# Patient Record
Sex: FEMALE | Race: OTHER | Employment: UNEMPLOYED | URBAN - METROPOLITAN AREA
[De-identification: names, ages, dates, MRNs, and addresses within clinical notes are randomized per-mention and may not be internally consistent; named-entity substitution may affect disease eponyms.]

---

## 2017-01-23 ENCOUNTER — OFFICE VISIT (OUTPATIENT)
Dept: INTERNAL MEDICINE CLINIC | Age: 32
End: 2017-01-23

## 2017-01-23 VITALS
TEMPERATURE: 98.3 F | HEIGHT: 66 IN | OXYGEN SATURATION: 98 % | RESPIRATION RATE: 16 BRPM | DIASTOLIC BLOOD PRESSURE: 80 MMHG | HEART RATE: 82 BPM | WEIGHT: 249 LBS | BODY MASS INDEX: 40.02 KG/M2 | SYSTOLIC BLOOD PRESSURE: 114 MMHG

## 2017-01-23 DIAGNOSIS — K21.9 GASTROESOPHAGEAL REFLUX DISEASE, ESOPHAGITIS PRESENCE NOT SPECIFIED: Primary | ICD-10-CM

## 2017-01-23 NOTE — PROGRESS NOTES
.it   Cory Giordano is a 32 y.o. female and presents with Heartburn (stomach pain   chronic    tried OTC meds nothing works  )  . GERD  Pt reports she is having heart burn symptoms. No weight loss no blood in her stool. She has been gaining weight. She has tried Tums with no relief. She has not tried omeprazole otc. CT abdomen and pelvis 7/2016 overall normal.  She went to [facility] in December due to SI. She tried to take full bottle of pills as Suicide attempt. She had gi work up there and reportedly negative. I do not have these records at this visit. She reports she had an abdominal ultrasound which was normal.  She notes she has a taste in her mouth at times which is like acid    Pseudotumor cerebri  She has not seen neurology yet    Depression  Taking medications and seeing psychiatry        Review of Systems  Constitutional: negative for fevers, chills, anorexia and weight loss  Eyes:   negative for visual disturbance and irritation  ENT:   negative for tinnitus,sore throat,nasal congestion,ear pains. hoarseness  Respiratory:  negative for cough, hemoptysis, dyspnea,wheezing  CV:   negative for chest pain, palpitations, lower extremity edema  GI:   negative for nausea, vomiting, diarrhea, abdominal pain,melena  Endo:               negative for polyuria,polydipsia,polyphagia,heat intolerance  Genitourinary: negative for frequency, dysuria and hematuria  Integument:  negative for rash and pruritus  Hematologic:  negative for easy bruising and gum/nose bleeding  Musculoskel: negative for myalgias, arthralgias, back pain, muscle weakness, joint pain  Neurological:  negative for headaches, dizziness, vertigo, memory problems and gait   Behavl/Psych: negative for feelings of anxiety, depression, mood changes    Past Medical History   Diagnosis Date    Calculus of kidney     H/O headache 7/8/2013    Headache(784.0)     Kidney disease     Pseudotumor cerebri syndrome 7/8/2013     Past Surgical History   Procedure Laterality Date    Emg limited  2/12/2016     Social History     Social History    Marital status:      Spouse name: N/A    Number of children: N/A    Years of education: N/A     Social History Main Topics    Smoking status: Never Smoker    Smokeless tobacco: Never Used    Alcohol use No    Drug use: No    Sexual activity: Yes     Partners: Male     Birth control/ protection: IUD     Other Topics Concern    None     Social History Narrative    Home with children 3 healthy     Family History   Problem Relation Age of Onset    Diabetes Mother     Hypertension Mother     Diabetes Father     Hypertension Father     Cancer Neg Hx      Current Outpatient Prescriptions   Medication Sig Dispense Refill    lurasidone (LATUDA) 80 mg tab tablet Take 80 mg by mouth. Indications: half tablet daily   0.5      traMADol (ULTRAM) 50 mg tablet Take 1 Tab by mouth every six (6) hours as needed for Pain. Max Daily Amount: 200 mg. 30 Tab 0    topiramate (TOPAMAX) 50 mg tablet Take 1 tab twice a day for migraine prevention 60 Tab 2    OTHER Rx: Neutral position wrist splint, left hand. Dx: Carpal Tunnel Syndrome G56.00    Directions: wear overnight. Dispense #1. No RF 1 Units 0    prenatal multivit-ca-min-fe-fa Tab Take  by mouth.          No Known Allergies    Objective:  Visit Vitals    /80 (BP 1 Location: Left arm, BP Patient Position: Sitting)    Pulse 82    Temp 98.3 °F (36.8 °C)    Resp 16    Ht 5' 6\" (1.676 m)    Wt 249 lb (112.9 kg)    SpO2 98%    BMI 40.19 kg/m2     Physical Exam:   General appearance - alert, well appearing, and in no distress, overweight and voices frustration re: home situation  Mental status - alert, oriented to person, place, and time  EYE-BENEDICT, EOMI, corneas normal, no foreign bodies, visual acuity normal both eyes, no periorbital cellulitis  ENT-ENT exam normal, no neck nodes or sinus tenderness  Nose - normal and patent, no erythema, discharge or polyps  Mouth - mucous membranes moist, pharynx normal without lesions  Neck - supple, no significant adenopathy   Chest - clear to auscultation, no wheezes, rales or rhonchi, symmetric air entry   Heart - normal rate, regular rhythm, normal S1, S2, no murmurs, rubs, clicks or gallops   Abdomen - soft, nontender, nondistended, no masses or organomegaly, obese, striae  Lymph- no adenopathy palpable  Ext-peripheral pulses normal, no pedal edema, no clubbing or cyanosis  Skin-Warm and dry. no hyperpigmentation, vitiligo, or suspicious lesions, no blisters or rash in area  Neuro -alert, oriented, normal speech, no focal findings or movement disorder noted  Neck-full range of motion      Results for orders placed or performed during the hospital encounter of 07/25/16   CULTURE, URINE   Result Value Ref Range    Special Requests: NO SPECIAL REQUESTS      Bath Count [id number]  COLONIES/mL        Culture result: MIXED UROGENITAL ANGELICA ISOLATED     CBC WITH AUTOMATED DIFF   Result Value Ref Range    WBC 5.6 3.6 - 11.0 K/uL    RBC 4.68 3.80 - 5.20 M/uL    HGB 13.4 11.5 - 16.0 g/dL    HCT 39.7 35.0 - 47.0 %    MCV 84.8 80.0 - 99.0 FL    MCH 28.6 26.0 - 34.0 PG    MCHC 33.8 30.0 - 36.5 g/dL    RDW 11.8 11.5 - 14.5 %    PLATELET 021 571 - 321 K/uL    NEUTROPHILS 57 32 - 75 %    LYMPHOCYTES 32 12 - 49 %    MONOCYTES 9 5 - 13 %    EOSINOPHILS 2 0 - 7 %    BASOPHILS 0 0 - 1 %    ABS. NEUTROPHILS 3.2 1.8 - 8.0 K/UL    ABS. LYMPHOCYTES 1.8 0.8 - 3.5 K/UL    ABS. MONOCYTES 0.5 0.0 - 1.0 K/UL    ABS. EOSINOPHILS 0.1 0.0 - 0.4 K/UL    ABS.  BASOPHILS 0.0 0.0 - 0.1 K/UL    DF AUTOMATED     METABOLIC PANEL, COMPREHENSIVE   Result Value Ref Range    Sodium 139 136 - 145 mmol/L    Potassium 3.6 3.5 - 5.1 mmol/L    Chloride 103 97 - 108 mmol/L    CO2 26 21 - 32 mmol/L    Anion gap 10 5 - 15 mmol/L    Glucose 101 (H) 65 - 100 mg/dL    BUN 14 6 - 20 MG/DL    Creatinine 0.63 0.55 - 1.02 MG/DL    BUN/Creatinine ratio 22 (H) 12 - 20 GFR est AA >60 >60 ml/min/1.73m2    GFR est non-AA >60 >60 ml/min/1.73m2    Calcium 9.5 8.5 - 10.1 MG/DL    Bilirubin, total 1.8 (H) 0.2 - 1.0 MG/DL    ALT 21 12 - 78 U/L    AST 13 (L) 15 - 37 U/L    Alk. phosphatase 50 45 - 117 U/L    Protein, total 7.8 6.4 - 8.2 g/dL    Albumin 3.7 3.5 - 5.0 g/dL    Globulin 4.1 (H) 2.0 - 4.0 g/dL    A-G Ratio 0.9 (L) 1.1 - 2.2     LIPASE   Result Value Ref Range    Lipase 201 73 - 393 U/L   SED RATE (ESR)   Result Value Ref Range    Sed rate, automated 30 (H) 0 - 20 mm/hr   URINALYSIS W/ REFLEX CULTURE   Result Value Ref Range    Color YELLOW      Appearance HAZY (A) CLEAR      Specific gravity 1.020 1.003 - 1.030      pH (UA) 6.0 5.0 - 8.0      Protein NEGATIVE  NEG mg/dL    Glucose NEGATIVE  NEG mg/dL    Ketone NEGATIVE  NEG mg/dL    Bilirubin NEGATIVE  NEG      Blood TRACE (A) NEG      Urobilinogen 0.2 0.2 - 1.0 EU/dL    Nitrites NEGATIVE  NEG      Leukocyte Esterase TRACE (A) NEG      WBC 5-10 0 - 4 /hpf    RBC 0-5 0 - 5 /hpf    Epithelial cells MANY (A) FEW /lpf    Bacteria 1+ (A) NEG /hpf    UA:UC IF INDICATED URINE CULTURE ORDERED (A) CNI     HCG URINE, QL. - POC   Result Value Ref Range    Pregnancy test,urine (POC) NEGATIVE  NEG       Fadia was seen today for heartburn. Diagnoses and all orders for this visit:    Gastroesophageal reflux disease, esophagitis presence not specified  Benign exam, no red flags: weight loss, pain at night, blood in stool  Will try otc prilosec   Follow up in 2 months ini    Pseudotumor cerebri  Needs follow up with neurology. She is aware   No headache or sx on this visit    Depression  Follow up with psychiatry      I spent 15 min with pt and >50% of the time was spent in management and counseling gerd and diagnositc trial of PPI. disussion with pt re: compliance with specialists. jbk    This note will not be viewable in [address]

## 2017-01-23 NOTE — PROGRESS NOTES
Have you been to the ER or urgent care clinic since your last visit? ER  [place]  Stomach  X 2 months ago         Have you been hospitalized since your last visit? [facility]   Overdose   [place]   12/28/16   X 8 days     Have you been seen or consulted any other health care provider outside of [address] [place] [address] since your last visit (including pap smears, colonoscopy screening)? MRI   Blood work  EKG   All at [facility]

## 2017-01-23 NOTE — PATIENT INSTRUCTIONS

## 2017-01-23 NOTE — MR AVS SNAPSHOT
Visit Information Date & Time Provider Department Dept. Phone Encounter #  
 1/23/2017  9:45 AM Geeta Hair MD [facility] [address] [id number] Upcoming Health Maintenance Date Due DTaP/Tdap/Td series (1 - Tdap) 12/3/2006 PAP AKA CERVICAL CYTOLOGY 7/10/2016 INFLUENZA AGE 9 TO ADULT 8/1/2016 Allergies as of 1/23/2017  Review Complete On: 1/23/2017 By: Geeta Hair MD  
 No Known Allergies Current Immunizations  Reviewed on 1/24/2014 No immunizations on file. Not reviewed this visit You Were Diagnosed With   
  
 Codes Comments Gastroesophageal reflux disease, esophagitis presence not specified    -  Primary ICD-10-CM: K21.9 ICD-9-CM: 530.81 Vitals BP Pulse Temp Resp Height(growth percentile) Weight(growth percentile) 114/80 (BP 1 Location: Left arm, BP Patient Position: Sitting) 82 98.3 °F (36.8 °C) 16 5' 6\" (1.676 m) 249 lb (112.9 kg) SpO2 BMI OB Status Smoking Status 98% 40.19 kg/m2 IUD Never Smoker BMI and BSA Data Body Mass Index Body Surface Area  
 40.19 kg/m 2 2.29 m 2 Preferred Pharmacy Pharmacy Name Phone [facility] [facility] Your Updated Medication List  
  
   
This list is accurate as of: 1/23/17 10:40 AM.  Always use your most recent med list.  
  
  
  
  
 LATUDA 80 mg Tab tablet Generic drug:  lurasidone Take 80 mg by mouth. Indications: half tablet daily   0.5 OTHER Rx: Neutral position wrist splint, left hand. Dx: Carpal Tunnel Syndrome G56.00    Directions: wear overnight. Dispense #1. No RF  
  
 prenatal multivit-ca-min-fe-fa Tab Take  by mouth. topiramate 50 mg tablet Commonly known as:  TOPAMAX Take 1 tab twice a day for migraine prevention  
  
 traMADol 50 mg tablet Commonly known as:  Tree Olvera  
 Take 1 Tab by mouth every six (6) hours as needed for Pain. Max Daily Amount: 200 mg. Patient Instructions Gastroesophageal Reflux Disease (GERD): Care Instructions Your Care Instructions Gastroesophageal reflux disease (GERD) is the backward flow of stomach acid into the esophagus. The esophagus is the tube that leads from your throat to your stomach. A one-way valve prevents the stomach acid from moving up into this tube. When you have GERD, this valve does not close tightly enough. If you have mild GERD symptoms including heartburn, you may be able to control the problem with antacids or over-the-counter medicine. Changing your diet, losing weight, and making other lifestyle changes can also help reduce symptoms. Follow-up care is a key part of your treatment and safety. Be sure to make and go to all appointments, and call your doctor if you are having problems. Its also a good idea to know your test results and keep a list of the medicines you take. How can you care for yourself at home? · Take your medicines exactly as prescribed. Call your doctor if you think you are having a problem with your medicine. · Your doctor may recommend over-the-counter medicine. For mild or occasional indigestion, antacids, such as Tums, Gaviscon, Mylanta, or Maalox, may help. Your doctor also may recommend over-the-counter acid reducers, such as Pepcid AC, Tagamet HB, Zantac 75, or Prilosec. Read and follow all instructions on the label. If you use these medicines often, talk with your doctor. · Change your eating habits. ¨ Its best to eat several small meals instead of two or three large meals. ¨ After you eat, wait 2 to 3 hours before you lie down. ¨ Chocolate, mint, and alcohol can make GERD worse. ¨ Spicy foods, foods that have a lot of acid (like tomatoes and oranges), and coffee can make GERD symptoms worse in some people.  If your symptoms are worse after you eat a certain food, you may want to stop eating that food to see if your symptoms get better. · Do not smoke or chew tobacco. Smoking can make GERD worse. If you need help quitting, talk to your doctor about stop-smoking programs and medicines. These can increase your chances of quitting for good. · If you have GERD symptoms at night, raise the head of your bed 6 to 8 inches by putting the frame on blocks or placing a foam wedge under the head of your mattress. (Adding extra pillows does not work.) · Do not wear tight clothing around your middle. · Lose weight if you need to. Losing just 5 to 10 pounds can help. When should you call for help? Call your doctor now or seek immediate medical care if: 
· You have new or different belly pain. · Your stools are black and tarlike or have streaks of blood. Watch closely for changes in your health, and be sure to contact your doctor if: 
· Your symptoms have not improved after 2 days. · Food seems to catch in your throat or chest. 
Where can you learn more? Go to http://adolfo-joni.info/. Enter G049 in the search box to learn more about \"Gastroesophageal Reflux Disease (GERD): Care Instructions. \" Current as of: August 9, 2016 Content Version: 11.1 © 4106-5256 [employer]. Care instructions adapted under license by [employer] (which disclaims liability or warranty for this information). If you have questions about a medical condition or this instruction, always ask your healthcare professional. [place] [address] any warranty or liability for your use of this information. Introducing [facility]! Dear Richelle Serum: Thank you for requesting a Code71 account. Our records indicate that you already have an active Code71 account. You can access your account anytime at [email or web address]. [email or web address] Did you know that you can access your hospital and ER discharge instructions at any time in TraveDoc? You can also review all of your test results from your hospital stay or ER visit. Additional Information If you have questions, please visit the Frequently Asked Questions section of the TraveDoc website at [email or web address] Remember, TraveDoc is NOT to be used for urgent needs. For medical emergencies, dial 911. Now available from your iPhone and Android! Please provide this summary of care documentation to your next provider. Your primary care clinician is listed as Laura Borges. If you have any questions after today's visit, please call [phone].

## 2017-02-05 ENCOUNTER — HOSPITAL ENCOUNTER (EMERGENCY)
Age: 32
Discharge: HOME | End: 2017-02-06
Attending: EMERGENCY MEDICINE
Payer: SUBSIDIZED

## 2017-02-05 DIAGNOSIS — R51.9 ACUTE NONINTRACTABLE HEADACHE, UNSPECIFIED HEADACHE TYPE: Primary | ICD-10-CM

## 2017-02-05 LAB — HCG UR QL: NEGATIVE

## 2017-02-05 PROCEDURE — 96374 THER/PROPH/DIAG INJ IV PUSH: CPT

## 2017-02-05 PROCEDURE — 99284 EMERGENCY DEPT VISIT MOD MDM: CPT

## 2017-02-05 PROCEDURE — 96375 TX/PRO/DX INJ NEW DRUG ADDON: CPT

## 2017-02-05 PROCEDURE — 74011250636 HC RX REV CODE- 250/636: Performed by: EMERGENCY MEDICINE

## 2017-02-05 PROCEDURE — 74011000258 HC RX REV CODE- 258: Performed by: EMERGENCY MEDICINE

## 2017-02-05 PROCEDURE — 81025 URINE PREGNANCY TEST: CPT

## 2017-02-05 PROCEDURE — 96361 HYDRATE IV INFUSION ADD-ON: CPT

## 2017-02-05 RX ORDER — DIPHENHYDRAMINE HYDROCHLORIDE 50 MG/ML
12.5 INJECTION, SOLUTION INTRAMUSCULAR; INTRAVENOUS ONCE
Status: COMPLETED | OUTPATIENT
Start: 2017-02-05 | End: 2017-02-05

## 2017-02-05 RX ADMIN — PROCHLORPERAZINE EDISYLATE 10 MG: 5 INJECTION INTRAMUSCULAR; INTRAVENOUS at 23:19

## 2017-02-05 RX ADMIN — SODIUM CHLORIDE 1000 ML: 900 INJECTION, SOLUTION INTRAVENOUS at 23:11

## 2017-02-05 RX ADMIN — DIPHENHYDRAMINE HYDROCHLORIDE 12.5 MG: 50 INJECTION, SOLUTION INTRAMUSCULAR; INTRAVENOUS at 23:18

## 2017-02-06 VITALS
HEART RATE: 75 BPM | RESPIRATION RATE: 20 BRPM | OXYGEN SATURATION: 100 % | TEMPERATURE: 97.7 F | WEIGHT: 254.85 LBS | SYSTOLIC BLOOD PRESSURE: 116 MMHG | HEIGHT: 62 IN | DIASTOLIC BLOOD PRESSURE: 60 MMHG | BODY MASS INDEX: 46.9 KG/M2

## 2017-02-06 NOTE — ED NOTES
Discharge note: The patient was discharged home in stable condition, accompanied by . The patient is alert and oriented, is in no respiratory distress and has vital signs within normal limits. The patient's diagnosis, condition and treatment were explained to patient by Dr Frank Hanson and reinforced by nurse. The patient and family party expressed understanding of discharge instructions and plan of care. A discharge plan has been developed. A  was not involved in the process. Patient offered a wheelchair to ED lobby for discharge but declined at this time. Patient ambulatory to ED lobby to go home with family member.

## 2017-02-06 NOTE — DISCHARGE INSTRUCTIONS
Headache: Care Instructions  Your Care Instructions    Headaches have many possible causes. Most headaches aren't a sign of a more serious problem, and they will get better on their own. Home treatment may help you feel better faster. The doctor has checked you carefully, but problems can develop later. If you notice any problems or new symptoms, get medical treatment right away. Follow-up care is a key part of your treatment and safety. Be sure to make and go to all appointments, and call your doctor if you are having problems. It's also a good idea to know your test results and keep a list of the medicines you take. How can you care for yourself at home? · Do not drive if you have taken a prescription pain medicine. · Rest in a quiet, dark room until your headache is gone. Close your eyes and try to relax or go to sleep. Don't watch TV or read. · Put a cold, moist cloth or cold pack on the painful area for 10 to 20 minutes at a time. Put a thin cloth between the cold pack and your skin. · Use a warm, moist towel or a heating pad set on low to relax tight shoulder and neck muscles. · Have someone gently massage your neck and shoulders. · Take pain medicines exactly as directed. ¨ If the doctor gave you a prescription medicine for pain, take it as prescribed. ¨ If you are not taking a prescription pain medicine, ask your doctor if you can take an over-the-counter medicine. · Be careful not to take pain medicine more often than the instructions allow, because you may get worse or more frequent headaches when the medicine wears off. · Do not ignore new symptoms that occur with a headache, such as a fever, weakness or numbness, vision changes, or confusion. These may be signs of a more serious problem. To prevent headaches  · Keep a headache diary so you can figure out what triggers your headaches. Avoiding triggers may help you prevent headaches.  Record when each headache began, how long it lasted, and what the pain was like (throbbing, aching, stabbing, or dull). Write down any other symptoms you had with the headache, such as nausea, flashing lights or dark spots, or sensitivity to bright light or loud noise. Note if the headache occurred near your period. List anything that might have triggered the headache, such as certain foods (chocolate, cheese, wine) or odors, smoke, bright light, stress, or lack of sleep. · Find healthy ways to deal with stress. Headaches are most common during or right after stressful times. Take time to relax before and after you do something that has caused a headache in the past.  · Try to keep your muscles relaxed by keeping good posture. Check your jaw, face, neck, and shoulder muscles for tension, and try relaxing them. When sitting at a desk, change positions often, and stretch for 30 seconds each hour. · Get plenty of sleep and exercise. · Eat regularly and well. Long periods without food can trigger a headache. · Treat yourself to a massage. Some people find that regular massages are very helpful in relieving tension. · Limit caffeine by not drinking too much coffee, tea, or soda. But don't quit caffeine suddenly, because that can also give you headaches. · Reduce eyestrain from computers by blinking frequently and looking away from the computer screen every so often. Make sure you have proper eyewear and that your monitor is set up properly, about an arm's length away. · Seek help if you have depression or anxiety. Your headaches may be linked to these conditions. Treatment can both prevent headaches and help with symptoms of anxiety or depression. When should you call for help? Call 911 anytime you think you may need emergency care. For example, call if:  · You have signs of a stroke. These may include:  ¨ Sudden numbness, paralysis, or weakness in your face, arm, or leg, especially on only one side of your body. ¨ Sudden vision changes.   ¨ Sudden trouble speaking. ¨ Sudden confusion or trouble understanding simple statements. ¨ Sudden problems with walking or balance. ¨ A sudden, severe headache that is different from past headaches. Call your doctor now or seek immediate medical care if:  · You have a new or worse headache. · Your headache gets much worse. Where can you learn more? Go to http://adolfo-joni.info/. Enter M271 in the search box to learn more about \"Headache: Care Instructions. \"  Current as of: February 19, 2016  Content Version: 11.1  © 6607-2710 [employer]. Care instructions adapted under license by [employer] (which disclaims liability or warranty for this information). If you have questions about a medical condition or this instruction, always ask your healthcare professional. [address] any warranty or liability for your use of this information. We hope that we have addressed all of your medical concerns. The examination and treatment you received in the Emergency Department were for an emergent problem and were not intended as complete care. It is important that you follow up with your healthcare provider(s) for ongoing care. If your symptoms worsen or do not improve as expected, and you are unable to reach your usual health care provider(s), you should return to the Emergency Department. Today's healthcare is undergoing tremendous change, and patient satisfaction surveys are one of the many tools to assess the quality of medical care. You may receive a survey from the [employer] regarding your experience in the Emergency Department. I hope that your experience has been completely positive, particularly the medical care that I provided. As such, please participate in the survey; anything less than excellent does not meet my expectations or intentions.         [address] [facility] and [address] [place] [address] participate in nationally recognized quality of care measures. If your blood pressure is greater than 120/80, as reported below, we urge that you seek medical care to address the potential of high blood pressure, commonly known as hypertension. Hypertension can be hereditary or can be caused by certain medical conditions, pain, stress, or \"white coat syndrome. \"       Please make an appointment with your health care provider(s) for follow up of your Emergency Department visit. VITALS:   Patient Vitals for the past 8 hrs:   Temp Pulse Resp BP SpO2   02/05/17 2330 - - 20 118/71 100 %   02/05/17 2251 97.7 °F (36.5 °C) 75 17 140/64 100 %          Thank you for allowing us to provide you with medical care today. We realize that you have many choices for your emergency care needs. Please choose us in the future for any continued health care needs. Sherri Pace, [address] [place] [address] 20.   Office: [phone]            Recent Results (from the past 24 hour(s))   HCG URINE, QL. - POC    Collection Time: 02/05/17 11:05 PM   Result Value Ref Range    Pregnancy test,urine (POC) NEGATIVE  NEG         No results found.

## 2017-02-06 NOTE — ED PROVIDER NOTES
HPI Comments: Skye Katz is a 31 yo F with headache. She states that this headache started this morning at [employer] and has increased throughout the day. It started on the left side of her head and now it has spread all over. She has taken Tylenol, Excedrin, Nyquil and Dayquil but it has not helped. She has also had vomiting. She denies fever and diarrhea. She states that she frequently gets headaches. Past Medical History:   Diagnosis Date    Calculus of kidney     H/O headache 7/8/2013    Headache(784.0)     Kidney disease     Pseudotumor cerebri syndrome 7/8/2013       Past Surgical History:   Procedure Laterality Date    Emg limited  2/12/2016         Family History:   Problem Relation Age of Onset    Diabetes Mother     Hypertension Mother     Diabetes Father     Hypertension Father     Cancer Neg Hx        Social History     Social History    Marital status:      Spouse name: N/A    Number of children: N/A    Years of education: N/A     Occupational History    Not on file. Social History Main Topics    Smoking status: Never Smoker    Smokeless tobacco: Never Used    Alcohol use No    Drug use: No    Sexual activity: Yes     Partners: Male     Birth control/ protection: IUD     Other Topics Concern    Not on file     Social History Narrative    Home with children 3 healthy         ALLERGIES: Review of patient's allergies indicates no known allergies. Review of Systems   Constitutional: Negative for fever. HENT: Negative for sore throat. Eyes: Negative for visual disturbance. Respiratory: Negative for cough. Cardiovascular: Negative for chest pain. Gastrointestinal: Positive for nausea and vomiting. Negative for abdominal pain. Genitourinary: Negative for dysuria. Musculoskeletal: Negative for back pain. Skin: Negative for rash. Neurological: Positive for headaches.        Vitals:    02/05/17 2251 02/05/17 2330 02/06/17 0001   BP: 140/64 118/71 116/60   Pulse: 75     Resp: 17 20 20   Temp: 97.7 °F (36.5 °C)     SpO2: 100% 100% 100%   Weight: 115.6 kg (254 lb 13.6 oz)     Height: 5' 2\" (1.575 m)              Physical Exam   Constitutional: She appears well-developed and well-nourished. No distress. HENT:   Head: Normocephalic and atraumatic. Mouth/Throat: Oropharynx is clear and moist.   Eyes: Conjunctivae and EOM are normal.   Neck: Normal range of motion and phonation normal.   Cardiovascular: Normal rate and intact distal pulses. Pulmonary/Chest: Effort normal. No respiratory distress. Abdominal: Soft. Bowel sounds are normal. She exhibits no distension. There is no tenderness. Musculoskeletal: Normal range of motion. She exhibits no tenderness. Neurological: She is alert. She is not disoriented. She exhibits normal muscle tone. Skin: Skin is warm and dry. Nursing note and vitals reviewed. [facility]  ED Course     12:07 AM  PAtient reassessed and headache is improved after compazine and benadryl. IVNS infusing. Will discharge home with NS bolus completed.      Procedures

## 2018-01-26 ENCOUNTER — IP HISTORICAL/CONVERTED ENCOUNTER (OUTPATIENT)
Dept: OTHER | Age: 33
End: 2018-01-26

## 2021-01-05 ENCOUNTER — APPOINTMENT (OUTPATIENT)
Dept: GENERAL RADIOLOGY | Age: 36
DRG: 918 | End: 2021-01-05
Attending: EMERGENCY MEDICINE
Payer: MEDICARE

## 2021-01-05 ENCOUNTER — HOSPITAL ENCOUNTER (INPATIENT)
Age: 36
LOS: 2 days | Discharge: ANOTHER HOSPITAL | DRG: 918 | End: 2021-01-07
Attending: EMERGENCY MEDICINE | Admitting: INTERNAL MEDICINE
Payer: MEDICARE

## 2021-01-05 DIAGNOSIS — T39.1X2A SUICIDE ATTEMPT BY ACETAMINOPHEN OVERDOSE, INITIAL ENCOUNTER (HCC): Primary | ICD-10-CM

## 2021-01-05 PROBLEM — F32.A DEPRESSION: Status: ACTIVE | Noted: 2021-01-05

## 2021-01-05 PROBLEM — T50.901A OVERDOSE: Status: ACTIVE | Noted: 2021-01-05

## 2021-01-05 LAB
ALBUMIN SERPL-MCNC: 3.9 G/DL (ref 3.5–5)
ALBUMIN/GLOB SERPL: 0.9 {RATIO} (ref 1.1–2.2)
ALP SERPL-CCNC: 63 U/L (ref 45–117)
ALT SERPL-CCNC: 18 U/L (ref 12–78)
AMPHET UR QL SCN: NEGATIVE
ANION GAP SERPL CALC-SCNC: 8 MMOL/L (ref 5–15)
APAP SERPL-MCNC: 152 UG/ML (ref 10–30)
APAP SERPL-MCNC: 26 UG/ML (ref 10–30)
APAP SERPL-MCNC: 77 UG/ML (ref 10–30)
APPEARANCE UR: ABNORMAL
ARTERIAL PATENCY WRIST A: YES
AST SERPL-CCNC: 7 U/L (ref 15–37)
ATRIAL RATE: 101 BPM
ATRIAL RATE: 101 BPM
ATRIAL RATE: 151 BPM
BACTERIA URNS QL MICRO: NEGATIVE /HPF
BARBITURATES UR QL SCN: NEGATIVE
BASE DEFICIT BLDA-SCNC: 3.9 MMOL/L
BASOPHILS # BLD: 0 K/UL (ref 0–0.1)
BASOPHILS NFR BLD: 1 % (ref 0–1)
BDY SITE: ABNORMAL
BENZODIAZ UR QL: NEGATIVE
BILIRUB SERPL-MCNC: 0.7 MG/DL (ref 0.2–1)
BILIRUB UR QL: NEGATIVE
BUN SERPL-MCNC: 13 MG/DL (ref 6–20)
BUN/CREAT SERPL: 17 (ref 12–20)
CALCIUM SERPL-MCNC: 9.2 MG/DL (ref 8.5–10.1)
CALCULATED P AXIS, ECG09: 44 DEGREES
CALCULATED P AXIS, ECG09: 50 DEGREES
CALCULATED R AXIS, ECG10: 14 DEGREES
CALCULATED R AXIS, ECG10: 21 DEGREES
CALCULATED R AXIS, ECG10: 9 DEGREES
CALCULATED T AXIS, ECG11: 11 DEGREES
CALCULATED T AXIS, ECG11: 23 DEGREES
CALCULATED T AXIS, ECG11: 37 DEGREES
CANNABINOIDS UR QL SCN: NEGATIVE
CHLORIDE SERPL-SCNC: 111 MMOL/L (ref 97–108)
CO2 SERPL-SCNC: 20 MMOL/L (ref 21–32)
COCAINE UR QL SCN: NEGATIVE
COLOR UR: ABNORMAL
COMMENT, HOLDF: NORMAL
CREAT SERPL-MCNC: 0.78 MG/DL (ref 0.55–1.02)
DATE LAST DOSE: ABNORMAL
DATE LAST DOSE: ABNORMAL
DIAGNOSIS, 93000: NORMAL
DIFFERENTIAL METHOD BLD: ABNORMAL
DRUG SCRN COMMENT,DRGCM: NORMAL
EOSINOPHIL # BLD: 0.1 K/UL (ref 0–0.4)
EOSINOPHIL NFR BLD: 2 % (ref 0–7)
EPITH CASTS URNS QL MICRO: ABNORMAL /LPF
ERYTHROCYTE [DISTWIDTH] IN BLOOD BY AUTOMATED COUNT: 12.9 % (ref 11.5–14.5)
ETHANOL SERPL-MCNC: <10 MG/DL
GLOBULIN SER CALC-MCNC: 4.2 G/DL (ref 2–4)
GLUCOSE BLD STRIP.AUTO-MCNC: 105 MG/DL (ref 65–100)
GLUCOSE SERPL-MCNC: 136 MG/DL (ref 65–100)
GLUCOSE UR STRIP.AUTO-MCNC: NEGATIVE MG/DL
HCG UR QL: NEGATIVE
HCO3 BLDA-SCNC: 19 MMOL/L (ref 22–26)
HCT VFR BLD AUTO: 40.8 % (ref 35–47)
HGB BLD-MCNC: 13.8 G/DL (ref 11.5–16)
HGB UR QL STRIP: NEGATIVE
IMM GRANULOCYTES # BLD AUTO: 0.1 K/UL (ref 0–0.04)
IMM GRANULOCYTES NFR BLD AUTO: 1 % (ref 0–0.5)
KETONES UR QL STRIP.AUTO: NEGATIVE MG/DL
LEUKOCYTE ESTERASE UR QL STRIP.AUTO: ABNORMAL
LITHIUM SERPL-SCNC: <0.2 MMOL/L (ref 0.6–1.2)
LITHIUM SERPL-SCNC: <0.2 MMOL/L (ref 0.6–1.2)
LYMPHOCYTES # BLD: 2.8 K/UL (ref 0.8–3.5)
LYMPHOCYTES NFR BLD: 42 % (ref 12–49)
MCH RBC QN AUTO: 28.3 PG (ref 26–34)
MCHC RBC AUTO-ENTMCNC: 33.8 G/DL (ref 30–36.5)
MCV RBC AUTO: 83.8 FL (ref 80–99)
METHADONE UR QL: NEGATIVE
MONOCYTES # BLD: 0.4 K/UL (ref 0–1)
MONOCYTES NFR BLD: 6 % (ref 5–13)
NEUTS SEG # BLD: 3.2 K/UL (ref 1.8–8)
NEUTS SEG NFR BLD: 48 % (ref 32–75)
NITRITE UR QL STRIP.AUTO: NEGATIVE
NRBC # BLD: 0 K/UL (ref 0–0.01)
NRBC BLD-RTO: 0 PER 100 WBC
OPIATES UR QL: NEGATIVE
P-R INTERVAL, ECG05: 156 MS
P-R INTERVAL, ECG05: 162 MS
P-R INTERVAL, ECG05: 96 MS
PCO2 BLDA: 29 MMHG (ref 35–45)
PCP UR QL: NEGATIVE
PH BLDA: 7.44 [PH] (ref 7.35–7.45)
PH UR STRIP: 5 [PH] (ref 5–8)
PLATELET # BLD AUTO: 338 K/UL (ref 150–400)
PMV BLD AUTO: 9.7 FL (ref 8.9–12.9)
PO2 BLDA: 88 MMHG (ref 80–100)
POTASSIUM SERPL-SCNC: 3 MMOL/L (ref 3.5–5.1)
PROT SERPL-MCNC: 8.1 G/DL (ref 6.4–8.2)
PROT UR STRIP-MCNC: NEGATIVE MG/DL
Q-T INTERVAL, ECG07: 340 MS
Q-T INTERVAL, ECG07: 346 MS
Q-T INTERVAL, ECG07: 350 MS
QRS DURATION, ECG06: 76 MS
QRS DURATION, ECG06: 86 MS
QRS DURATION, ECG06: 86 MS
QTC CALCULATION (BEZET), ECG08: 448 MS
QTC CALCULATION (BEZET), ECG08: 453 MS
QTC CALCULATION (BEZET), ECG08: 538 MS
RBC # BLD AUTO: 4.87 M/UL (ref 3.8–5.2)
RBC #/AREA URNS HPF: ABNORMAL /HPF (ref 0–5)
REPORTED DOSE,DOSE: ABNORMAL UNITS
REPORTED DOSE,DOSE: ABNORMAL UNITS
REPORTED DOSE/TIME,TMG: ABNORMAL
REPORTED DOSE/TIME,TMG: ABNORMAL
SALICYLATES SERPL-MCNC: <1.7 MG/DL (ref 2.8–20)
SAMPLES BEING HELD,HOLD: NORMAL
SAO2 % BLD: 97 % (ref 92–97)
SAO2% DEVICE SAO2% SENSOR NAME: ABNORMAL
SERVICE CMNT-IMP: ABNORMAL
SODIUM SERPL-SCNC: 139 MMOL/L (ref 136–145)
SP GR UR REFRACTOMETRY: 1.02 (ref 1–1.03)
SPECIMEN SITE: ABNORMAL
TROPONIN I SERPL-MCNC: <0.05 NG/ML
UR CULT HOLD, URHOLD: NORMAL
UROBILINOGEN UR QL STRIP.AUTO: 0.2 EU/DL (ref 0.2–1)
VENTRICULAR RATE, ECG03: 101 BPM
VENTRICULAR RATE, ECG03: 101 BPM
VENTRICULAR RATE, ECG03: 151 BPM
WBC # BLD AUTO: 6.6 K/UL (ref 3.6–11)
WBC URNS QL MICRO: ABNORMAL /HPF (ref 0–4)

## 2021-01-05 PROCEDURE — 80307 DRUG TEST PRSMV CHEM ANLYZR: CPT

## 2021-01-05 PROCEDURE — 71045 X-RAY EXAM CHEST 1 VIEW: CPT

## 2021-01-05 PROCEDURE — 84484 ASSAY OF TROPONIN QUANT: CPT

## 2021-01-05 PROCEDURE — 80178 ASSAY OF LITHIUM: CPT

## 2021-01-05 PROCEDURE — 65660000000 HC RM CCU STEPDOWN

## 2021-01-05 PROCEDURE — 77030038269 HC DRN EXT URIN PURWCK BARD -A

## 2021-01-05 PROCEDURE — 36415 COLL VENOUS BLD VENIPUNCTURE: CPT

## 2021-01-05 PROCEDURE — 74011000258 HC RX REV CODE- 258: Performed by: EMERGENCY MEDICINE

## 2021-01-05 PROCEDURE — 81025 URINE PREGNANCY TEST: CPT

## 2021-01-05 PROCEDURE — 85025 COMPLETE CBC W/AUTO DIFF WBC: CPT

## 2021-01-05 PROCEDURE — 80179 DRUG ASSAY SALICYLATE: CPT

## 2021-01-05 PROCEDURE — 80143 DRUG ASSAY ACETAMINOPHEN: CPT

## 2021-01-05 PROCEDURE — 82962 GLUCOSE BLOOD TEST: CPT

## 2021-01-05 PROCEDURE — 77030019905 HC CATH URETH INTMIT MDII -A

## 2021-01-05 PROCEDURE — 93005 ELECTROCARDIOGRAM TRACING: CPT

## 2021-01-05 PROCEDURE — 96361 HYDRATE IV INFUSION ADD-ON: CPT

## 2021-01-05 PROCEDURE — 99285 EMERGENCY DEPT VISIT HI MDM: CPT

## 2021-01-05 PROCEDURE — 74011250636 HC RX REV CODE- 250/636: Performed by: EMERGENCY MEDICINE

## 2021-01-05 PROCEDURE — 74011250636 HC RX REV CODE- 250/636: Performed by: INTERNAL MEDICINE

## 2021-01-05 PROCEDURE — 96376 TX/PRO/DX INJ SAME DRUG ADON: CPT

## 2021-01-05 PROCEDURE — 82077 ASSAY SPEC XCP UR&BREATH IA: CPT

## 2021-01-05 PROCEDURE — 80053 COMPREHEN METABOLIC PANEL: CPT

## 2021-01-05 PROCEDURE — 81001 URINALYSIS AUTO W/SCOPE: CPT

## 2021-01-05 PROCEDURE — 82803 BLOOD GASES ANY COMBINATION: CPT

## 2021-01-05 PROCEDURE — 36600 WITHDRAWAL OF ARTERIAL BLOOD: CPT

## 2021-01-05 PROCEDURE — 96365 THER/PROPH/DIAG IV INF INIT: CPT

## 2021-01-05 RX ORDER — SODIUM CHLORIDE 0.9 % (FLUSH) 0.9 %
5-40 SYRINGE (ML) INJECTION EVERY 8 HOURS
Status: DISCONTINUED | OUTPATIENT
Start: 2021-01-05 | End: 2021-01-07 | Stop reason: HOSPADM

## 2021-01-05 RX ORDER — TOPIRAMATE 100 MG/1
100 TABLET, FILM COATED ORAL
Status: ON HOLD | COMMUNITY
End: 2021-01-11 | Stop reason: SDUPTHER

## 2021-01-05 RX ORDER — SODIUM CHLORIDE 9 MG/ML
150 INJECTION, SOLUTION INTRAVENOUS CONTINUOUS
Status: DISCONTINUED | OUTPATIENT
Start: 2021-01-05 | End: 2021-01-07 | Stop reason: HOSPADM

## 2021-01-05 RX ORDER — ENOXAPARIN SODIUM 100 MG/ML
40 INJECTION SUBCUTANEOUS EVERY 12 HOURS
Status: DISCONTINUED | OUTPATIENT
Start: 2021-01-05 | End: 2021-01-07 | Stop reason: HOSPADM

## 2021-01-05 RX ORDER — LITHIUM CARBONATE 600 MG/1
600 CAPSULE ORAL DAILY
Status: ON HOLD | COMMUNITY
End: 2021-01-08

## 2021-01-05 RX ORDER — TOPIRAMATE 100 MG/1
200 TABLET, FILM COATED ORAL DAILY
COMMUNITY
End: 2021-01-11

## 2021-01-05 RX ORDER — SODIUM CHLORIDE 0.9 % (FLUSH) 0.9 %
5-40 SYRINGE (ML) INJECTION AS NEEDED
Status: DISCONTINUED | OUTPATIENT
Start: 2021-01-05 | End: 2021-01-07 | Stop reason: HOSPADM

## 2021-01-05 RX ORDER — LITHIUM CARBONATE 450 MG/1
450 TABLET ORAL DAILY
COMMUNITY
End: 2021-01-05

## 2021-01-05 RX ORDER — ENOXAPARIN SODIUM 100 MG/ML
40 INJECTION SUBCUTANEOUS DAILY
Status: DISCONTINUED | OUTPATIENT
Start: 2021-01-06 | End: 2021-01-05 | Stop reason: DRUGHIGH

## 2021-01-05 RX ORDER — LITHIUM CARBONATE 450 MG/1
450 TABLET ORAL DAILY
Status: ON HOLD | COMMUNITY
End: 2021-01-08

## 2021-01-05 RX ORDER — POLYETHYLENE GLYCOL 3350 17 G/17G
17 POWDER, FOR SOLUTION ORAL DAILY PRN
Status: DISCONTINUED | OUTPATIENT
Start: 2021-01-05 | End: 2021-01-07 | Stop reason: HOSPADM

## 2021-01-05 RX ORDER — BENZTROPINE MESYLATE 1 MG/1
1 TABLET ORAL
Status: ON HOLD | COMMUNITY
End: 2021-01-11 | Stop reason: SDUPTHER

## 2021-01-05 RX ADMIN — ENOXAPARIN SODIUM 40 MG: 40 INJECTION SUBCUTANEOUS at 21:30

## 2021-01-05 RX ADMIN — SODIUM CHLORIDE 1000 ML: 9 INJECTION, SOLUTION INTRAVENOUS at 11:10

## 2021-01-05 RX ADMIN — ACETYLCYSTEINE 5000 MG: 200 INJECTION, SOLUTION INTRAVENOUS at 14:57

## 2021-01-05 RX ADMIN — SODIUM CHLORIDE 150 ML/HR: 9 INJECTION, SOLUTION INTRAVENOUS at 18:52

## 2021-01-05 RX ADMIN — ACETYLCYSTEINE 10000 MG: 200 INJECTION, SOLUTION INTRAVENOUS at 20:33

## 2021-01-05 RX ADMIN — ACETYLCYSTEINE 15000 MG: 200 INJECTION, SOLUTION INTRAVENOUS at 13:42

## 2021-01-05 RX ADMIN — Medication 10 ML: at 18:52

## 2021-01-05 NOTE — PROGRESS NOTES
[facility]: MED RECONCILIATION    Comments/Recommendations:   Medication reconciliation completed using medication bottles at bedside. Patient states she is due for Jennifer Riverau tomorrow 1/6/21, medication is at bedside. Patient did not take any of her scheduled medications today prior to hospital arrival.   Updated preferred pharmacy to [facility]. Medications added:     none    Medications removed:    none    Medications adjusted:    none    Information obtained from: medication bottles    Allergies: Patient has no known allergies. Prior to Admission Medications:     Medication Documentation Review Audit       Reviewed by Shirley Rosen (Pharmacist) on 01/05/21 at 1549      Medication Sig Documenting Provider Last Dose Status Taking?   benztropine (COGENTIN) 1 mg tablet Take 1 mg by mouth two (2) times daily as needed (soreness in muscles, stiffness). Neel Tavarez MD  Active Yes   lithium carbonate 600 mg capsule Take 600 mg by mouth daily. Neel Tavarez MD  Active Yes           Med Note (SHIRLEY DURANT Jan 5, 2021  2:56 PM) Take 600mg capsule + 450mg ER tablet every morning. lithium carbonate CR (ESKALITH CR) 450 mg CR tablet Take 450 mg by mouth daily. Provider, Historical  Active Yes           Med Note (SHIRLEY DURANT Jan 5, 2021  2:57 PM) Take 600mg capsule + 450mg ER tablet every morning. paliperidone palmitate (Invega Sustenna) 78 mg/0.5 mL injection 78 mg by IntraMUSCular route every thirty (30) days. Neel Tavarez MD 12/6/2020 Active Yes           Med Note (SHIRLEY DURANT Jan 5, 2021  2:59 PM) Due for dose tomorrow 1/6/21, medication is at bedside. topiramate (TOPAMAX) 100 mg tablet Take 200 mg by mouth daily. Neel Tavarez MD  Active Yes   topiramate (TOPAMAX) 100 mg tablet Take 100 mg by mouth nightly.  Neel Tavarez MD  Active Yes                    Thank you,   Shirley Durant, PharmD, BCPS   Contact: [phone]

## 2021-01-05 NOTE — ED NOTES
Patient becoming more alert and answering some questions. Patient states she tried to go to [facility] this morning but \"they did not have the person in front that takes money to park car, so I left\". Patient would not answer RN when asked why she was trying to go to [facility]. Patient also adamantly requesting that her  not be notified or provided any information. Denies any abuse or neglect at home.

## 2021-01-05 NOTE — ED NOTES
This RN updated patient on current plan of care. Patient became very tearful and stated \"I do not want this medication, I don't want to be here. \" patient stating that \"I was trying to do this\"

## 2021-01-05 NOTE — ED PROVIDER NOTES
The history is provided by the EMS personnel. The history is limited by the condition of the patient. Suicidal  The current episode started less than 1 hour ago. The problem has not changed since onset. Primary symptoms include slurred speech, mental status change, unresponsiveness and disorientation. Past Medical History:   Diagnosis Date    Calculus of kidney     H/O headache 7/8/2013    Headache(784.0)     Kidney disease     Pseudotumor cerebri syndrome 7/8/2013       Past Surgical History:   Procedure Laterality Date    EMG LIMITED  2/12/2016         Family History:   Problem Relation Age of Onset    Diabetes Mother     Hypertension Mother     Diabetes Father     Hypertension Father     Cancer Neg Hx        Social History     Socioeconomic History    Marital status:      Spouse name: Not on file    Number of children: Not on file    Years of education: Not on file    Highest education level: Not on file   Occupational History    Not on file   Social Needs    Financial resource strain: Not on file    Food insecurity     Worry: Not on file     Inability: Not on file    Transportation needs     Medical: Not on file     Non-medical: Not on file   Tobacco Use    Smoking status: Never Smoker    Smokeless tobacco: Never Used   Substance and Sexual Activity    Alcohol use: No    Drug use: No    Sexual activity: Yes     Partners: Male     Birth control/protection: I.U.D.    Lifestyle    Physical activity     Days per week: Not on file     Minutes per session: Not on file    Stress: Not on file   Relationships    Social connections     Talks on phone: Not on file     Gets together: Not on file     Attends Yazidi service: Not on file     Active member of club or organization: Not on file     Attends meetings of clubs or organizations: Not on file     Relationship status: Not on file    Intimate partner violence     Fear of current or ex partner: Not on file     Emotionally abused: Not on file     Physically abused: Not on file     Forced sexual activity: Not on file   Other Topics Concern    Not on file   Social History Narrative    Home with children 3 healthy         ALLERGIES: Patient has no known allergies. Review of Systems    Vitals:    01/05/21 1032   BP: (!) 157/88   Pulse: (!) 149   Resp: (!) 35   SpO2: 100%   Weight: 117.9 kg (260 lb)   Height: 5' 2\" (1.575 m)            Physical Exam  Vitals signs and nursing note reviewed. Constitutional:       General: She is in acute distress. Appearance: She is well-developed. She is morbidly obese. She is ill-appearing and toxic-appearing. She is not diaphoretic. Interventions: Nasal cannula in place. HENT:      Head: Normocephalic and atraumatic. Eyes:      Pupils: Pupils are equal, round, and reactive to light. Neck:      Musculoskeletal: Normal range of motion and neck supple. Cardiovascular:      Rate and Rhythm: Regular rhythm. Tachycardia present. Heart sounds: Normal heart sounds. No murmur. No friction rub. No gallop. Pulmonary:      Effort: Pulmonary effort is normal. No respiratory distress. Breath sounds: Normal breath sounds. No wheezing. Abdominal:      General: Bowel sounds are normal. There is no distension. Palpations: Abdomen is soft. Tenderness: There is no abdominal tenderness. There is no guarding or rebound. Musculoskeletal: Normal range of motion. Skin:     General: Skin is warm and dry. Findings: No rash. Neurological:      Mental Status: She is oriented to person, place, and time. She is lethargic. Psychiatric:         Behavior: Behavior normal.         Thought Content: Thought content normal.         Judgment: Judgment normal.          MDM  Number of Diagnoses or Management Options  Suicide attempt by acetaminophen overdose, initial encounter [facility])  Diagnosis management comments:  Total critical care time spent exclusive of procedures:  90min This is a 27-year-old female presenting with EMS for complaints of altered mental status, suicidality, likely overdose, unclear which substances. Per report from EMS, police and EMS were summoned to the scene as the patient was making suicidal statements, report given by her children, patient was found to be locked in the bathroom, upon access to the patient she was unresponsive, tachycardic with preserved blood pressures, protecting her airway. Upon arrival she is awake, intermittently alert, intermittently responsive via following commands, with normal pupils, mildly sluggish, clear breath sounds, soft abdomen, rapid regular heart rate, flushed skin. Medications brought with the patient include benztropine, lithium, topiramate, paliperidone. Presentation favors anticholinergic response given presence of benztropine. Plan to provide IV fluids, obtain CMP, CBC, EKG, chest x-ray, UA, UDS, UPT, point-of-care glucose. Will consult with toxicology, reassess, and make a disposition however the patient will require admission for further care and evaluation. Procedures    Benztropine 1mg filled 11/24/2020 quant 60 (14) left  Lithium 600mg filled 10/10/2020   Lithium 450mg filled 11/24/2020  Topirimate 100mg filled 12/15/2020 quant 90 (11) left  Paliperidone unopened    10:55 AM  Patient discussed with Dr. Kalyan Gomez who recommended supportive care, they will call back to review chemistry and lithium levels. 11:06 AM  Patient attempting to speak, appears to state \"I took a lot of Tylenol\". Estimate time of ingestion 9-10am, will get 2pm APAP level in addition to stat level pending. Perfect Serve Consult for Admission  1:35 PM    ED Room Number: ER02/02  Patient Name and age:  Anjali Class 28 y.o.  female  Working Diagnosis:   1.  Suicide attempt by acetaminophen overdose, initial encounter ([facility] [address]        COVID-19 Suspicion:  no  Sepsis present:  no  Reassessment needed: no  Code Status:  Full Code  Readmission: no  Isolation Requirements:  no  Recommended Level of Care:  ICU  Department:[facility] ED - [phone]  Other:  D/w Dr. Viet Thompson

## 2021-01-05 NOTE — ED NOTES
Patient reporting that she took significant quantities of tylenol and benztropine earlier today. Suhail Evans MD notified. Poison Control has been contacted and updated. PC will call back for lab results.

## 2021-01-05 NOTE — ED NOTES
Patient stating that she does not want to receive the acetylcysteine. Patient states \" I do not want this; it will not change my mind\". Dr Dianna Ascencio made aware of patient statements.

## 2021-01-05 NOTE — ED NOTES
Spoke with poison control and updated with Q4H acetaminophen level of 77. Advised to continue course of treatment and obtain 6H acetaminophen level.

## 2021-01-05 NOTE — PROGRESS NOTES
Enoxaparin 40 mg SQ Q24H adjusted to 40 mg Q12H for BMI > 40 per protocol    Thank you,  Tony BOUCHER [facility]

## 2021-01-05 NOTE — ED NOTES
Verbal shift change report given to Shyam Saeed (oncoming nurse) by Racheal Shepard (offgoing nurse). Report included the following information SBAR, ED Summary and MAR.

## 2021-01-05 NOTE — ED NOTES
Verbal shift change report given to [address] [place] [address] (oncoming nurse) by Su Dallas (offgoing nurse). Report included the following information SBAR, ED Summary and MAR.

## 2021-01-05 NOTE — PROGRESS NOTES
SBAR report received from Rachelkillian santiago, [facility].

## 2021-01-05 NOTE — H&P
[address]  [phone]    Admission History and Physical      NAME:  Blade Rodriguez   :   1985   MRN:  [id number]     PCP:  Randal Rodriguez MD     Date/Time:  2021         Subjective:     CHIEF COMPLAINT: overdose     HISTORY OF PRESENT ILLNESS:     Ms. Chikis Crandall is a 28 y.o. With h/o depression who presents with overdose. Pt is not forthcoming on history but does state she took a large amount of tylenol. She will not state if she took anything else. EMS was called by her children as she was threatening to hurt herself. Past Medical History:   Diagnosis Date    Calculus of kidney     Depression 2021    H/O headache 2013    Headache(784.0)     Kidney disease     Pseudotumor cerebri syndrome 2013        Past Surgical History:   Procedure Laterality Date    EMG LIMITED  2016       Social History     Tobacco Use    Smoking status: Never Smoker    Smokeless tobacco: Never Used   Substance Use Topics    Alcohol use: No        Family History   Problem Relation Age of Onset    Diabetes Mother     Hypertension Mother     Diabetes Father     Hypertension Father     Cancer Neg Hx         No Known Allergies     Prior to Admission medications    Medication Sig Start Date End Date Taking? Authorizing Provider   paliperidone palmitate (Invega Sustenna) 78 mg/0.5 mL injection 78 mg by IntraMUSCular route every thirty (30) days. Yes Other, MD Neel   lithium carbonate 600 mg capsule Take 600 mg by mouth daily. Yes Daysi, MD Neel   topiramate (TOPAMAX) 100 mg tablet Take 200 mg by mouth daily. Yes Daysi, MD Neel   topiramate (TOPAMAX) 100 mg tablet Take 100 mg by mouth nightly. Yes Daysi, MD Neel   benztropine (COGENTIN) 1 mg tablet Take 1 mg by mouth two (2) times daily as needed (soreness in muscles, stiffness).    Yes Daysi, MD Neel   lithium carbonate CR (ESKALITH CR) 450 mg CR tablet Take 450 mg by mouth daily. Yes Provider, Historical         Review of Systems:  (bold if positive, if negative)    Gen:  Eyes:  ENT:  CVS:  Pulm:  GI:    :    MS:  Skin:  Psych:  Endo:    Hem:  Renal:    Neuro:            Objective:      VITALS:    Vital signs reviewed; most recent are:    Visit Vitals  BP (!) 145/96   Pulse 94   Temp 99.1 °F (37.3 °C)   Resp 26   Ht 5' 2\" (1.575 m)   Wt 117.9 kg (260 lb)   SpO2 100%   BMI 47.55 kg/m²     SpO2 Readings from Last 6 Encounters:   01/05/21 100%   02/06/17 100%   01/23/17 98%   07/25/16 100%   07/22/16 98%   03/14/16 99%            Intake/Output Summary (Last 24 hours) at 1/5/2021 1744  Last data filed at 1/5/2021 1442  Gross per 24 hour   Intake 1275 ml   Output    Net 1275 ml            Exam:     Physical Exam:    Gen:  Well-developed, well-nourished, in no acute distress  HEENT:  Pink conjunctivae, PERRL, hearing intact to voice, moist mucous membranes  Neck:  Supple, without masses, thyroid non-tender  Resp:  No accessory muscle use, clear breath sounds without wheezes rales or rhonchi  Card:  No murmurs, normal S1, S2 without thrills, bruits or peripheral edema  Abd:  Soft, non-tender, non-distended, normoactive bowel sounds are present, no palpable organomegaly  Lymph:  No cervical adenopathy  Musc:  No cyanosis or clubbing  Skin:  No rashes or ulcers, skin turgor is good  Neuro:  Cranial nerves 3-12 are grossly intact,  strength is 5/5 bilaterally, dorsi / plantarflexion strength is 5/5 bilaterally, follows commands appropriately  Psych:  Alert with good insight.   Oriented to person, place, and time       Labs:    Recent Labs     01/05/21  1036   WBC 6.6   HGB 13.8   HCT 40.8        Recent Labs     01/05/21  1036      K 3.0*   *   CO2 20*   *   BUN 13   CREA 0.78   CA 9.2   ALB 3.9   ALT 18     No components found for: GLPOC  Recent Labs     01/05/21  1032   PH 7.44   PCO2 29*   PO2 88   HCO3 19*     No results for input(s): INR, INREXT in the last 72 hours. EKG reviewed:   Sinus tachycardia. Qtc 453       Assessment/Plan:       Principal Problem:    Overdose: pt is not forthcoming in substances ingested, she did take tylenol. Based on pill counts she may have also taken cogentin and lithium. Start IVF. Will send repeat lithium level and monitor on telemetry. Repeat EKG in am    Tylenol overdose: she does meet criteria for NAC and this has been started. Fortunately she was seen within 8 hours of ingestion. Trend tylenol, INR, CMP     Active Problems:    Depression / Suicide attempt: hold all home meds including cogentin, lithium, ivega ustenna and topamax. Sitter in place.  Consult psychiatry         Total time spent with patient:60 minutes                 Care Plan discussed with: Patient    Discussed:  Care Plan    Prophylaxis:  Lovenox    Probable Disposition:  Inpatient psych           ___________________________________________________    Attending Physician: Sukh Gutierrez MD

## 2021-01-05 NOTE — ED NOTES
Patient reports that she has been treated in hospital previously for behavioral health problems, and states that her family makes fun of her for it and that stresses her out. Patient also requesting that staff does not notify her  or other relatives in regards to this visit or give them any information.

## 2021-01-05 NOTE — ED TRIAGE NOTES
Patient presents to ED via EMS for overdose of unknown substance. Per pt's children, she was threatening to hurt herself this AM from her bedroom. On EMS arrival, pt was minimally responsive.  for EMS. Tachycardiac en route,     Following some commands on arrival to ED.

## 2021-01-05 NOTE — PROGRESS NOTES
36 Per other RN, patient's  is at the waiting room and wants to see the patient. Due to patient's statement earlier that she does not want anybody to know about her admission, she was asked by this RN that her  is here and wants to see her. The patient answered. \"It is okay. \" This RN repeated the question and she confirmed, \"It's okay. \"     [id number]   Patient's  was called in the waiting area. Per , patient was taking a \"shot\" for almost a year and a half from [facility] and that helped the patient until the patient asked to wean off the \"shot\" since it makes her legs swell. Per , the patient was prescribed another type of Psych med but it was not helping her. After talking to the patient's , he was guided to the  patient's room. Patient refused external cath.

## 2021-01-06 LAB
ALBUMIN SERPL-MCNC: 3.2 G/DL (ref 3.5–5)
ALBUMIN/GLOB SERPL: 0.8 {RATIO} (ref 1.1–2.2)
ALP SERPL-CCNC: 50 U/L (ref 45–117)
ALT SERPL-CCNC: 21 U/L (ref 12–78)
ANION GAP SERPL CALC-SCNC: 6 MMOL/L (ref 5–15)
APAP SERPL-MCNC: <2 UG/ML (ref 10–30)
AST SERPL-CCNC: 10 U/L (ref 15–37)
BILIRUB SERPL-MCNC: 0.7 MG/DL (ref 0.2–1)
BUN SERPL-MCNC: 10 MG/DL (ref 6–20)
BUN/CREAT SERPL: 16 (ref 12–20)
CALCIUM SERPL-MCNC: 8.7 MG/DL (ref 8.5–10.1)
CHLORIDE SERPL-SCNC: 112 MMOL/L (ref 97–108)
CO2 SERPL-SCNC: 22 MMOL/L (ref 21–32)
CREAT SERPL-MCNC: 0.62 MG/DL (ref 0.55–1.02)
ERYTHROCYTE [DISTWIDTH] IN BLOOD BY AUTOMATED COUNT: 12.9 % (ref 11.5–14.5)
GLOBULIN SER CALC-MCNC: 3.8 G/DL (ref 2–4)
GLUCOSE SERPL-MCNC: 108 MG/DL (ref 65–100)
HCT VFR BLD AUTO: 39.3 % (ref 35–47)
HGB BLD-MCNC: 12.9 G/DL (ref 11.5–16)
INR PPP: 1.2 (ref 0.9–1.1)
MCH RBC QN AUTO: 28.5 PG (ref 26–34)
MCHC RBC AUTO-ENTMCNC: 32.8 G/DL (ref 30–36.5)
MCV RBC AUTO: 86.9 FL (ref 80–99)
NRBC # BLD: 0 K/UL (ref 0–0.01)
NRBC BLD-RTO: 0 PER 100 WBC
PLATELET # BLD AUTO: 288 K/UL (ref 150–400)
PMV BLD AUTO: 10.1 FL (ref 8.9–12.9)
POTASSIUM SERPL-SCNC: 3.1 MMOL/L (ref 3.5–5.1)
PROT SERPL-MCNC: 7 G/DL (ref 6.4–8.2)
PROTHROMBIN TIME: 12.1 SEC (ref 9–11.1)
RBC # BLD AUTO: 4.52 M/UL (ref 3.8–5.2)
SODIUM SERPL-SCNC: 140 MMOL/L (ref 136–145)
WBC # BLD AUTO: 6.3 K/UL (ref 3.6–11)

## 2021-01-06 PROCEDURE — 85610 PROTHROMBIN TIME: CPT

## 2021-01-06 PROCEDURE — 36415 COLL VENOUS BLD VENIPUNCTURE: CPT

## 2021-01-06 PROCEDURE — 74011250637 HC RX REV CODE- 250/637: Performed by: HOSPITALIST

## 2021-01-06 PROCEDURE — 74011250636 HC RX REV CODE- 250/636: Performed by: HOSPITALIST

## 2021-01-06 PROCEDURE — 74011250636 HC RX REV CODE- 250/636: Performed by: INTERNAL MEDICINE

## 2021-01-06 PROCEDURE — 93005 ELECTROCARDIOGRAM TRACING: CPT

## 2021-01-06 PROCEDURE — 85027 COMPLETE CBC AUTOMATED: CPT

## 2021-01-06 PROCEDURE — 80143 DRUG ASSAY ACETAMINOPHEN: CPT

## 2021-01-06 PROCEDURE — 80053 COMPREHEN METABOLIC PANEL: CPT

## 2021-01-06 PROCEDURE — 65660000000 HC RM CCU STEPDOWN

## 2021-01-06 RX ORDER — POTASSIUM CHLORIDE 7.45 MG/ML
10 INJECTION INTRAVENOUS
Status: DISCONTINUED | OUTPATIENT
Start: 2021-01-06 | End: 2021-01-06

## 2021-01-06 RX ORDER — MORPHINE SULFATE 2 MG/ML
1 INJECTION, SOLUTION INTRAMUSCULAR; INTRAVENOUS ONCE
Status: COMPLETED | OUTPATIENT
Start: 2021-01-06 | End: 2021-01-06

## 2021-01-06 RX ORDER — TRAMADOL HYDROCHLORIDE 50 MG/1
50 TABLET ORAL
Status: DISCONTINUED | OUTPATIENT
Start: 2021-01-06 | End: 2021-01-07 | Stop reason: HOSPADM

## 2021-01-06 RX ORDER — POTASSIUM CHLORIDE 750 MG/1
40 TABLET, FILM COATED, EXTENDED RELEASE ORAL
Status: COMPLETED | OUTPATIENT
Start: 2021-01-07 | End: 2021-01-07

## 2021-01-06 RX ORDER — ONDANSETRON 2 MG/ML
4 INJECTION INTRAMUSCULAR; INTRAVENOUS
Status: DISCONTINUED | OUTPATIENT
Start: 2021-01-06 | End: 2021-01-07 | Stop reason: HOSPADM

## 2021-01-06 RX ADMIN — TRAMADOL HYDROCHLORIDE 50 MG: 50 TABLET, FILM COATED ORAL at 22:19

## 2021-01-06 RX ADMIN — ENOXAPARIN SODIUM 40 MG: 40 INJECTION SUBCUTANEOUS at 22:18

## 2021-01-06 RX ADMIN — SODIUM CHLORIDE 150 ML/HR: 9 INJECTION, SOLUTION INTRAVENOUS at 04:50

## 2021-01-06 RX ADMIN — SODIUM CHLORIDE 150 ML/HR: 9 INJECTION, SOLUTION INTRAVENOUS at 18:19

## 2021-01-06 RX ADMIN — SODIUM CHLORIDE 150 ML/HR: 9 INJECTION, SOLUTION INTRAVENOUS at 22:18

## 2021-01-06 RX ADMIN — MORPHINE SULFATE 1 MG: 2 INJECTION, SOLUTION INTRAMUSCULAR; INTRAVENOUS at 19:24

## 2021-01-06 RX ADMIN — ENOXAPARIN SODIUM 40 MG: 40 INJECTION SUBCUTANEOUS at 09:45

## 2021-01-06 RX ADMIN — Medication 10 ML: at 22:24

## 2021-01-06 RX ADMIN — ONDANSETRON 4 MG: 2 INJECTION INTRAMUSCULAR; INTRAVENOUS at 22:19

## 2021-01-06 RX ADMIN — POTASSIUM CHLORIDE 10 MEQ: 10 INJECTION, SOLUTION INTRAVENOUS at 18:19

## 2021-01-06 NOTE — PROGRESS NOTES
8154: Bedside shift change report given to Nesha Zhang RN (oncoming nurse) by Bright Blood RN (offgoing nurse). Report included the following information SBAR, Kardex, Intake/Output, Recent Results and Cardiac Rhythm NSR.

## 2021-01-06 NOTE — CONSULTS
PSYCHIATRY CONSULT NOTE:    REASON FOR CONSULT: overdose      HISTORY OF PRESENTING COMPLAINT:  Krys Gamboa is a 28 y.o. PATIENT REFUSED  [place]ITE [place]  female who is currently in the ED at [place] [address] for an overdose. She denies any overdose but her acetaminophen level was 152. She feels that her current psychiatric medications are not effective and her outpatient provider has been changing her long acting injectable. Her  is at the bedside and confirms that his wife has not been doing well and has increased paranoia. She denies SI/HI/AH/VH. She states that her mood is fine. She feels that her family has cameras all over her house to watch her. Her  reports that she did well for 2 years after her admission to [facility] and was able to work during that time. She then abruptly quit her job do to paranoia. PAST PSYCHIATRIC HISTORY and SUBSTANCE ABUSE HISTORY:  At least 2 psychiatric admissions, one to [facility] and one to [facility] in 2018      PAST MEDICAL HISTORY:  Please see H&P for details.    Past Medical History:   Diagnosis Date    Calculus of kidney     Depression 1/5/2021    H/O headache 7/8/2013    Headache(784.0)     Kidney disease     Pseudotumor cerebri syndrome 7/8/2013           Lab Results   Component Value Date/Time    WBC 6.3 01/06/2021 02:25 AM    HGB 12.9 01/06/2021 02:25 AM    HCT 39.3 01/06/2021 02:25 AM    PLATELET 811 66/53/7755 02:25 AM    MCV 86.9 01/06/2021 02:25 AM    Hgb, External 12.9 g/dL 07/10/2013 07:36 AM    Hct, External 37.3 % 07/10/2013 07:36 AM    Platelet cnt., External 326 x10E3/uL 07/10/2013 07:36 AM      Lab Results   Component Value Date/Time    Sodium 140 01/06/2021 02:25 AM    Potassium 3.1 (L) 01/06/2021 02:25 AM    Chloride 112 (H) 01/06/2021 02:25 AM    CO2 22 01/06/2021 02:25 AM    Anion gap 6 01/06/2021 02:25 AM    Glucose 108 (H) 01/06/2021 02:25 AM    BUN 10 01/06/2021 02:25 AM    Creatinine 0.62 01/06/2021 02:25 AM    BUN/Creatinine ratio 16 01/06/2021 02:25 AM    GFR est AA >60 01/06/2021 02:25 AM    GFR est non-AA >60 01/06/2021 02:25 AM    Calcium 8.7 01/06/2021 02:25 AM    Bilirubin, total 0.7 01/06/2021 02:25 AM    Alk. phosphatase 50 01/06/2021 02:25 AM    Protein, total 7.0 01/06/2021 02:25 AM    Albumin 3.2 (L) 01/06/2021 02:25 AM    Globulin 3.8 01/06/2021 02:25 AM    A-G Ratio 0.8 (L) 01/06/2021 02:25 AM    ALT (SGPT) 21 01/06/2021 02:25 AM          PSYCHOSOCIAL HISTORY:  Lives with  and 3 children      MENTAL STATUS EXAM:    General appearance: Appropriately groomed, psychomotor activity is wnl  Eye contact: Good  Speech: Spontaneous, fluent  Affect : blunted  Mood: \"fine \"  Thought Process: paranoid  Perception: Denies AH or VH. Thought Content: Denies SI or Plan  Insight: Partial  Judgement: Fair  Cognition: Intact grossly. ASSESSMENT AND PLAN:  Charity Jose meets criteria for a diagnosis of schizophrenia, paranoid type. 1. She is willing to be admitted to inpatient psychiatry once she is medically stable, should she change her mind and decline to remain voluntary, please contact [phone] for assess for a TDO given her recent suicide attempt. 2. She will need a negative COVID test for placement on a psychiatric unit. Thank you for the consultation.     Lorrie Lechuga, PMHNP-BC  January 6, 2021

## 2021-01-06 NOTE — PROGRESS NOTES
Bedside and Verbal shift change report given to Rosanna Browne RN (oncoming nurse) by Stevo Holland RN (offgoing nurse).  Report included the following information SBAR, Kardex, Intake/Output, MAR and Recent Results.

## 2021-01-06 NOTE — PROGRESS NOTES
1/6/2021  5:54 PM  Case management note    Received consult for transfer to a psych hospital for inpatient therapy. I tried calling transfer center, they do not do psych transfers. Left message for [facility] [place] and as of now have not received return call. Patient was not cleared medically as of now. I notified Dr. Jeffrey Nephew and the NP from psychiatry and neither were able to assist me with transfer. CM will continue to follow for possible discharge tomorrow.   Kumar Huerta

## 2021-01-06 NOTE — PROGRESS NOTES
[address] [facility] Adult  Hospitalist Group                                                                                          Hospitalist Progress Note  Miguelangel Cochran MD  Answering service: [phone] OR [phone] from in house phone        Date of Service:  2021  NAME:  Mary Lou Mehta  :  1985  MRN:  [id number]      Admission Summary:   Patient is a 28 YOF w/Hx depression and at least two prior IP admissions for psychiatric care who is admitted for Acetaminophen overdose, receiving last dose of Mucomyst now, and awaiting psych placement. Interval history / Subjective:    Patient has done well overnight. She states she has had several BMs and that her bowels are moving faster. She has no other complaints. Assessment & Plan:       Overdose 2/2 Severe depression:   --- Patient endorses ingestion and is wanting admit to psych  --- Continue IVF  --- Last dose of Mucomyst going now  --- Liver labs look good  --- Continue to trend CMP and INR as last dose of mucomyst completes  --- Tylenol levels down significantly  --- Psychiatry has seen patient  --- Will send repeat lithium level and monitor on telemetry  --- Repeat EKG in am  --- Hold all home meds including cogentin, lithium, ivega ustenna and topamax  --- Sitter in place  --- We appreciate psychiatry     Code status: Full  DVT prophylaxis: Lovenox    Care Plan discussed with: Patient/Family  Anticipated Disposition: Psych admit  Anticipated Discharge: Less than 24 hours     Hospital Problems  Date Reviewed: 2017          Codes Class Noted POA    * (Principal) Overdose ICD-10-CM: T50.901A  ICD-9-CM: 977.9, E980.5  2021 Unknown        Depression ICD-10-CM: F32.9  ICD-9-CM: 620  2021 Unknown                Review of Systems:   A comprehensive review of systems was negative except for that written in the HPI. Vital Signs:    Last 24hrs VS reviewed since prior progress note.  Most recent are:  Visit Vitals  /78 Pulse 85   Temp 98.2 °F (36.8 °C)   Resp 20   Ht 5' 2\" (1.575 m)   Wt 117.9 kg (260 lb)   SpO2 99%   BMI 47.55 kg/m²         Intake/Output Summary (Last 24 hours) at 1/6/2021 1405  Last data filed at 1/6/2021 0930  Gross per 24 hour   Intake 1265.2 ml   Output    Net 1265.2 ml        Physical Examination:     I had a face to face encounter with this patient and independently examined them on 1/6/2021 as outlined below:          Constitutional:  No acute distress, cooperative, pleasant    ENT:  Oral mucosa moist, oropharynx benign. Resp:  CTA bilaterally. No wheezing/rhonchi/rales. No accessory muscle use   CV:  Regular rhythm, normal rate, no murmurs, gallops, rubs    GI:  Soft, non distended, non tender. normoactive bowel sounds, no hepatosplenomegaly     Musculoskeletal:  No edema, warm, 2+ pulses throughout    Neurologic:  Moves all extremities. AAOx3, CN II-XII reviewed     Psych:  Poor insight, flat affect, depressed       Data Review:    Review and/or order of clinical lab test      Labs:     Recent Labs     01/06/21 0225 01/05/21  1036   WBC 6.3 6.6   HGB 12.9 13.8   HCT 39.3 40.8    338     Recent Labs     01/06/21 0225 01/05/21  1036    139   K 3.1* 3.0*   * 111*   CO2 22 20*   BUN 10 13   CREA 0.62 0.78   * 136*   CA 8.7 9.2     Recent Labs     01/06/21 0225 01/05/21  1036   ALT 21 18   AP 50 63   TBILI 0.7 0.7   TP 7.0 8.1   ALB 3.2* 3.9   GLOB 3.8 4.2*     Recent Labs     01/06/21  0225   INR 1.2*   PTP 12.1*      No results for input(s): FE, TIBC, PSAT, FERR in the last 72 hours.    No results found for: FOL, RBCF   Recent Labs     01/05/21  1032   PH 7.44   PCO2 29*   PO2 88     Recent Labs     01/05/21  1036   TROIQ <0.05     Lab Results   Component Value Date/Time    Cholesterol, total 167 03/04/2015 01:22 PM    HDL Cholesterol 46 03/04/2015 01:22 PM    LDL, calculated 96 03/04/2015 01:22 PM    Triglyceride 126 03/04/2015 01:22 PM     Lab Results   Component Value Date/Time    Glucose (POC) 105 (H) 01/05/2021 10:42 AM     Lab Results   Component Value Date/Time    Color YELLOW/STRAW 01/05/2021 10:36 AM    Appearance CLOUDY (A) 01/05/2021 10:36 AM    Specific gravity 1.025 01/05/2021 10:36 AM    Specific gravity 1.020 07/25/2016 06:23 AM    pH (UA) 5.0 01/05/2021 10:36 AM    Protein Negative 01/05/2021 10:36 AM    Glucose Negative 01/05/2021 10:36 AM    Ketone Negative 01/05/2021 10:36 AM    Bilirubin Negative 01/05/2021 10:36 AM    Urobilinogen 0.2 01/05/2021 10:36 AM    Nitrites Negative 01/05/2021 10:36 AM    Leukocyte Esterase TRACE (A) 01/05/2021 10:36 AM    Epithelial cells FEW 01/05/2021 10:36 AM    Bacteria Negative 01/05/2021 10:36 AM    WBC 0-4 01/05/2021 10:36 AM    RBC 0-5 01/05/2021 10:36 AM         Medications Reviewed:     Current Facility-Administered Medications   Medication Dose Route Frequency    sodium chloride (NS) flush 5-40 mL  5-40 mL IntraVENous Q8H    sodium chloride (NS) flush 5-40 mL  5-40 mL IntraVENous PRN    polyethylene glycol (MIRALAX) packet 17 g  17 g Oral DAILY PRN    enoxaparin (LOVENOX) injection 40 mg  40 mg SubCUTAneous Q12H    0.9% sodium chloride infusion  150 mL/hr IntraVENous CONTINUOUS     Current Outpatient Medications   Medication Sig    paliperidone palmitate (Invega Sustenna) 78 mg/0.5 mL injection 78 mg by IntraMUSCular route every thirty (30) days.  lithium carbonate 600 mg capsule Take 600 mg by mouth daily.  topiramate (TOPAMAX) 100 mg tablet Take 200 mg by mouth daily.  topiramate (TOPAMAX) 100 mg tablet Take 100 mg by mouth nightly.  benztropine (COGENTIN) 1 mg tablet Take 1 mg by mouth two (2) times daily as needed (soreness in muscles, stiffness).  lithium carbonate CR (ESKALITH CR) 450 mg CR tablet Take 450 mg by mouth daily.      _____________    Total Time spent with patient and family - 28 Minutes_________________________________________________________  EXPECTED LENGTH OF STAY: 2d 7h  ACTUAL LENGTH OF STAY:          1                 Inez Hussein MD

## 2021-01-07 ENCOUNTER — HOSPITAL ENCOUNTER (INPATIENT)
Age: 36
LOS: 4 days | Discharge: HOME | DRG: 885 | End: 2021-01-11
Attending: PSYCHIATRY & NEUROLOGY | Admitting: PSYCHIATRY & NEUROLOGY
Payer: MEDICARE

## 2021-01-07 VITALS
HEART RATE: 76 BPM | TEMPERATURE: 98.2 F | SYSTOLIC BLOOD PRESSURE: 121 MMHG | RESPIRATION RATE: 18 BRPM | OXYGEN SATURATION: 100 % | HEIGHT: 62 IN | DIASTOLIC BLOOD PRESSURE: 73 MMHG | WEIGHT: 260 LBS | BODY MASS INDEX: 47.84 KG/M2

## 2021-01-07 LAB
ALBUMIN SERPL-MCNC: 2.9 G/DL (ref 3.5–5)
ALBUMIN/GLOB SERPL: 0.8 {RATIO} (ref 1.1–2.2)
ALP SERPL-CCNC: 48 U/L (ref 45–117)
ALT SERPL-CCNC: 15 U/L (ref 12–78)
ANION GAP SERPL CALC-SCNC: 5 MMOL/L (ref 5–15)
AST SERPL-CCNC: 10 U/L (ref 15–37)
ATRIAL RATE: 78 BPM
BILIRUB DIRECT SERPL-MCNC: 0.1 MG/DL (ref 0–0.2)
BILIRUB SERPL-MCNC: 0.5 MG/DL (ref 0.2–1)
BUN SERPL-MCNC: 12 MG/DL (ref 6–20)
BUN/CREAT SERPL: 23 (ref 12–20)
CALCIUM SERPL-MCNC: 8.8 MG/DL (ref 8.5–10.1)
CALCULATED P AXIS, ECG09: 23 DEGREES
CALCULATED R AXIS, ECG10: 15 DEGREES
CALCULATED T AXIS, ECG11: 16 DEGREES
CHLORIDE SERPL-SCNC: 109 MMOL/L (ref 97–108)
CO2 SERPL-SCNC: 24 MMOL/L (ref 21–32)
COVID-19 RAPID TEST, COVR: NOT DETECTED
CREAT SERPL-MCNC: 0.52 MG/DL (ref 0.55–1.02)
DIAGNOSIS, 93000: NORMAL
GLOBULIN SER CALC-MCNC: 3.5 G/DL (ref 2–4)
GLUCOSE SERPL-MCNC: 108 MG/DL (ref 65–100)
HCG UR QL: NEGATIVE
HEALTH STATUS, XMCV2T: NORMAL
MAGNESIUM SERPL-MCNC: 1.9 MG/DL (ref 1.6–2.4)
P-R INTERVAL, ECG05: 166 MS
POTASSIUM SERPL-SCNC: 3.4 MMOL/L (ref 3.5–5.1)
PROT SERPL-MCNC: 6.4 G/DL (ref 6.4–8.2)
Q-T INTERVAL, ECG07: 384 MS
QRS DURATION, ECG06: 88 MS
QTC CALCULATION (BEZET), ECG08: 437 MS
SODIUM SERPL-SCNC: 138 MMOL/L (ref 136–145)
SOURCE, COVRS: NORMAL
SPECIMEN SOURCE, FCOV2M: NORMAL
SPECIMEN TYPE, XMCV1T: NORMAL
VENTRICULAR RATE, ECG03: 78 BPM

## 2021-01-07 PROCEDURE — 65220000003 HC RM SEMIPRIVATE PSYCH

## 2021-01-07 PROCEDURE — 83735 ASSAY OF MAGNESIUM: CPT

## 2021-01-07 PROCEDURE — 80048 BASIC METABOLIC PNL TOTAL CA: CPT

## 2021-01-07 PROCEDURE — 74011250637 HC RX REV CODE- 250/637: Performed by: PSYCHIATRY & NEUROLOGY

## 2021-01-07 PROCEDURE — 81025 URINE PREGNANCY TEST: CPT

## 2021-01-07 PROCEDURE — 74011250637 HC RX REV CODE- 250/637: Performed by: HOSPITALIST

## 2021-01-07 PROCEDURE — 36415 COLL VENOUS BLD VENIPUNCTURE: CPT

## 2021-01-07 PROCEDURE — 74011250636 HC RX REV CODE- 250/636: Performed by: INTERNAL MEDICINE

## 2021-01-07 PROCEDURE — 80076 HEPATIC FUNCTION PANEL: CPT

## 2021-01-07 PROCEDURE — 74011250637 HC RX REV CODE- 250/637: Performed by: INTERNAL MEDICINE

## 2021-01-07 PROCEDURE — 87635 SARS-COV-2 COVID-19 AMP PRB: CPT

## 2021-01-07 RX ORDER — DIPHENHYDRAMINE HYDROCHLORIDE 50 MG/ML
50 INJECTION, SOLUTION INTRAMUSCULAR; INTRAVENOUS
Status: DISCONTINUED | OUTPATIENT
Start: 2021-01-07 | End: 2021-01-11 | Stop reason: HOSPADM

## 2021-01-07 RX ORDER — ADHESIVE BANDAGE
30 BANDAGE TOPICAL DAILY PRN
Status: DISCONTINUED | OUTPATIENT
Start: 2021-01-07 | End: 2021-01-11 | Stop reason: HOSPADM

## 2021-01-07 RX ORDER — LORAZEPAM 2 MG/ML
1 INJECTION INTRAMUSCULAR
Status: DISCONTINUED | OUTPATIENT
Start: 2021-01-07 | End: 2021-01-11 | Stop reason: HOSPADM

## 2021-01-07 RX ORDER — ACETAMINOPHEN 325 MG/1
650 TABLET ORAL
Status: DISCONTINUED | OUTPATIENT
Start: 2021-01-07 | End: 2021-01-11 | Stop reason: HOSPADM

## 2021-01-07 RX ORDER — POTASSIUM CHLORIDE 750 MG/1
40 TABLET, FILM COATED, EXTENDED RELEASE ORAL ONCE
Status: COMPLETED | OUTPATIENT
Start: 2021-01-07 | End: 2021-01-07

## 2021-01-07 RX ORDER — OLANZAPINE 5 MG/1
5 TABLET ORAL
Status: DISCONTINUED | OUTPATIENT
Start: 2021-01-07 | End: 2021-01-11 | Stop reason: HOSPADM

## 2021-01-07 RX ORDER — TRAZODONE HYDROCHLORIDE 50 MG/1
50 TABLET ORAL
Status: DISCONTINUED | OUTPATIENT
Start: 2021-01-07 | End: 2021-01-11 | Stop reason: HOSPADM

## 2021-01-07 RX ORDER — BENZTROPINE MESYLATE 1 MG/1
1 TABLET ORAL
Status: DISCONTINUED | OUTPATIENT
Start: 2021-01-07 | End: 2021-01-11 | Stop reason: HOSPADM

## 2021-01-07 RX ORDER — HYDROXYZINE 25 MG/1
50 TABLET, FILM COATED ORAL
Status: DISCONTINUED | OUTPATIENT
Start: 2021-01-07 | End: 2021-01-11 | Stop reason: HOSPADM

## 2021-01-07 RX ORDER — HALOPERIDOL 5 MG/ML
5 INJECTION INTRAMUSCULAR
Status: DISCONTINUED | OUTPATIENT
Start: 2021-01-07 | End: 2021-01-11 | Stop reason: HOSPADM

## 2021-01-07 RX ADMIN — POTASSIUM CHLORIDE 40 MEQ: 750 TABLET, FILM COATED, EXTENDED RELEASE ORAL at 08:50

## 2021-01-07 RX ADMIN — ENOXAPARIN SODIUM 40 MG: 40 INJECTION SUBCUTANEOUS at 08:50

## 2021-01-07 RX ADMIN — POTASSIUM CHLORIDE 40 MEQ: 750 TABLET, FILM COATED, EXTENDED RELEASE ORAL at 03:18

## 2021-01-07 RX ADMIN — ACETAMINOPHEN 650 MG: 325 TABLET, FILM COATED ORAL at 15:30

## 2021-01-07 RX ADMIN — TRAMADOL HYDROCHLORIDE 50 MG: 50 TABLET, FILM COATED ORAL at 04:49

## 2021-01-07 RX ADMIN — TRAMADOL HYDROCHLORIDE 50 MG: 50 TABLET, FILM COATED ORAL at 12:21

## 2021-01-07 RX ADMIN — POTASSIUM CHLORIDE 40 MEQ: 750 TABLET, FILM COATED, EXTENDED RELEASE ORAL at 04:47

## 2021-01-07 NOTE — BH NOTES
1410-Patient was admitted to the unit from [address] [place] [address] for possible overdose on Tylenol, patient is alert and orient x 4, complain of headache at this time, and complain of stomach ache 3/10, Patient  Triggered high on the [address] screening but  She denies SI/ and HI, she stated that she doesn't no want to hurt herself and doesn't, have a plan. She states she took the pills because she was overwhelmed and to many things were coming at her at once. She is ambulatory at this time, and was orientated to the unit at this time. Dr Yuli Lal was notified and stated to take the patient off Suicide precaution because she is not endorsing suicide  at this time.  Patient  orientated to the unit and  Given all instruction

## 2021-01-07 NOTE — PROGRESS NOTES
Rounded on [employer] patients and provided Anointing of the Sick at request of patient.     Inga Litten

## 2021-01-07 NOTE — ED NOTES
Bedside and Verbal shift change report given to Triny Oden RN (oncoming nurse) by AVNI Gamboa (offgoing nurse). Report included the following information SBAR, Kardex, ED Summary, Intake/Output, MAR and Recent Results. Pt cleared by poison control.

## 2021-01-07 NOTE — PROGRESS NOTES
Problem: Falls - Risk of  Goal: *Absence of Falls  Description: Document Jenny Blood Fall Risk and appropriate interventions in the flowsheet.   Outcome: Progressing Towards Goal  Note: Fall Risk Interventions:            Medication Interventions: Teach patient to arise slowly                   Problem: Patient Education: Go to Patient Education Activity  Goal: Patient/Family Education  Outcome: Progressing Towards Goal     Problem: Suicide  Goal: *STG:  Verbalizes alternative ways of dealing with maladaptive feelings/behaviors  Outcome: Progressing Towards Goal  Goal: *STG/LTG: Complies with medication therapy  Outcome: Progressing Towards Goal

## 2021-01-07 NOTE — PROGRESS NOTES
1/7/2021   12:24 PM  [facility] transport set up with ETA, 1 hour. Nursing and MD notified. PCS form completed and provided to RN. 11:56 AM  COVID results received, rapid is negative. CM called Pat Martinez at [facility] bedboard to update on results. CM called [employer] ( [phone]) to set up transport, pending ETA. Nursing notified to complete EMTALA, and call report to: [phone], pt will be going to [facility], Room 311, Bed 1. CM spoke with Dr. Filiberto Navarro to provide update, and will put in d/c orders. 11:09 AM  COVID test has been sent out.     9:09 AM  CM received update from Gui at [facility] and they will admit pt at [facility]. Dr. Tiarra Ott is accepting MD.  Pt will be going to Room 311, bed 1. Number to call report: [phone]. Pending COVID test, before setting up transportation. 8:34 AM  CM received call back from Gui at [facility] noting that pt will need to have COVID test completed before they can admit. They will accept rapid result and will need PCR processed, RN notified.      8:01 AM  CM called [facility] bed board, [phone] confirmed they are aware of pt needing IP psych admission. ALBARO confirmed pt is medically stable and is voluntary, per MD's consult. Gui will contact psych MD and will return call to provide update. ALBARO following.     James Griffith

## 2021-01-07 NOTE — PROGRESS NOTES
Shift Change:    Disaster Charting     Bedside and Verbal shift change report given to AVNI Kurtz (oncoming nurse) by Chapito Rubin RN (offgoing nurse). Report included the following information SBAR, Kardex, Intake/Output, MAR, Recent Results and Cardiac Rhythm NSR.       1056: Rapid and PCR COVID swab sent to lab       Discharge to [address] [place] [address] by DANIEL     I have reviewed discharge instructions with the patient. The patient verbalized understanding.     Report called to [place] RN

## 2021-01-07 NOTE — DISCHARGE SUMMARY
Discharge Summary       PATIENT ID: Ling Barrett  MRN: [id number]   YOB: 1985    DATE OF ADMISSION: 1/5/2021 10:25 AM    DATE OF DISCHARGE: 1/7/2021   PRIMARY CARE PROVIDER: Pepe Winslow MD     ATTENDING PHYSICIAN: Matilda Ortiz  DISCHARGING PROVIDER: Oleg Hinds MD    To contact this individual call [phone] and ask the  to page. If unavailable ask to be transferred the Adult Hospitalist Department. CONSULTATIONS: IP CONSULT TO PSYCHIATRY    PROCEDURES/SURGERIES: * No surgery found *    ADMITTING DIAGNOSES & HOSPITAL COURSE:   Acetaminophen overdose  Suicide attempt  Depression, severe  Migraine        DISCHARGE DIAGNOSES / PLAN:      1. Overdose secondary to severe depression  Patient was admitted for acute acetaminophen overdose. Acetaminophen levels were markedly elevated, patient was provided a course of Mucomyst.  She received her last dose of Mucomyst on 1/6/2021. She was kept inpatient to monitor liver function with PT, PTT, INR, and hepatic panel. Patient had normal liver function and was deemed medically stable for discharge to an inpatient psychiatric facility. 2. Severe depressionand patient psychiatric facility transfer as above.   Patient will be transferred to [facility] for further care     ADDITIONAL CARE RECOMMENDATIONS:   none     PENDING TEST RESULTS:   At the time of discharge the following test results are still pending: none    FOLLOW UP APPOINTMENTS:    Follow-up Information     Follow up With Specialties Details Why Contact Info    Pepe Winslow MD Internal Medicine   [address] [facility]  [address]  Critical access hospital [phone]               DIET: Regular Diet  Oral Nutritional Supplements: None    ACTIVITY: Activity as tolerated    WOUND CARE: None    EQUIPMENT needed: None      DISCHARGE MEDICATIONS: These other medications that patient was on prior to admission at [facility]. She is being transferred to an inpatient psychiatric facility where psychiatry will adjust her medications as needed. Current Discharge Medication List      CONTINUE these medications which have NOT CHANGED    Details   paliperidone palmitate (Invega Sustenna) 78 mg/0.5 mL injection 78 mg by IntraMUSCular route every thirty (30) days. lithium carbonate 600 mg capsule Take 600 mg by mouth daily. !! topiramate (TOPAMAX) 100 mg tablet Take 200 mg by mouth daily. !! topiramate (TOPAMAX) 100 mg tablet Take 100 mg by mouth nightly. benztropine (COGENTIN) 1 mg tablet Take 1 mg by mouth two (2) times daily as needed (soreness in muscles, stiffness). lithium carbonate CR (ESKALITH CR) 450 mg CR tablet Take 450 mg by mouth daily. !! - Potential duplicate medications found. Please discuss with provider. NOTIFY YOUR PHYSICIAN FOR ANY OF THE FOLLOWING:   Fever over 101 degrees for 24 hours. Chest pain, shortness of breath, fever, chills, nausea, vomiting, diarrhea, change in mentation, falling, weakness, bleeding. Severe pain or pain not relieved by medications. Or, any other signs or symptoms that you may have questions about. DISPOSITION:    Home With:   OT  PT  HH  RN       Long term SNF/Inpatient Rehab    Independent/assisted living    Hospice    Other:       PATIENT CONDITION AT DISCHARGE:     Functional status    Poor     Deconditioned     Independent      Cognition     Lucid     Forgetful     Dementia      Catheters/lines (plus indication)    Quiñones     PICC     PEG     None      Code status     Full code     DNR      PHYSICAL EXAMINATION AT DISCHARGE:  General:          Alert, cooperative, no distress, appears stated age.      HEENT:           Atraumatic, anicteric sclerae, pink conjunctivae                          No oral ulcers, mucosa moist, throat clear, dentition fair  Neck:               Supple, symmetrical  Lungs:             Clear to auscultation bilaterally. No Wheezing or Rhonchi. No rales. Chest wall:      No tenderness  No Accessory muscle use. Heart:              Regular  rhythm,  No  murmur   No edema  Abdomen:        Soft, non-tender. Not distended. Bowel sounds normal  Extremities:     No cyanosis. No clubbing,                            Skin turgor normal, Capillary refill normal  Skin:                Not pale. Not Jaundiced  No rashes   Psych:             Not anxious or agitated. Neurologic:      Alert, moves all extremities, answers questions appropriately and responds to commands       CHRONIC MEDICAL DIAGNOSES:  Problem List as of 2021 Date Reviewed: 2017          Codes Class Noted - Resolved    * (Principal) Drug overdose ICD-10-CM: T50.901A  ICD-9-CM: 977.9, E980.5  2021 - Present        Depression ICD-10-CM: F32.9  ICD-9-CM: 250  2021 - Present        Mild carpal tunnel syndrome ICD-10-CM: G56.00  ICD-9-CM: 354.0  2016 - Present        Ulnar neuropathy of left upper extremity ICD-10-CM: G56.22  ICD-9-CM: 354.2  2016 - Present        H/O headache ICD-10-CM: Z87.898  ICD-9-CM: V13.89  2013 - Present        RESOLVED: Active labor at term ICD-10-CM: [place]  ICD-9-CM: [place]  2014 - 3/10/2014        RESOLVED: Supervision of other high-risk pregnancy(V23.89) ICD-10-CM: O09.899  ICD-9-CM: V23.89  2013 - 2014    Overview Addendum 2013 10:27 AM by Rayo Flores     32 y.o.  CONCHITA: 2014, by Last Menstrual Period   Patient Active Problem List   Diagnoses Code    Pseudotumor cerebri syndrome 348.2    H/O headache V13.89     [facility] scan 13 recommend scan in 12 weeks to assess growth               RESOLVED: Pseudotumor cerebri syndrome ICD-10-CM: G93.2  ICD-9-CM: 348.2  2013 - 2014    Overview Signed 2013  8:57 AM by Rayo DANIELS recommendations:    Pregnancy does not worsen this condition in the short or long term.  No increased visual loss rates have  been reported in women who had pregnancies. IHI also does not adversely affect the pregnancy. No  increase in miscarriages or  deliveries is reported. Treatment: Acetazolamide is the most-commonly used drug in this condition. It reduces the CSF production  up to 50% in some cases, thereby, providing relief in symptoms. It is a category C drug. [facility] found non increased congenital malformations in over 1024 women exposed to this drug. I  informed the patient that she should consult her neuro-opthalmologist before discontinuing this medication. If essential, it can be continued through the pregnancy (to decrease the likelihood of visual loss). Common side-effects include tingling in the hands and toes, metallic taste when carbonated drinks are  consumed. Breastfeeding: According to the American Academy of Pediatrics, acetazolamide is compatible with  breastfeeding. Other drugs used to treat IHI are topiramate and steroids. Topiramate can be used in pregnancy. A small  increase in the incidence of facial clefts has been reported with this drug. Corticosteroids should be avoided  as they tend to cause weight gain (worsen IHI). Regional anesthesia is safe and vaginal delivery is safe. Labor increases cardiac output and, consequently,  increase CSF pressure. But this does not increase the risk for visual loss. Epidural analgesia with  reasonable maternal effort in the second stage is safe. Outlet forceps or vacuum-assisted delivery may be  required.              RESOLVED: Hypertension due to intracranial pressure ICD-10-CM: BCS0864  ICD-9-CM: WCM1819  2013 - 2013              Greater than 50 minutes were spent with the patient on counseling and coordination of care    Signed:   Yary Tabares MD  2021  12:06 PM   .

## 2021-01-07 NOTE — PROGRESS NOTES
Handoff report initiated for transfer from ER to [phone]; patient has order for a Rapid COVID swab to be done ; current ER RN will verify order and if needs to be done then patient transfer will be on hold until negative test results

## 2021-01-07 NOTE — GROUP NOTE
UTE  GROUP DOCUMENTATION INDIVIDUAL Group Therapy Note Date: 1/7/2021 Group Start Time: 1400 Group End Time: 1500 Group Topic: Recreational/Music Therapy [address] [facility] 3 ACUTE BEHAV [facility] [place], [address] [facility] GROUP DOCUMENTATION GROUP Group Therapy Note Attendees: 3 Attendance: Did not attend Patient's Goal: Interventions/techniques Kiarra Villa

## 2021-01-07 NOTE — ED NOTES
Introduced self as primary RN to pt, her  and sitter. VSS. Bed locked and in low position with call bell within reach. Instructed pt to press call cheatham when needed. White board updated with this RN's name.

## 2021-01-07 NOTE — ED NOTES
Spoke with Dr. Angela Lawson to inquire if rapid COVID test will be needed. Informed him per report from off going RN that pt is to be discharged tomorrow once she is medically cleared. Pt is now assigned to a non-COVID room and will require a negative result if she's to be going into the room. Per Dr. Angela Lawson, test can be cancelled.

## 2021-01-07 NOTE — ED NOTES
Spoke with Dr Taylor Gates and made her aware that the patient is moaning, crying out and thrashing around the stretcher due to her headache. Patient reports she takes Aleve daily at home for her headaches- Verbal order received for 1mg IV Morphine now and 1mg IV morphine in 1 hour if needed for ongoing headache.

## 2021-01-07 NOTE — PROGRESS NOTES
[facility] Admission Pharmacy Medication Reconciliation     Please see admission medication reconciliation note completed by [facility] Shirley campbell on 21. Per her note, patient was due for [place] injection on 21. PTA medication list below for reference. She was not receiving any of these medications during [facility] admission presumably due to admitting diagnosis of acetaminophen overdose. Prior to Admission Medications   Prescriptions Last Dose Informant Patient Reported? Taking?   benztropine (COGENTIN) 1 mg tablet  Self Yes No   Sig: Take 1 mg by mouth two (2) times daily as needed (soreness in muscles, stiffness). lithium carbonate 600 mg capsule  Self Yes No   Sig: Take 600 mg by mouth daily. lithium carbonate CR (ESKALITH CR) 450 mg CR tablet  Self Yes No   Sig: Take 450 mg by mouth daily. paliperidone palmitate (Invega Sustenna) 78 mg/0.5 mL injection  Self Yes No   Si mg by IntraMUSCular route every thirty (30) days. topiramate (TOPAMAX) 100 mg tablet  Self Yes No   Sig: Take 200 mg by mouth daily. topiramate (TOPAMAX) 100 mg tablet  Self Yes No   Sig: Take 100 mg by mouth nightly.       Facility-Administered Medications: None     Thank you,  Jocelyn Caballero, PHARMD, [facility], [address] [place] [address] Avenue [address]: [phone] ([phone])  Pharmacy: [phone] ([phone])

## 2021-01-07 NOTE — GROUP NOTE
UTE  GROUP DOCUMENTATION INDIVIDUAL Group Therapy Note Date: 1/7/2021 Group Start Time: 1000 Group End Time: 1100 Group Topic: Topic Group [facility] 3 ACUTE BEHAV [facility] [place], [address] GROUP DOCUMENTATION GROUP Group Therapy Note Attendees: 3 Attendance: Did not attend Patient's Goal: Interventions/techniques: 
Viral Freitas

## 2021-01-08 PROBLEM — F20.9 SCHIZOPHRENIA (HCC): Status: ACTIVE | Noted: 2021-01-08

## 2021-01-08 LAB
ERYTHROCYTE [DISTWIDTH] IN BLOOD BY AUTOMATED COUNT: 12.6 % (ref 11.5–14.5)
HCT VFR BLD AUTO: 34.2 % (ref 35–47)
HEALTH STATUS, XMCV2T: NORMAL
HGB BLD-MCNC: 11.3 G/DL (ref 11.5–16)
MCH RBC QN AUTO: 28 PG (ref 26–34)
MCHC RBC AUTO-ENTMCNC: 33 G/DL (ref 30–36.5)
MCV RBC AUTO: 84.7 FL (ref 80–99)
NRBC # BLD: 0 K/UL (ref 0–0.01)
NRBC BLD-RTO: 0 PER 100 WBC
PLATELET # BLD AUTO: 295 K/UL (ref 150–400)
PMV BLD AUTO: 9.9 FL (ref 8.9–12.9)
RBC # BLD AUTO: 4.04 M/UL (ref 3.8–5.2)
SARS-COV-2, COV2: NOT DETECTED
SOURCE, COVRS: NORMAL
SPECIMEN SOURCE, FCOV2M: NORMAL
SPECIMEN TYPE, XMCV1T: NORMAL
WBC # BLD AUTO: 4.9 K/UL (ref 3.6–11)

## 2021-01-08 PROCEDURE — 74011250637 HC RX REV CODE- 250/637: Performed by: PSYCHIATRY & NEUROLOGY

## 2021-01-08 PROCEDURE — 74011250637 HC RX REV CODE- 250/637: Performed by: NURSE PRACTITIONER

## 2021-01-08 PROCEDURE — 65220000003 HC RM SEMIPRIVATE PSYCH

## 2021-01-08 PROCEDURE — 36415 COLL VENOUS BLD VENIPUNCTURE: CPT

## 2021-01-08 PROCEDURE — 85027 COMPLETE CBC AUTOMATED: CPT

## 2021-01-08 RX ORDER — TOPIRAMATE 100 MG/1
100 TABLET, FILM COATED ORAL
Status: DISCONTINUED | OUTPATIENT
Start: 2021-01-08 | End: 2021-01-11 | Stop reason: HOSPADM

## 2021-01-08 RX ORDER — LAMOTRIGINE 25 MG/1
25 TABLET ORAL DAILY
Status: DISCONTINUED | OUTPATIENT
Start: 2021-01-08 | End: 2021-01-11 | Stop reason: HOSPADM

## 2021-01-08 RX ORDER — TOPIRAMATE 100 MG/1
200 TABLET, FILM COATED ORAL DAILY
Status: DISCONTINUED | OUTPATIENT
Start: 2021-01-09 | End: 2021-01-11 | Stop reason: HOSPADM

## 2021-01-08 RX ORDER — ONDANSETRON 4 MG/1
4 TABLET, ORALLY DISINTEGRATING ORAL
Status: DISCONTINUED | OUTPATIENT
Start: 2021-01-08 | End: 2021-01-11 | Stop reason: HOSPADM

## 2021-01-08 RX ORDER — ONDANSETRON 2 MG/ML
4 INJECTION INTRAMUSCULAR; INTRAVENOUS
Status: DISCONTINUED | OUTPATIENT
Start: 2021-01-08 | End: 2021-01-11 | Stop reason: HOSPADM

## 2021-01-08 RX ADMIN — LAMOTRIGINE 25 MG: 25 TABLET ORAL at 14:29

## 2021-01-08 RX ADMIN — TRAZODONE HYDROCHLORIDE 50 MG: 50 TABLET ORAL at 21:35

## 2021-01-08 RX ADMIN — ONDANSETRON 4 MG: 4 TABLET, ORALLY DISINTEGRATING ORAL at 03:24

## 2021-01-08 RX ADMIN — MAGNESIUM HYDROXIDE 30 ML: 2400 SUSPENSION ORAL at 21:35

## 2021-01-08 RX ADMIN — ONDANSETRON 4 MG: 4 TABLET, ORALLY DISINTEGRATING ORAL at 15:59

## 2021-01-08 RX ADMIN — ONDANSETRON 4 MG: 4 TABLET, ORALLY DISINTEGRATING ORAL at 21:35

## 2021-01-08 RX ADMIN — ALUMINUM HYDROXIDE AND MAGNESIUM HYDROXIDE 30 ML: 200; 200 SUSPENSION ORAL at 11:07

## 2021-01-08 RX ADMIN — TOPIRAMATE 100 MG: 100 TABLET, FILM COATED ORAL at 21:34

## 2021-01-08 RX ADMIN — ACETAMINOPHEN 650 MG: 325 TABLET, FILM COATED ORAL at 00:16

## 2021-01-08 NOTE — GROUP NOTE
UTE  GROUP DOCUMENTATION INDIVIDUAL Group Therapy Note Date: 1/8/2021 Group Start Time: 1100 Group End Time: 1200 Group Topic: Topic Group [facility] ACUTE BEHAV [facility] [place], [address] GROUP DOCUMENTATION GROUP Group Therapy Note Attendees: 4 Attendance: Attended Patient's Goal:  To learn about resilience Interventions/techniques: Supported-handout Follows Directions: Followed directions Interactions: Interacted appropriately Mental Status: Calm Behavior/appearance: Attentive, Cooperative and Needed prompting Goals Achieved: Able to engage in interactions, Able to listen to others, Able to self-disclose, Discussed coping and Discussed self-esteem issues Additional Notes:   
 
Isrrael Hull

## 2021-01-08 NOTE — GROUP NOTE
UTE  GROUP DOCUMENTATION INDIVIDUAL Group Therapy Note Date: 1/8/2021 Group Start Time: 1500 Group End Time: 3212 Group Topic: Recreational/Music Therapy [facility] 3 ACUTE BEHAV [facility] [place], [address] GROUP DOCUMENTATION GROUP Group Therapy Note Attendees: 4 Attendance: Attended Patient's Goal:  To concentrate on selected task Interventions/techniques: Supported-crafts,games,music Follows Directions: Followed directions Interactions: Interacted appropriately Mental Status: Calm Behavior/appearance: Attentive, Cooperative and Needed prompting Goals Achieved: Able to engage in interactions and Able to listen to others Additional Notes:   
 
Frantz Milan

## 2021-01-08 NOTE — PROGRESS NOTES
Problem: Suicide  Goal: *STG: Remains safe in hospital  Outcome: Progressing Towards Goal  Goal: *STG/LTG: Complies with medication therapy  Outcome: Progressing Towards Goal  Goal: *STG/LTG: No longer expresses self destructive or suicidal thoughts  Outcome: Resolved/Met  Patient denies SI

## 2021-01-08 NOTE — PROGRESS NOTES
Problem: Falls - Risk of  Goal: *Absence of Falls  Description: Document Bard  Fall Risk and appropriate interventions in the flowsheet. Outcome: Progressing Towards Goal  Note: Fall Risk Interventions:    Problem: Suicide  Goal: *STG: Remains safe in hospital  Outcome: Progressing Towards Goal     1930- Report received from dayshift RN, assumed care of pt.    2000- Pt received in room, sleeping. Pt cooperative with staff, presents with calm mood. Pt denies SI/HI and A/V/H. Pt denies anxiety and depression. Pt denies pain at this time. 0010- Pt c/o headache 7/10, prn tylenol given, which was effective. 0330- Pt c/o nausea and vomiting up \"bubbles. \" prn Zofran given, which was effective.      Sleep hour; 11.5 hours

## 2021-01-08 NOTE — PROGRESS NOTES
Laboratory monitoring for mood stabilizer and antipsychotics:    Recommended baseline monitoring has not been completed based on this patient's current medication regimen. The following labs have been ordered to complete baseline monitoring: TSH, lipid panel, hemoglobin A1c     The patient is currently taking the following medication(s):   Current Facility-Administered Medications   Medication Dose Route Frequency    lamoTRIgine (LaMICtal) tablet 25 mg  25 mg Oral DAILY    [START ON 1/9/2021] topiramate (TOPAMAX) tablet 200 mg  200 mg Oral DAILY    topiramate (TOPAMAX) tablet 100 mg  100 mg Oral QHS    paliperidone palmitate (INVEGA SUSTENNA) injection 156 mg  156 mg IntraMUSCular EVERY MONTH       Serum Drug Level(s)  LITHIUM  Lab Results   Component Value Date/Time    Lithium level <0.20 (L) 01/05/2021 06:56 PM       Height, Weight, BMI Estimation  Estimated body mass index is 49.2 kg/m² as calculated from the following:    Height as of this encounter: 157.5 cm (62\"). Weight as of this encounter: 122 kg (269 lb). Renal Function, Hepatic Function and Chemistry  Estimated Creatinine Clearance: 139.7 mL/min (A) (by C-G formula based on SCr of 0.52 mg/dL (L)). Lab Results   Component Value Date/Time    Sodium 138 01/07/2021 03:15 AM    Potassium 3.4 (L) 01/07/2021 03:15 AM    Chloride 109 (H) 01/07/2021 03:15 AM    CO2 24 01/07/2021 03:15 AM    Anion gap 5 01/07/2021 03:15 AM    Glucose 108 (H) 01/07/2021 03:15 AM    BUN 12 01/07/2021 03:15 AM    Creatinine 0.52 (L) 01/07/2021 03:15 AM    BUN/Creatinine ratio 23 (H) 01/07/2021 03:15 AM    GFR est AA >60 01/07/2021 03:15 AM    GFR est non-AA >60 01/07/2021 03:15 AM    Calcium 8.8 01/07/2021 03:15 AM    ALT (SGPT) 15 01/07/2021 03:15 AM    Alk.  phosphatase 48 01/07/2021 03:15 AM    Protein, total 6.4 01/07/2021 03:15 AM    Albumin 2.9 (L) 01/07/2021 03:15 AM    Globulin 3.5 01/07/2021 03:15 AM    A-G Ratio 0.8 (L) 01/07/2021 03:15 AM    Bilirubin, total 0.5 01/07/2021 03:15 AM       Lab Results   Component Value Date/Time    Glucose 108 (H) 01/07/2021 03:15 AM    Glucose (POC) 105 (H) 01/05/2021 10:42 AM       Hematology  Lab Results   Component Value Date/Time    WBC 4.9 01/08/2021 05:03 AM    HGB 11.3 (L) 01/08/2021 05:03 AM    HCT 34.2 (L) 01/08/2021 05:03 AM    PLATELET 652 16/50/2686 05:03 AM    MCV 84.7 01/08/2021 05:03 AM    Hgb, External 12.9 g/dL 07/10/2013 07:36 AM    Hct, External 37.3 % 07/10/2013 07:36 AM    Platelet cnt., External 326 x10E3/uL 07/10/2013 07:36 AM       Lipids  Lab Results   Component Value Date/Time    Cholesterol, total 167 03/04/2015 01:22 PM    HDL Cholesterol 46 03/04/2015 01:22 PM    LDL, calculated 96 03/04/2015 01:22 PM    Triglyceride 126 03/04/2015 01:22 PM       Thyroid Function    Lab Results   Component Value Date/Time    TSH 2.450 03/04/2015 01:22 PM    TSH, External 3.61 02/11/2013 07:36 AM     Vitals  Visit Vitals  /77 (BP 1 Location: Left arm, BP Patient Position: Sitting)   Pulse 77   Temp 98.4 °F (36.9 °C)   Resp 16   Ht 157.5 cm (62\")   Wt 122 kg (269 lb)   SpO2 99%   Breastfeeding No   BMI 49.20 kg/m²       Pregnancy Test  Lab Results   Component Value Date/Time    HCG urine, QL Negative 01/07/2021 03:15 AM    Pregnancy test,urine (POC) Negative 01/05/2021 10:58 AM    HCG urine, Ql. (POC) Negative 03/10/2014 12:37 PM       Juliana Acosta, PharmD, BCPS  [phone] (pharmacy)

## 2021-01-08 NOTE — BH NOTES
PSYCHOSOCIAL ASSESSMENT  :Patient identifying info:  Codie Rogers is a 28 y.o., female admitted 1/7/2021  1:55 PM     Presenting problem and precipitating factors: patient brought to ED for overdose on Tylenol- children called EMS because she was threatening to hurt herself. Patient's  reports increased paranoia. Patient reports that her doctor left [facility]) and the two doctors who took over changed all her medicine. She reports she took the Tylenol because of knee pain. She is unsure of medications or purpose but states that she needs them so that she is stable. Mental status assessment: patient presented with appropriate eye contact, cooperative attitude, congruent mood, full affect, fluid speech, goal-directed thought stream, appropriate content    Strengths: service-connected, supportive family    Collateral information: verbal consent provided for Aurelio Plmargarette- [phone]    Current psychiatric /substance abuse providers and contact info: Patient reports she has services off of [facility]. By the [employer]. When asked if she goes to [facility], she confirmed. She said she works with [employer]. Previous psychiatric/substance abuse providers and response to treatment: patient was admitted to [facility] 2 years ago. Family history of mental illness or substance abuse: none reported    Substance abuse history:  UDS-, BAL<10; patient denies  Social History     Tobacco Use    Smoking status: Never Smoker    Smokeless tobacco: Never Used   Substance Use Topics    Alcohol use: No       History of biomedical complications associated with substance abuse : none reported    Patient's current acceptance of treatment or motivation for change: voluntary    Family constellation: patient is  and has 3 children    Is significant other involved?  yes      Describe support system: patient's primary support is     Describe living arrangements and home environment: patient lives with her  and children    Health issues: back pain, acid reflux  Hospital Problems  Date Reviewed: 2017    None          Trauma history: none reported    Legal issues: none reported    History of  service: none reported    Financial status: disabled    Episcopal/cultural factors: [place]    Education/work history: none reported    Have you been licensed as a health care professional (current or ): no    Leisure and recreation preferences: none reported    Describe coping skills: limited and ineffectual    Stevan Lopez  2021

## 2021-01-08 NOTE — GROUP NOTE
UTE  GROUP DOCUMENTATION INDIVIDUAL Group Therapy Note Date: 1/8/2021 Group Start Time: 0900 Group End Time: 1000 Group Topic: Topic Group [address] ACUTE BEHAV [facility], [address] GROUP DOCUMENTATION GROUP Group Therapy Note Attendees: 3 Attendance: Attended Patient's Goal:  To identify positive and negative coping skills Interventions/techniques: Supported-life scenarios Follows Directions: Followed directions Interactions: Interacted appropriately Mental Status: calm Behavior/appearance: Attentive, Cooperative and Needed prompting Goals Achieved: Able to engage in interactions, Able to listen to others, Able to self-disclose and Discussed coping Additional Notes:   
 
Kiarra Villa

## 2021-01-08 NOTE — BH NOTES
GROUP THERAPY PROGRESS NOTE    Patient is participating in Discharge Group. Group time: 30 minutes    Personal goal for participation: Process feelings related to discharge and/or feelings/goals for today. Goal orientation: Personal    Group therapy participation: active    Therapeutic interventions reviewed and discussed: Group discussion was focused on discharge plans and anxiety related to this. Group members discussed what they planned to do once discharge and discharge plans. Patients discussed their goals for today as well as the weekend and what they are working on with treatment team to get ready for discharge. Impression of participation: Stephanie Miranda was present in group today. She shared that she came to get help because she had been overwhelmed. She reports she does not have a goal for today and would think about setting goals for the day. She listened attentively to discussion and others sharing. She denies any current issues stating she feels much better than when she was admitted to the hospital a few days ago.     Lauren Keane LPC LSATP [facility]

## 2021-01-09 LAB
CHOLEST SERPL-MCNC: 178 MG/DL
EST. AVERAGE GLUCOSE BLD GHB EST-MCNC: 100 MG/DL
HBA1C MFR BLD: 5.1 % (ref 4–5.6)
HDLC SERPL-MCNC: 47 MG/DL
HDLC SERPL: 3.8 {RATIO} (ref 0–5)
LDLC SERPL CALC-MCNC: 99.8 MG/DL (ref 0–100)
LIPID PROFILE,FLP: ABNORMAL
TRIGL SERPL-MCNC: 156 MG/DL (ref ?–150)
TSH SERPL DL<=0.05 MIU/L-ACNC: 3.49 UIU/ML (ref 0.36–3.74)
VLDLC SERPL CALC-MCNC: 31.2 MG/DL

## 2021-01-09 PROCEDURE — 80061 LIPID PANEL: CPT

## 2021-01-09 PROCEDURE — 36415 COLL VENOUS BLD VENIPUNCTURE: CPT

## 2021-01-09 PROCEDURE — 83036 HEMOGLOBIN GLYCOSYLATED A1C: CPT

## 2021-01-09 PROCEDURE — 65220000003 HC RM SEMIPRIVATE PSYCH

## 2021-01-09 PROCEDURE — 74011250637 HC RX REV CODE- 250/637: Performed by: NURSE PRACTITIONER

## 2021-01-09 PROCEDURE — 84443 ASSAY THYROID STIM HORMONE: CPT

## 2021-01-09 PROCEDURE — 74011250637 HC RX REV CODE- 250/637: Performed by: PSYCHIATRY & NEUROLOGY

## 2021-01-09 RX ADMIN — ACETAMINOPHEN 650 MG: 325 TABLET, FILM COATED ORAL at 21:12

## 2021-01-09 RX ADMIN — TRAZODONE HYDROCHLORIDE 50 MG: 50 TABLET ORAL at 21:11

## 2021-01-09 RX ADMIN — ACETAMINOPHEN 650 MG: 325 TABLET, FILM COATED ORAL at 00:32

## 2021-01-09 RX ADMIN — LAMOTRIGINE 25 MG: 25 TABLET ORAL at 08:16

## 2021-01-09 RX ADMIN — TOPIRAMATE 100 MG: 100 TABLET, FILM COATED ORAL at 21:12

## 2021-01-09 RX ADMIN — ALUMINUM HYDROXIDE AND MAGNESIUM HYDROXIDE 30 ML: 200; 200 SUSPENSION ORAL at 16:51

## 2021-01-09 RX ADMIN — TOPIRAMATE 200 MG: 100 TABLET, FILM COATED ORAL at 08:16

## 2021-01-09 RX ADMIN — ACETAMINOPHEN 650 MG: 325 TABLET, FILM COATED ORAL at 08:16

## 2021-01-09 NOTE — PROGRESS NOTES
Diet as tolerated. Pt meal compliant. Hx notable for HTN, MO.     Ht: 5'2\"  Wt: 269 lb  BMI: 49.20 kg/(m^2) c/w obesity grade III (morbid)  Est energy needs: 1785 kcal, 70 g protein, 2500 mL fluids  Pt will consume > 75% of meals at follow up 7-10 days  LOS, MST

## 2021-01-09 NOTE — PROGRESS NOTES
Patient alert, cooperative, compliant with vital signs and medication. Patient denies anxiety, denies depression, denies SI, HI, AH, VH. Patient with c/o nausea and acid reflux. Patient given zofran and maalox prn with good effect. Patient to dayroom for meals and attended groups. Patient talked with  on telephone x 2. Patient showered and personal clothing items returned.

## 2021-01-09 NOTE — BH NOTES
GROUP THERAPY PROGRESS NOTE    Patient is participating in a coping skills group. Group time: 1 hour    Personal goal for participation: Discuss healthy vs unhealthy coping strategies to combat mental health challenges and symptoms    Goal orientation: Personal    Group therapy participation: active    Therapeutic interventions reviewed and discussed: Patients provided psychoeducation on healthy vs unhealthy coping mechanisms. Patients discussed the problems they are currently dealing with, identified unhealthy coping strategies they use, and discussed the consequences of these unhealthy strategies. Patients then identified and discussed how to incorporate new, healthy coping activities into daily routine and when symptoms of mental health increase. Patients identified barriers to using these healthy coping strategies and how to overcome these barriers. Impression of participation: Pt presented with depressed mood, often tearful at times. Pt had coherent speech, calm and cooperative with staff. Pt endorsed feeling hopeless, stating that after 8 years of treatment nothing has improved her mental health needs. Pt identified over eating and self-harm as negative coping skills. Pt able to identify why these coping skills are harmful. Pt identified spending time with family, walking, shopping and seeking outpatient support as positive coping skills.      WALTER Harrison

## 2021-01-09 NOTE — PROGRESS NOTES
Problem: Falls - Risk of  Goal: *Absence of Falls  Description: Document Gabbi Led Fall Risk and appropriate interventions in the flowsheet. Outcome: Progressing Towards Goal  Note: Fall Risk Interventions:      Problem: Suicide  Goal: *STG: Remains safe in hospital  Outcome: Progressing Towards Goal     Problem: Suicide  Goal: *STG: Attends activities and groups  Outcome: Progressing Towards Goal         1930: Pt received in his room. Pt was laying in bed during shift assessment. Pt denies si/hi/ah/vh. Pt reports  going outside to the day room for meals and has been participating in groups. Anxiety=8/10                 depression= 0/10. Pt is Aox4. Med and meal compliant. Pt stated her long term goals are to go home again soon and to be with her family. Pt reported headache pain 5/10 and stomach pain of 8/10. Pt reported that it has been about 2-3 days since her last BM and is slightly constipated. 2135: milk of mag given for constipation    0032: 650 mg tylenol given for headache. Pt rates headache pain 10/10    0530: pain reassessed, pt reported headache pain of 0/10. Medication is effective.     Sleep- 8.5 hrs

## 2021-01-09 NOTE — PROGRESS NOTES
Laboratory monitoring for mood stabilizer and antipsychotics:    Recommended baseline monitoring has been completed based on this patient's current medication regimen. The patient is currently taking the following medication(s):   Current Facility-Administered Medications   Medication Dose Route Frequency    lamoTRIgine (LaMICtal) tablet 25 mg  25 mg Oral DAILY    topiramate (TOPAMAX) tablet 200 mg  200 mg Oral DAILY    topiramate (TOPAMAX) tablet 100 mg  100 mg Oral QHS    paliperidone palmitate (INVEGA SUSTENNA) injection 156 mg  156 mg IntraMUSCular EVERY MONTH       LITHIUM  Lab Results   Component Value Date/Time    Lithium level <0.20 (L) 01/05/2021 06:56 PM       Height, Weight, BMI Estimation  Estimated body mass index is 49.2 kg/m² as calculated from the following:    Height as of this encounter: 157.5 cm (62\"). Weight as of this encounter: 122 kg (269 lb). Renal Function, Hepatic Function and Chemistry  Estimated Creatinine Clearance: 139.7 mL/min (A) (by C-G formula based on SCr of 0.52 mg/dL (L)). Lab Results   Component Value Date/Time    Sodium 138 01/07/2021 03:15 AM    Potassium 3.4 (L) 01/07/2021 03:15 AM    Chloride 109 (H) 01/07/2021 03:15 AM    CO2 24 01/07/2021 03:15 AM    Anion gap 5 01/07/2021 03:15 AM    Glucose 108 (H) 01/07/2021 03:15 AM    BUN 12 01/07/2021 03:15 AM    Creatinine 0.52 (L) 01/07/2021 03:15 AM    BUN/Creatinine ratio 23 (H) 01/07/2021 03:15 AM    GFR est AA >60 01/07/2021 03:15 AM    GFR est non-AA >60 01/07/2021 03:15 AM    Calcium 8.8 01/07/2021 03:15 AM    ALT (SGPT) 15 01/07/2021 03:15 AM    Alk.  phosphatase 48 01/07/2021 03:15 AM    Protein, total 6.4 01/07/2021 03:15 AM    Albumin 2.9 (L) 01/07/2021 03:15 AM    Globulin 3.5 01/07/2021 03:15 AM    A-G Ratio 0.8 (L) 01/07/2021 03:15 AM    Bilirubin, total 0.5 01/07/2021 03:15 AM       Lab Results   Component Value Date/Time    Glucose 108 (H) 01/07/2021 03:15 AM    Glucose (POC) 105 (H) 01/05/2021 10:42 AM Lab Results   Component Value Date/Time    Hemoglobin A1c 5.1 01/09/2021 05:22 AM       Hematology  Lab Results   Component Value Date/Time    WBC 4.9 01/08/2021 05:03 AM    HGB 11.3 (L) 01/08/2021 05:03 AM    HCT 34.2 (L) 01/08/2021 05:03 AM    PLATELET 071 43/73/3802 05:03 AM    MCV 84.7 01/08/2021 05:03 AM    Hgb, External 12.9 g/dL 07/10/2013 07:36 AM    Hct, External 37.3 % 07/10/2013 07:36 AM    Platelet cnt., External 326 x10E3/uL 07/10/2013 07:36 AM       Lipids  Lab Results   Component Value Date/Time    Cholesterol, total 178 01/09/2021 05:22 AM    HDL Cholesterol 47 01/09/2021 05:22 AM    LDL, calculated 99.8 01/09/2021 05:22 AM    Triglyceride 156 (H) 01/09/2021 05:22 AM    CHOL/HDL Ratio 3.8 01/09/2021 05:22 AM       Thyroid Function    Lab Results   Component Value Date/Time    TSH 3.49 01/09/2021 05:22 AM    TSH, External 3.61 02/11/2013 07:36 AM     Vitals  Visit Vitals  /79 (BP 1 Location: Right arm, BP Patient Position: Sitting)   Pulse 82   Temp 97.4 °F (36.3 °C)   Resp 16   Ht 157.5 cm (62\")   Wt 122 kg (269 lb)   SpO2 99%   Breastfeeding No   BMI 49.20 kg/m²       Pregnancy Test  Lab Results   Component Value Date/Time    HCG urine, QL Negative 01/07/2021 03:15 AM    Pregnancy test,urine (POC) Negative 01/05/2021 10:58 AM    HCG urine, Ql. (POC) Negative 03/10/2014 12:37 PM       Patsy Pinto [phone] (pharmacy)

## 2021-01-09 NOTE — PROGRESS NOTES
Problem: Suicide  Goal: *STG: Remains safe in hospital  Outcome: Progressing Towards Goal  Goal: Interventions  Outcome: Progressing Towards Goal     0718 This  writer received report from the outgoing nurse.

## 2021-01-09 NOTE — BH NOTES
Psychiatric Progress Note    Patient: Beck Rodriguez MRN: [id number]  SSN: [id number]    YOB: 1985  Age: 28 y.o. Sex: female      Admit Date: 1/7/2021    LOS: 2 days     Subjective:     Beck Rodriguez  reports feeling and doing better and moods are good today. Denies SI/HI/AH/VH. No aggression or violence. Appropriately interactive and aware. Tolerating medications well. Eating well and sleeping well.     Objective:     Vitals:    01/07/21 2000 01/08/21 0746 01/08/21 2002 01/09/21 0812   BP: 97/62 123/77 (!) 139/91 109/79   Pulse: 80 77 87 82   Resp: 16 16 14 16   Temp: 97.5 °F (36.4 °C) 98.4 °F (36.9 °C) 98.5 °F (36.9 °C) 97.4 °F (36.3 °C)   SpO2: 100% 99% 100% 99%   Weight:       Height:            Mental Status Exam:   Sensorium  oriented to time, place and person   Relations cooperative   Eye Contact appropriate   Appearance:  age appropriate   Speech:  normal volume and non-pressured   Thought Process: goal directed   Thought Content free of delusions and free of hallucinations   Suicidal ideations none   Mood:  depressed   Affect:  Fair range   Memory   adequate   Concentration:  adequate   Insight:  limited   Judgment:  fair       MEDICATIONS:  Current Facility-Administered Medications   Medication Dose Route Frequency    ondansetron (ZOFRAN ODT) tablet 4 mg  4 mg Oral Q6H PRN    aluminum-magnesium hydroxide (MAALOX) oral suspension 30 mL  30 mL Oral QID PRN    ondansetron (ZOFRAN) injection 4 mg  4 mg IntraMUSCular Q8H PRN    lamoTRIgine (LaMICtal) tablet 25 mg  25 mg Oral DAILY    topiramate (TOPAMAX) tablet 200 mg  200 mg Oral DAILY    topiramate (TOPAMAX) tablet 100 mg  100 mg Oral QHS    paliperidone palmitate (INVEGA SUSTENNA) injection 156 mg  156 mg IntraMUSCular EVERY MONTH    OLANZapine (ZyPREXA) tablet 5 mg  5 mg Oral Q6H PRN    haloperidol lactate (HALDOL) injection 5 mg  5 mg IntraMUSCular Q6H PRN    benztropine (COGENTIN) tablet 1 mg  1 mg Oral BID PRN    diphenhydrAMINE (BENADRYL) injection 50 mg  50 mg IntraMUSCular BID PRN    hydrOXYzine HCL (ATARAX) tablet 50 mg  50 mg Oral TID PRN    LORazepam (ATIVAN) injection 1 mg  1 mg IntraMUSCular Q4H PRN    traZODone (DESYREL) tablet 50 mg  50 mg Oral QHS PRN    acetaminophen (TYLENOL) tablet 650 mg  650 mg Oral Q4H PRN    magnesium hydroxide (MILK OF MAGNESIA) 400 mg/5 mL oral suspension 30 mL  30 mL Oral DAILY PRN      DISCUSSION:   the risks and benefits of the proposed medication  patient given opportunity to ask questions    Lab/Data Review: All lab results for the last 24 hours reviewed.      Recent Results (from the past 24 hour(s))   LIPID PANEL    Collection Time: 01/09/21  5:22 AM   Result Value Ref Range    LIPID PROFILE          Cholesterol, total 178 <200 MG/DL    Triglyceride 156 (H) <150 MG/DL    HDL Cholesterol 47 MG/DL    LDL, calculated 99.8 0 - 100 MG/DL    VLDL, calculated 31.2 MG/DL    CHOL/HDL Ratio 3.8 0.0 - 5.0     TSH 3RD GENERATION    Collection Time: 01/09/21  5:22 AM   Result Value Ref Range    TSH 3.49 0.36 - 3.74 uIU/mL   HEMOGLOBIN A1C WITH EAG    Collection Time: 01/09/21  5:22 AM   Result Value Ref Range    Hemoglobin A1c 5.1 4.0 - 5.6 %    Est. average glucose 100 mg/dL         Assessment:     Principal Problem:    Schizophrenia ([facility] [address] (1/8/2021)        Plan:     Continue current care  Collateral information  Disposition planning with social work for the next few days    Signed By: Jing Anderson MD     January 9, 2021

## 2021-01-10 PROCEDURE — 74011250637 HC RX REV CODE- 250/637: Performed by: NURSE PRACTITIONER

## 2021-01-10 PROCEDURE — 74011250637 HC RX REV CODE- 250/637: Performed by: PSYCHIATRY & NEUROLOGY

## 2021-01-10 PROCEDURE — 65220000003 HC RM SEMIPRIVATE PSYCH

## 2021-01-10 RX ADMIN — ACETAMINOPHEN 650 MG: 325 TABLET, FILM COATED ORAL at 15:15

## 2021-01-10 RX ADMIN — LAMOTRIGINE 25 MG: 25 TABLET ORAL at 08:48

## 2021-01-10 RX ADMIN — HYDROXYZINE HYDROCHLORIDE 50 MG: 25 TABLET, FILM COATED ORAL at 21:14

## 2021-01-10 RX ADMIN — TOPIRAMATE 200 MG: 100 TABLET, FILM COATED ORAL at 08:48

## 2021-01-10 RX ADMIN — ALUMINUM HYDROXIDE AND MAGNESIUM HYDROXIDE 30 ML: 200; 200 SUSPENSION ORAL at 21:14

## 2021-01-10 RX ADMIN — TOPIRAMATE 100 MG: 100 TABLET, FILM COATED ORAL at 21:17

## 2021-01-10 RX ADMIN — MAGNESIUM HYDROXIDE 30 ML: 2400 SUSPENSION ORAL at 15:15

## 2021-01-10 RX ADMIN — TRAZODONE HYDROCHLORIDE 50 MG: 50 TABLET ORAL at 21:14

## 2021-01-10 NOTE — BH NOTES
Psychiatric Progress Note    Patient: Lynnette Singh MRN: [id number]  SSN: [id number]    YOB: 1985  Age: 28 y.o. Sex: female      Admit Date: 1/7/2021    LOS: 3 days     Subjective:     Fadia Cooper  reports feeling and doing better and moods are good today. Denies SI/HI/AH/VH. No aggression or violence. Appropriately interactive and aware. Tolerating medications well. Eating well and sleeping well. 1/10 - Fadia Cooper fair, but discussed a change in her smell and tastes at home sometimes. States the water tastes different her but not at home but has had this sensation before. Reports feeling well otherwise. Denies SI/HI/AH/VH. No aggression or violence. Appropriately interactive and aware. Tolerating medications well. Asked if an increase in her meds would be appropriate. Eating and sleeping fairly.       Objective:     Vitals:    01/08/21 2002 01/09/21 0812 01/09/21 1950 01/10/21 0814   BP: (!) 139/91 109/79 133/88 105/70   Pulse: 87 82 82 79   Resp: 14 16 19 18   Temp: 98.5 °F (36.9 °C) 97.4 °F (36.3 °C) 97.3 °F (36.3 °C) 98.1 °F (36.7 °C)   SpO2: 100% 99% 96% 100%   Weight:       Height:            Mental Status Exam:   Sensorium  oriented to time, place and person   Relations cooperative   Eye Contact appropriate   Appearance:  age appropriate   Speech:  normal volume and non-pressured   Thought Process: goal directed   Thought Content free of delusions and free of hallucinations   Suicidal ideations none   Mood:  depressed   Affect:  Fair range   Memory   adequate   Concentration:  adequate   Insight:  limited   Judgment:  fair       MEDICATIONS:  Current Facility-Administered Medications   Medication Dose Route Frequency    ondansetron (ZOFRAN ODT) tablet 4 mg  4 mg Oral Q6H PRN    aluminum-magnesium hydroxide (MAALOX) oral suspension 30 mL  30 mL Oral QID PRN    ondansetron (ZOFRAN) injection 4 mg  4 mg IntraMUSCular Q8H PRN    lamoTRIgine (LaMICtal) tablet 25 mg  25 mg Oral DAILY    topiramate (TOPAMAX) tablet 200 mg  200 mg Oral DAILY    topiramate (TOPAMAX) tablet 100 mg  100 mg Oral QHS    paliperidone palmitate (INVEGA SUSTENNA) injection 156 mg  156 mg IntraMUSCular EVERY MONTH    OLANZapine (ZyPREXA) tablet 5 mg  5 mg Oral Q6H PRN    haloperidol lactate (HALDOL) injection 5 mg  5 mg IntraMUSCular Q6H PRN    benztropine (COGENTIN) tablet 1 mg  1 mg Oral BID PRN    diphenhydrAMINE (BENADRYL) injection 50 mg  50 mg IntraMUSCular BID PRN    hydrOXYzine HCL (ATARAX) tablet 50 mg  50 mg Oral TID PRN    LORazepam (ATIVAN) injection 1 mg  1 mg IntraMUSCular Q4H PRN    traZODone (DESYREL) tablet 50 mg  50 mg Oral QHS PRN    acetaminophen (TYLENOL) tablet 650 mg  650 mg Oral Q4H PRN    magnesium hydroxide (MILK OF MAGNESIA) 400 mg/5 mL oral suspension 30 mL  30 mL Oral DAILY PRN      DISCUSSION:   the risks and benefits of the proposed medication  patient given opportunity to ask questions    Lab/Data Review: All lab results for the last 24 hours reviewed. No results found for this or any previous visit (from the past 24 hour(s)).       Assessment:     Principal Problem:    Schizophrenia ([address] (1/8/2021)        Plan:     Continue current care  Collateral information  Disposition planning with social work for the next few days    Signed By: Suleman Chavarria MD     January 10, 2021

## 2021-01-10 NOTE — PROGRESS NOTES
Problem: Falls - Risk of  Goal: *Absence of Falls  Description: Document Chloe Ruiz Fall Risk and appropriate interventions in the flowsheet.   Outcome: Progressing Towards Goal  Note: Fall Risk Interventions:            Medication Interventions: Teach patient to arise slowly                   Problem: Patient Education: Go to Patient Education Activity  Goal: Patient/Family Education  Outcome: Progressing Towards Goal     Problem: Suicide  Goal: *STG: Remains safe in hospital  Outcome: Progressing Towards Goal  Goal: *STG: Seeks staff when feelings of self harm or harm towards others arise  Outcome: Progressing Towards Goal  Goal: *STG:  Verbalizes alternative ways of dealing with maladaptive feelings/behaviors  Outcome: Progressing Towards Goal  Goal: *STG/LTG: Complies with medication therapy  Outcome: Progressing Towards Goal

## 2021-01-10 NOTE — BH NOTES
0700- Verbal shift change report given to Megan Ramon  (oncoming nurse) by Mable Plata (offgoing nurse). Report included the following information SBAR, Kardex, MAR and Recent Results. [id number] Patient denies SI/HI/AVH. Patient denies any anxiety or depression. Patient smiling upon assessment. Patient stated, \"I am ready to go home. \" Patient stated that she slept well. Patient discussed olfactory symptoms that seem to present similarly to hyperosmia. Patient stated that for months she has smelled \"a bad smell\". Patient stated she used to get into arguments with her  about it. Patient stated that smell is frequently present. [phone] Patient transferred from acute to general side related to appropriate circumstances. Patient moved without issue. No further issues noted at this time. [id number] Patient tolerated lunch without issue. [id number]  Patient in day room interacting appropriately with peers. [id number] Patient tolerated dinner without issue. Patient continues to be in and out of day room at this time.

## 2021-01-10 NOTE — PROGRESS NOTES
Problem: Suicide  Goal: *STG: Remains safe in hospital  Outcome: Progressing Towards Goal     Problem: Falls - Risk of  Goal: *Absence of Falls  Description: Document Debby Fall Risk and appropriate interventions in the flowsheet. Outcome: Progressing Towards Goal  Note: Fall Risk Interventions:          1930 Pt received in her room. Pt was laying in bed during shift assessment. Pt denies si/hi/ah/vh. Pt reports going to groups and eating her meals outside in the day room. Anxiety=3/10 depression= 2/10. Pt is Aox4. Pt reported headache pain of 3/10/ She also reported a side effect of change her in smell. She stated that this symptom started a few weeks ago while she was at home with her family. Med and meal compliant. Pt stated her long term goal is to go home and feel better. 2112 650 mg tylenol given for headache  2111 50 mg trazadone given for insomnia    2300 pt appears to be calm and cooperative. Medication appears to be effective.       total sleep - 9.5 hours

## 2021-01-11 VITALS
HEIGHT: 62 IN | OXYGEN SATURATION: 98 % | TEMPERATURE: 97.8 F | HEART RATE: 95 BPM | BODY MASS INDEX: 49.5 KG/M2 | RESPIRATION RATE: 16 BRPM | DIASTOLIC BLOOD PRESSURE: 80 MMHG | WEIGHT: 269 LBS | SYSTOLIC BLOOD PRESSURE: 128 MMHG

## 2021-01-11 PROCEDURE — 74011250637 HC RX REV CODE- 250/637: Performed by: PSYCHIATRY & NEUROLOGY

## 2021-01-11 RX ORDER — TOPIRAMATE 200 MG/1
200 TABLET ORAL DAILY
Qty: 30 TAB | Refills: 0 | Status: SHIPPED | OUTPATIENT
Start: 2021-01-12 | End: 2022-03-04 | Stop reason: ALTCHOICE

## 2021-01-11 RX ORDER — TRAZODONE HYDROCHLORIDE 50 MG/1
50 TABLET ORAL
Qty: 30 TAB | Refills: 0 | Status: SHIPPED | OUTPATIENT
Start: 2021-01-11 | End: 2022-03-04

## 2021-01-11 RX ORDER — BENZTROPINE MESYLATE 1 MG/1
1 TABLET ORAL
Qty: 60 TAB | Refills: 0 | Status: SHIPPED | OUTPATIENT
Start: 2021-01-11 | End: 2022-03-04 | Stop reason: ALTCHOICE

## 2021-01-11 RX ORDER — LAMOTRIGINE 25 MG/1
25 TABLET ORAL DAILY
Qty: 45 TAB | Refills: 0 | Status: SHIPPED | OUTPATIENT
Start: 2021-01-12 | End: 2022-03-04 | Stop reason: ALTCHOICE

## 2021-01-11 RX ORDER — TOPIRAMATE 100 MG/1
100 TABLET, FILM COATED ORAL
Qty: 30 TAB | Refills: 0 | Status: SHIPPED | OUTPATIENT
Start: 2021-01-11 | End: 2022-04-08 | Stop reason: SINTOL

## 2021-01-11 RX ORDER — HYDROXYZINE 50 MG/1
50 TABLET, FILM COATED ORAL
Qty: 30 TAB | Refills: 0 | Status: SHIPPED | OUTPATIENT
Start: 2021-01-11 | End: 2021-01-21

## 2021-01-11 RX ADMIN — LAMOTRIGINE 25 MG: 25 TABLET ORAL at 08:12

## 2021-01-11 RX ADMIN — HYDROXYZINE HYDROCHLORIDE 50 MG: 25 TABLET, FILM COATED ORAL at 09:01

## 2021-01-11 RX ADMIN — TOPIRAMATE 200 MG: 100 TABLET, FILM COATED ORAL at 08:12

## 2021-01-11 NOTE — PROGRESS NOTES
Problem: Falls - Risk of  Goal: *Absence of Falls  Description: Document Alphonse Fernández Fall Risk and appropriate interventions in the flowsheet. Outcome: Progressing Towards Goal  Note: Fall Risk Interventions:  Medication Interventions: Teach patient to arise slowly  Problem: Suicide  Goal: *STG: Remains safe in hospital  Outcome: Progressing Towards Goal  Goal: *STG/LTG: Complies with medication therapy  Outcome: Progressing Towards Goal   1930 Pt is isolative to room this evening. Reports anxiety 3/10 and depression 3/10. Pt denies s/I, h/I and a/v hallucinations. Pt spent the evening in her room resting in bed but she was socializing with her room mate. Accepted medication as prescribed. 2100 Pt continued to rest in her room c/o of anxiety 5/10 requested medication for anxiety, sleeplessness and indigestion. Pt medicated per order. Administered medication effective. Will continue to monitor.   0645 pt in bed eyes closed with even unlabored breathing for 9 hrs appearing to be asleep

## 2021-01-11 NOTE — PROGRESS NOTES
Problem: Falls - Risk of  Goal: *Absence of Falls  Description: Document Jessica Starr Fall Risk and appropriate interventions in the flowsheet.   Outcome: Progressing Towards Goal  Note: Fall Risk Interventions:            Medication Interventions: Teach patient to arise slowly                   Problem: Suicide  Goal: *STG: Remains safe in hospital  Outcome: Progressing Towards Goal  Goal: *STG: Seeks staff when feelings of self harm or harm towards others arise  Outcome: Progressing Towards Goal  Goal: *STG: Attends activities and groups  Outcome: Progressing Towards Goal  Goal: *STG:  Verbalizes alternative ways of dealing with maladaptive feelings/behaviors  Outcome: Progressing Towards Goal  Goal: *STG/LTG: Complies with medication therapy  Outcome: Progressing Towards Goal

## 2021-01-11 NOTE — DISCHARGE INSTRUCTIONS
Patient Education      If I feel I am at risk of hurting myself or others, I will call the crisis office and speak with a crisis worker who will assist me during my crisis. [address]  [phone]  [facility] [place] [address] [phone]  [facility]-  [phone]      Schizophrenia: Care Instructions  Your Care Instructions  Schizophrenia is a disease that makes it hard to think clearly, manage emotions, and interact with other people. It can cause:  · Delusions. These are beliefs that are not real.  · Hallucinations. These are things that you may see or hear that are not really there. · Paranoia. This is the belief that others are lying, cheating, using you, or trying to harm you. The disease may change your ability to enjoy life, express emotions, or function. At times, you may hear voices, behave strangely, have trouble speaking or understanding speech, or keep to yourself. The goal of treatment is to lower your stress and help your brain function normally. You may need lifelong treatment with medicines and counseling to keep your schizophrenia under control. When schizophrenia is not treated, the risks are higher for suicide, a hospital stay, and other problems. Early treatment called coordinated specialty care [facility]) may help a person who is having his or her first episode of psychotic thoughts. Ask your doctor about [address]. Follow-up care is a key part of your treatment and safety. Be sure to make and go to all appointments, and call your doctor if you are having problems. It's also a good idea to know your test results and keep a list of the medicines you take. How can you care for yourself at home? · Be safe with medicines. Take your medicines exactly as prescribed. Call your doctor if you think you are having a problem with your medicine.  When you feel good, you may think that you do not need your medicines. But it is important to keep taking them as scheduled. · Go to your counseling sessions. Call and talk with your counselor if you can't attend or if you don't think the sessions are helping. Do not just stop going. · Eat a healthy diet. Talk with a dietitian about what type of diet may be best for you. · Do not use alcohol or illegal drugs. · Keep the numbers for these national suicide hotlines: 5-288-977-TALK (9-401.195.7822) and 8-760-JJVZEOG (7-722.236.1820). If you or someone you know talks about suicide or feeling hopeless, get help right away. What should you do if someone in your family has schizophrenia? · Learn about the disease and how it may get worse over time. · Remind your family member that you love him or her. · Make a plan with all family members about how to take care of your loved one when his or her symptoms are bad. · Talk about your fears and concerns and those of other family members. Seek counseling if needed. · Do not focus attention only on the person who is in treatment. · Remind yourself that it will take time for changes to occur. · Do not blame yourself for the disease. · Know your legal rights and the legal rights of your family member. · Take care of yourself. Stay involved with your own interests, such as your career, hobbies, and friends. Use exercise, positive self-talk, relaxation, and deep breathing to help lower your stress. · Give yourself time to grieve. You may need to deal with emotions such as anger, fear, and frustration. After you work through your feelings, you will be better able to care for yourself and your family. · If you are having a hard time with your feelings and your interactions with your family member, talk with a counselor. When should you call for help? Call 911 anytime you think you may need emergency care.  For example, call if:    · You are thinking about suicide or are threatening suicide.     · You feel like hurting yourself or someone else. Call your doctor now or seek immediate medical care if:    · You hear voices.     · You think someone is trying to harm you.     · You cannot concentrate or are easily confused. Watch closely for changes in your health, and be sure to contact your doctor if:    · You are having trouble taking care of yourself.     · You cannot attend your counseling sessions. Where can you learn more? Go to http://www.gray.com/  Enter B628 in the search box to learn more about \"Schizophrenia: Care Instructions. \"  Current as of: 2020               Content Version: 12.6  © 2450-1240 [employer]. Care instructions adapted under license by [employer] (which disclaims liability or warranty for this information). If you have questions about a medical condition or this instruction, always ask your healthcare professional. [place] [address] any warranty or liability for your use of this information. DISCHARGE SUMMARY    Yoni Ramirez  : 1985  MRN: [id number]    The patient Pedro Acosta exhibits the ability to control behavior in a less restrictive environment. Patient's level of functioning is improving. No assaultive/destructive behavior has been observed for the past 24 hours. No suicidal/homicidal threat or behavior has been observed for the past 24 hours. There is no evidence of serious medication side effects. Patient has not been in physical or protective restraints for at least the past 24 hours. If weapons involved, how are they secured? No weapons    Is patient aware of and in agreement with discharge plan?  Patient agrees to return home and follow up with Cristi Chacon at [place] [address] for medication:  Prescriptions sent to community pharmacy    Copy of discharge instructions to provider?:  [facility] to [facility]    Arrangements for transportation home:  Tina    Keep all follow up appointments as scheduled, continue to take prescribed medications per physician instructions.   Mental health crisis number:  387 or your local mental health crisis line number at [address] [facility] Emergency WARM LINE      5-975-002-MHAV ([phone])      M-F: 9am to 9pm      Sat & Sun: 5pm - 9pm  National suicide prevention lines:                             0-166-JUZQZHE (3-777-397-008-041-0043)       9-176-495-TALK (8-267-310-276-563-0586)   24/7 Crisis Text Line:  Text HOME to 551021

## 2021-01-11 NOTE — BH NOTES
GROUP THERAPY PROGRESS NOTE    Patient is participating in Coping Skills group. Group time: 45 minutes    Personal goal for participation: To discussion communication issues/problems and ways to communicate more clearly. Goal orientation: Personal    Group therapy participation: active    Therapeutic interventions reviewed and discussed: Group discussion was focused issues that arise due to communication issues. Patients shared common issues including double messages, assumptions, hinting, unable to admit mistakes, and defensive responses. Patient discussed ways to communicate more clearly through the use of I statements. Impression of participation: Estefanía Fuentes was present in group. She shared ways that she would change the scenarios and issues she saw. She reports family does not listen to her and she finds them stressful at times. She reports that she found this group helpful and asked questions. She asked what can be done outside of the hospital to help with communication and was encouraged to speak to her outpatient provider about continued work on communication. She shared that she struggles with her mind going blank during the sessions and was encouraged to write down questions or thoughts that she could read to the therapist to use as a starting point for her sessions. She reported she would give this a try.     Tone May LPC [facility]

## 2021-01-11 NOTE — SUICIDE SAFETY PLAN
SAFETY PLAN 
 
A suicide Safety Plan is a document that supports someone when they are having thoughts of suicide. Warning Signs that indicate a suicidal crisis may be developing: What (situations, thoughts, feelings, body sensations, behaviors, etc.) do you experience that lets you know you are beginning to think about suicide? 1. *** 
2. *** 
3. *** Internal Coping Strategies:  What things can I do (relaxation techniques, hobbies, physical activities, etc.) to take my mind off my problems without contacting another person? 1. *** 
2. *** 
3. *** People and social settings that provide distraction: Who can I call or where can I go to distract me? 1. Name: ***  Phone: *** 
2. Name: ***  Phone: *** 3. Place: ***           
4. Place: *** People whom I can ask for help: Who can I call when I need help - for example, friends, family, clergy, someone else? 1. Name: ***                Phone: *** 
2. Name: ***  Phone: *** 
3. Name: ***  Phone: *** Professionals or [address] [place] [address] I can contact during a crisis: Who can I call for help - for example, my doctor, my psychiatrist, my psychologist, a mental health provider, a suicide hotline? 1. Clinician Name: ***   Phone: *** Clinician Pager or Emergency Contact #: *** 
 
2. Clinician Name: ***   Phone: *** Clinician Pager or Emergency Contact #: *** 
 
3. Suicide Prevention Lifeline: 1-414-897-TALK (0154) 4. [address] [place] [address] Emergency Services -  for example, [facility] suicide hotline, [facility] Hotline: *** Emergency Services Address: *** Emergency Services Phone: *** Making the environment safe: How can I make my environment (house/apartment/living space) safer? For example, can I remove guns, medications, and other items?  
1. *** 
2. ***

## 2021-01-11 NOTE — BH NOTES
Behavioral Health Treatment Team Note     Patient goal(s) for today: discharge  Treatment team focus/goals: review medications, set up outpatient services    Progress note: SW attempted to contact Emely Davidson at [facility] and left a voicemail requesting discharge appointments and a return call. Patient reports she did well over the weekend and is eager to return home. She agrees to contact Ms. Markus Ortiz when she gets home. SW confirmed discharge plan with patient's  who is in agreement. Patient reports she is sleeping well. Medications reviewed.      LOS:  4  Expected LOS: 4    Insurance info/prescription coverage:  [place] Medicare Part A&B and Medicaid coverage for [address] Only   Date of last family contact:  , Li Dominique- [phone] (1/11/21)  Family requesting physician contact today:  no  Discharge plan:  Return to home  Guns in the home:  no   Outpatient provider(s):  [address] [facility]    Participating treatment team members: Otto Leach MSW, Dr. Rosa Hare MD

## 2021-01-11 NOTE — BH NOTES
GROUP THERAPY PROGRESS NOTE    Patient is participating in Discharge Group. Group time: 30 minutes    Personal goal for participation: Process feelings related to discharge and/or feelings/goals for today. Goal orientation: Personal    Group therapy participation: active    Therapeutic interventions reviewed and discussed: Group discussion was focused on discharge plans and anxiety related to this. Group members discussed what they planned to do once discharge and discharge plans. Patients discussed their goals for today as well as the weekend and what they are working on with treatment team to get ready for discharge. Impression of participation: Cyndi Montelongo was present in group and shared that she hopes she will be going home today. She denied any concerns and reports that she had her  name that she will give to her . She stepped out to meet with her treatment team and then came back reporting she would be discharging home this evening with her . She shared her plans to go home with her  and go grocery shopping and cook meals for her family. She interacted with another patient who has similar plans/goals for when she discharged.    Ariel Baxter LPC [facility]

## 2021-01-11 NOTE — DISCHARGE SUMMARY
PSYCHIATRIC DISCHARGE SUMMARY         IDENTIFICATION:    Patient Name  Elinor Liriano   Date of Birth 1985   [facility] [id number]   Medical Record Number  [id number]      Age  28 y.o. PCP No primary care provider on file. Admit date:  1/7/2021    Discharge date: 1/11/2021   Room Number  322/01  @ [address]   Date of Service  1/11/2021            TYPE OF DISCHARGE: REGULAR               CONDITION AT DISCHARGE: improved       PROVISIONAL & DISCHARGE DIAGNOSES:    Problem List  Date Reviewed: 1/23/2017          Codes Class    * (Principal) Schizophrenia ([facility] ICD-10-CM: F20.9  ICD-9-CM: 295.90         Drug overdose ICD-10-CM: T50.901A  ICD-9-CM: 977.9, E980.5         Depression ICD-10-CM: F32.9  ICD-9-CM: 311         Mild carpal tunnel syndrome ICD-10-CM: G56.00  ICD-9-CM: 354.0         Ulnar neuropathy of left upper extremity ICD-10-CM: G56.22  ICD-9-CM: 354.2         H/O headache ICD-10-CM: Z87.898  ICD-9-CM: V13.89               Active Hospital Problems    *Schizophrenia ([facility]        DISCHARGE DIAGNOSIS:   Axis I:  SEE ABOVE  Axis II: SEE ABOVE  Axis III: SEE ABOVE  Axis IV:  lack of structure  Axis V:  <50 on admission, 55+ on discharge     CC & HISTORY OF PRESENT ILLNESS:  28-year-old female, thick accent, who reports having psychotic or schizophrenic symptoms in the past, seen at [facility] in a consult after she overdosed on acetaminophen. She had been on long-acting injectable medication that was changed around and since then she has had increased paranoia, not suicidal, not homicidal.  No auditory or visual hallucinations per se, but she is feeling that there are cameras all over the house watching her. She was in [facility] in the past and did well for 2 years afterwards until her doctors changed and she abruptly quit her job related to the change in paranoia. So, she was brought to the hospital for help.   She was treated at [address] [place] [address] for an overdose and then transferred here for further management of her care. SOCIAL HISTORY:    Social History     Socioeconomic History    Marital status:      Spouse name: Not on file    Number of children: Not on file    Years of education: Not on file    Highest education level: Not on file   Occupational History    Not on file   Social Needs    Financial resource strain: Not on file    Food insecurity     Worry: Not on file     Inability: Not on file    Transportation needs     Medical: Not on file     Non-medical: Not on file   Tobacco Use    Smoking status: Never Smoker    Smokeless tobacco: Never Used   Substance and Sexual Activity    Alcohol use: No    Drug use: No    Sexual activity: Yes     Partners: Male     Birth control/protection: I.U.D. Lifestyle    Physical activity     Days per week: Not on file     Minutes per session: Not on file    Stress: Not on file   Relationships    Social connections     Talks on phone: Not on file     Gets together: Not on file     Attends Orthodoxy service: Not on file     Active member of club or organization: Not on file     Attends meetings of clubs or organizations: Not on file     Relationship status: Not on file    Intimate partner violence     Fear of current or ex partner: Not on file     Emotionally abused: Not on file     Physically abused: Not on file     Forced sexual activity: Not on file   Other Topics Concern    Not on file   Social History Narrative    Home with children 3 healthy      FAMILY HISTORY:   Family History   Problem Relation Age of Onset    Diabetes Mother     Hypertension Mother     Diabetes Father     Hypertension Father     Cancer Neg Hx              HOSPITALIZATION COURSE:    Ankita Hdez was admitted to the inpatient psychiatric unit [facility] for acute psychiatric stabilization in regards to symptomatology as described in the HPI above.  The differential diagnosis at time of admission included: schizophrenia vs substance induced psychotic disorder schizoaffective vs bipolar vs adjustment disorder. While on the unit Parag Seymour was involved in individual, group, occupational and milieu therapy. Psychiatric medications were adjusted during this hospitalization. Parag Seymour demonstrated a progressive improvement in overall condition. Much of patient's initial presentation appeared to be related to situational stressors, effects of medication non-compliance and psychological factors. Please see individual progress notes for more specific details regarding patient's hospitalization course. Parag Seymour reports feeling well and moods are good. Denies SI/HI/AH/VH. No aggression or violence. Appropriately interactive and aware. Tolerating medications well. Eating and sleeping fairly. Patient with request for discharge today. There are no grounds to seek a TDO. At time of discharge, Parag Seymour is without significant problems of depression, psychosis, or alma. Patient free of suicidal and homicidal ideations (appears to be at very low risk of suicide or homicide) and reports many positive predictive factors in terms of not attempting suicide or homicide. Overall presentation at time of discharge is most consistent with the diagnosis of Paranoid Schizophrenia. Patient has maximized benefit to be derived from acute inpatient psychiatric treatment. All members of the treatment team concur with each other in regards to plans for discharge today. Patient and family are aware and in agreement with discharge and discharge plan.          LABS AND IMAGAING:    Labs Reviewed   CBC W/O DIFF - Abnormal; Notable for the following components:       Result Value    HGB 11.3 (*)     HCT 34.2 (*)     All other components within normal limits   LIPID PANEL - Abnormal; Notable for the following components:    Triglyceride 156 (*)     All other components within normal limits   TSH 3RD GENERATION   HEMOGLOBIN A1C WITH EAG     No results found for: DS35, PHEN, PHENO, PHENT, DILF, DS39, PHENY, PTN, VALF2, VALAC, VALP, VALPR, DS6, CRBAM, CRBAMP, CARB2, XCRBAM  Admission on 01/07/2021   Component Date Value Ref Range Status    WBC 01/08/2021 4.9  3.6 - 11.0 K/uL Final    RBC 01/08/2021 4.04  3.80 - 5.20 M/uL Final    HGB 01/08/2021 11.3* 11.5 - 16.0 g/dL Final    HCT 01/08/2021 34.2* 35.0 - 47.0 % Final    MCV 01/08/2021 84.7  80.0 - 99.0 FL Final    MCH 01/08/2021 28.0  26.0 - 34.0 PG Final    MCHC 01/08/2021 33.0  30.0 - 36.5 g/dL Final    RDW 01/08/2021 12.6  11.5 - 14.5 % Final    PLATELET 25/07/7935 591  150 - 400 K/uL Final    MPV 01/08/2021 9.9  8.9 - 12.9 FL Final    NRBC 01/08/2021 0.0  0  WBC Final    ABSOLUTE NRBC 01/08/2021 0.00  0.00 - 0.01 K/uL Final    LIPID PROFILE 01/09/2021        Final    Cholesterol, total 01/09/2021 178  <200 MG/DL Final    Triglyceride 01/09/2021 156* <150 MG/DL Final    HDL Cholesterol 01/09/2021 47  MG/DL Final    LDL, calculated 01/09/2021 99.8  0 - 100 MG/DL Final    VLDL, calculated 01/09/2021 31.2  MG/DL Final    CHOL/HDL Ratio 01/09/2021 3.8  0.0 - 5.0   Final    TSH 01/09/2021 3.49  0.36 - 3.74 uIU/mL Final    Hemoglobin A1c 01/09/2021 5.1  4.0 - 5.6 % Final    Est. average glucose 01/09/2021 100  mg/dL Final   No results displayed because visit has over 200 results. Xr Chest Port    Result Date: 1/5/2021  INDICATION: Suicidal, drug overdose, altered mental status. Portable AP semiupright view of the chest. There is no prior study for direct comparison Cardiomediastinal silhouette is stable. The lungs are hypoinflated. There is mild left basilar atelectasis. No pleural fluid is visualized and there is no pneumothorax. IMPRESSION: Mild left basilar atelectasis                   DISPOSITION:    Home. Patient to f/u with psychiatric, and psychotherapy appointments. Patient is to f/u with internist as directed. FOLLOW-UP CARE:    Activity as tolerated  Regular diet  Wound Care: none needed. Follow-up Information     Follow up With Specialties Details Why [facility]  Call today Please call and follow up with [address]                 PROGNOSIS:   Santhosh Found --- based on nature of patient's pathology/ies and treatment compliance issues. Prognosis is greatly dependent upon patient's ability to remain sober and to follow up with scheduled appointments as well as to comply with psychiatric medications as prescribed. DISCHARGE MEDICATIONS:     Informed consent given for the use of following psychotropic medications:  Current Discharge Medication List      START taking these medications    Details   hydrOXYzine HCL (ATARAX) 50 mg tablet Take 1 Tab by mouth three (3) times daily as needed for Anxiety for up to 10 days. Indications: anxious  Qty: 30 Tab, Refills: 0      lamoTRIgine (LaMICtal) 25 mg tablet Take 1 Tab by mouth daily. Indications: mood disorder  Qty: 45 Tab, Refills: 0    Comments: Take 1 tab for 2 weeks then 2 tabs for 2 weeks      traZODone (DESYREL) 50 mg tablet Take 1 Tab by mouth nightly as needed for Sleep (For insomnia). Indications: insomnia associated with depression  Qty: 30 Tab, Refills: 0         CONTINUE these medications which have CHANGED    Details   benztropine (COGENTIN) 1 mg tablet Take 1 Tab by mouth two (2) times daily as needed (soreness in muscles, stiffness). Indications: extrapyramidal symptoms as a result of taking the medication  Qty: 60 Tab, Refills: 0      paliperidone palmitate (INVEGA SUSTENNA) 156 mg/mL injection 1 mL by IntraMUSCular route every month. Indications: schizophrenia with mood changes, Mood disorder  Qty: 1 Syringe, Refills: 0      !! topiramate (TOPAMAX) 200 mg tablet Take 1 Tab by mouth daily.  Indications: migraine prevention  Qty: 30 Tab, Refills: 0      !! topiramate (TOPAMAX) 100 mg tablet Take 1 Tab by mouth nightly. Indications: migraine prevention  Qty: 30 Tab, Refills: 0       !! - Potential duplicate medications found. Please discuss with provider. STOP taking these medications       lithium carbonate 600 mg capsule Comments:   Reason for Stopping:         lithium carbonate CR (ESKALITH CR) 450 mg CR tablet Comments:   Reason for Stopping:                      A coordinated, multidisplinary treatment team round was conducted with P.ARMANI Wolff 191 is done daily here at [facility]. This team consists of the nurse, psychiatric unit pharmacist,  and Abigail Benjamin. I have spent greater than 35 minutes on discharge work.     Signed:  David Molina MD  1/11/2021

## 2021-01-11 NOTE — GROUP NOTE
UTE  GROUP DOCUMENTATION INDIVIDUAL Group Therapy Note Date: 1/11/2021 Group Start Time: 1000 Group End Time: 1100 Group Topic: Topic Group [facility] 3 ACUTE BEHAV [facility] [place], [address] GROUP DOCUMENTATION GROUP Group Therapy Note Attendees: 6 Attendance: Attended Patient's Goal:  To participate in mental health journey game Interventions/techniques: Supported-choices in recovery Follows Directions: Followed directions Interactions: Interacted appropriately Mental Status: Calm Behavior/appearance: Attentive and Cooperative Goals Achieved: Able to engage in interactions, Able to listen to others, Able to self-disclose, Discussed coping and Discussed self-esteem issues Additional Notes:   
 
Laurita Mercado

## 2021-01-11 NOTE — SUICIDE SAFETY PLAN
SAFETY PLAN    A suicide Safety Plan is a document that supports someone when they are having thoughts of suicide. Warning Signs that indicate a suicidal crisis may be developing: What (situations, thoughts, feelings, body sensations, behaviors, etc.) do you experience that lets you know you are beginning to think about suicide? 1. Feel like someone is choking me  2. Very depressed  3. Very tense    Internal Coping Strategies:  What things can I do (relaxation techniques, hobbies, physical activities, etc.) to take my mind off my problems without contacting another person? 1. Taking a walk  2. Going outside  3. People and social settings that provide distraction: Who can I call or where can I go to distract me? 1. Name: my   Phone: Jose Vicente- [phone]  2. Name: jose armando kids  Phone:    3. Place: going outside            4. Place:     People whom I can ask for help: Who can I call when I need help - for example, friends, family, clergy, someone else? 1. Name: Jose Vicente- [phone]                Phone:  2. Name: jose armando kids Phone:  3. Name: Celeste Dupont  Phone: [phone]    Professionals or [address] [place], [place] [address]vd I can contact during a crisis: Who can I call for help - for example, my doctor, my psychiatrist, my psychologist, a mental health provider, a suicide hotline? 1. Clinician Name: Reece5 FLETCHER Small   Phone: [phone]      Clinician Pager or Emergency Contact #: [phone]    2. Clinician Name:    Phone:       Clinician Pager or Emergency Contact #:     3. Suicide Prevention Lifeline: 3-154-774-TALK (9278)    4. [address] [place] [address] Emergency Services -  for example, [facility], [facility] suicide hotline, [facility] Hotline:       Emergency Services Address: [address] [phone]      Emergency Services Phone: [phone]    Making the environment safe:  How can I make my environment (house/apartment/living space) safer? For example, can I remove guns, medications, and other items? 1. No weapons  2.  No safety concerns

## 2021-01-12 NOTE — BH NOTES
Behavioral Health Transition Record to Provider    Patient Name: Carmen De Paz  YOB: 1985  Medical Record Number: [id number]  Date of Admission: 1/7/2021  Date of Discharge: 1/11/21    Attending Provider: No att. providers found  Discharging Provider: Dr. Kathryn Downs MD  To contact this individual call [phone] and ask the  to page. If unavailable, ask to be transferred to [address] [place] [address] Provider on call. [facility] Provider will be available on call 24/7 and during holidays. Primary Care Provider: No primary care provider on file. No Known Allergies    Reason for Admission: patient brought to ED for overdose on Tylenol- children called EMS because she was threatening to hurt herself. Patient's  reports increased paranoia. Patient reports that her doctor left [facility]) and the two doctors who took over changed all her medicine. She reports she took the Tylenol because of knee pain. She is unsure of medications or purpose but states that she needs them so that she is stable.      Admission Diagnosis: Schizophrenia ([facility] [F20.9]    * No surgery found *    Results for orders placed or performed during the hospital encounter of 01/07/21   CBC W/O DIFF   Result Value Ref Range    WBC 4.9 3.6 - 11.0 K/uL    RBC 4.04 3.80 - 5.20 M/uL    HGB 11.3 (L) 11.5 - 16.0 g/dL    HCT 34.2 (L) 35.0 - 47.0 %    MCV 84.7 80.0 - 99.0 FL    MCH 28.0 26.0 - 34.0 PG    MCHC 33.0 30.0 - 36.5 g/dL    RDW 12.6 11.5 - 14.5 %    PLATELET 446 014 - 154 K/uL    MPV 9.9 8.9 - 12.9 FL    NRBC 0.0 0  WBC    ABSOLUTE NRBC 0.00 0.00 - 0.01 K/uL   LIPID PANEL   Result Value Ref Range    LIPID PROFILE          Cholesterol, total 178 <200 MG/DL    Triglyceride 156 (H) <150 MG/DL    HDL Cholesterol 47 MG/DL    LDL, calculated 99.8 0 - 100 MG/DL    VLDL, calculated 31.2 MG/DL    CHOL/HDL Ratio 3.8 0.0 - 5.0     TSH 3RD GENERATION   Result Value Ref Range    TSH 3.49 0.36 - 3.74 uIU/mL   HEMOGLOBIN A1C WITH EAG   Result Value Ref Range    Hemoglobin A1c 5.1 4.0 - 5.6 %    Est. average glucose 100 mg/dL       Immunizations administered during this encounter: There is no immunization history for the selected administration types on file for this patient. Screening for Metabolic Disorders for Patients on Antipsychotic Medications  (Data obtained from the EMR)    Estimated Body Mass Index  Estimated body mass index is 49.2 kg/m² as calculated from the following:    Height as of this encounter: 5' 2\" (1.575 m). Weight as of this encounter: 122 kg (269 lb). Vital Signs/Blood Pressure  Visit Vitals  /80 (BP 1 Location: Right arm, BP Patient Position: Sitting)   Pulse 95   Temp 97.8 °F (36.6 °C)   Resp 16   Ht 5' 2\" (1.575 m)   Wt 122 kg (269 lb)   SpO2 98%   Breastfeeding No   BMI 49.20 kg/m²       Blood Glucose/Hemoglobin A1c  Lab Results   Component Value Date/Time    Glucose 108 (H) 01/07/2021 03:15 AM    Glucose (POC) 105 (H) 01/05/2021 10:42 AM       Lab Results   Component Value Date/Time    Hemoglobin A1c 5.1 01/09/2021 05:22 AM        Lipid Panel  Lab Results   Component Value Date/Time    Cholesterol, total 178 01/09/2021 05:22 AM    HDL Cholesterol 47 01/09/2021 05:22 AM    LDL, calculated 99.8 01/09/2021 05:22 AM    Triglyceride 156 (H) 01/09/2021 05:22 AM    CHOL/HDL Ratio 3.8 01/09/2021 05:22 AM        Discharge Diagnosis: please refer to physician's discharge summary    Discharge Plan: The patient Keron Roman exhibits the ability to control behavior in a less restrictive environment. Patient's level of functioning is improving. No assaultive/destructive behavior has been observed for the past 24 hours. No suicidal/homicidal threat or behavior has been observed for the past 24 hours. There is no evidence of serious medication side effects. Patient has not been in physical or protective restraints for at least the past 24 hours.     If weapons involved, how are they secured? No weapons    Is patient aware of and in agreement with discharge plan? Patient agrees to return home and follow up with Celeste Dupont at [facility] for medication:  Prescriptions sent to community pharmacy    Copy of discharge instructions to provider?:  Fax to [facility]    Arrangements for transportation home:  [place] all follow up appointments as scheduled, continue to take prescribed medications per physician instructions. Mental health crisis number:  958 or your local mental health crisis line number at [phone]      Discharge Medication List and Instructions:   Discharge Medication List as of 1/11/2021  1:14 PM      START taking these medications    Details   hydrOXYzine HCL (ATARAX) 50 mg tablet Take 1 Tab by mouth three (3) times daily as needed for Anxiety for up to 10 days. Indications: anxious, Normal, Disp-30 Tab, R-0      lamoTRIgine (LaMICtal) 25 mg tablet Take 1 Tab by mouth daily. Indications: mood disorder, Normal, Disp-45 Tab, R-0Take 1 tab for 2 weeks then 2 tabs for 2 weeks      traZODone (DESYREL) 50 mg tablet Take 1 Tab by mouth nightly as needed for Sleep (For insomnia). Indications: insomnia associated with depression, Normal, Disp-30 Tab, R-0         CONTINUE these medications which have CHANGED    Details   benztropine (COGENTIN) 1 mg tablet Take 1 Tab by mouth two (2) times daily as needed (soreness in muscles, stiffness). Indications: extrapyramidal symptoms as a result of taking the medication, Normal, Disp-60 Tab, R-0      paliperidone palmitate (INVEGA SUSTENNA) 156 mg/mL injection 1 mL by IntraMUSCular route every month. Indications: schizophrenia with mood changes, Mood disorder, Normal, Disp-1 Syringe, R-0      !! topiramate (TOPAMAX) 200 mg tablet Take 1 Tab by mouth daily. Indications: migraine prevention, Normal, Disp-30 Tab, R-0      !! topiramate (TOPAMAX) 100 mg tablet Take 1 Tab by mouth nightly.  Indications: migraine prevention, Normal, Disp-30 Tab, R-0       !! - Potential duplicate medications found. Please discuss with provider. STOP taking these medications       lithium carbonate 600 mg capsule Comments:   Reason for Stopping:         lithium carbonate CR (ESKALITH CR) 450 mg CR tablet Comments:   Reason for Stopping:               Unresulted Labs (24h ago, onward)    None        To obtain results of studies pending at discharge, please contact N/A    Follow-up Information     Follow up With Specialties Details Why Contact Info    [address] [place] [address]  Call today Please call and follow up with [address] [place] [address] [facility]  On 2/8/2021 Ilda Blair Sustenna 156 mg injection was given on 1/8/21. Next injection is due on 2/8/21           Advanced Directive:   Does the patient have an appointed surrogate decision maker? No  Does the patient have a Medical Advance Directive? No  Does the patient have a Psychiatric Advance Directive? No  If the patient does not have a surrogate or Medical Advance Directive AND Psychiatric Advance Directive, the patient was offered information on these advance directives Patient declined to complete    Patient Instructions: Please continue all medications until otherwise directed by physician. Tobacco Cessation Discharge Plan:   Is the patient a smoker and needs referral for smoking cessation? Not applicable  Patient referred to the following for smoking cessation with an appointment? Not applicable     Patient was offered medication to assist with smoking cessation at discharge? Not applicable  Was education for smoking cessation added to the discharge instructions? Not applicable    Alcohol/Substance Abuse Discharge Plan:   Does the patient have a history of substance/alcohol abuse and requires a referral for treatment?  Not applicable  Patient referred to the following for substance/alcohol abuse treatment with an appointment? Not applicable  Patient was offered medication to assist with alcohol cessation at discharge? Not applicable  Was education for substance/alcohol abuse added to discharge instructions? Not applicable    Patient discharged to Home; discussed with patient/caregiver and provided to the patient/caregiver either in hard copy or electronically.

## 2021-03-22 NOTE — H&P
[address] [facility] HISTORY AND PHYSICAL    Name:  Jessica Bledsoe  MR#:  [id number]  :  1985  ACCOUNT #:  [de-identified]  ADMIT DATE:  2021    PSYCHIATRIC INTAKE NOTE    CHIEF COMPLAINT:  \"My meds were changed by my new MD and my old one left and things have been strange ever since. \"    HISTORY OF PRESENT ILLNESS:  This is a 59-year-old female, thick accent, who reports having psychotic or schizophrenic symptoms in the past, seen at [place] in a consult after she overdosed on acetaminophen. She had been on long-acting injectable medication that was changed around and since then she has had increased paranoia, not suicidal, not homicidal.  No auditory or visual hallucinations per se, but she is feeling that there are cameras all over the house watching her. She was in [facility] in the past and did well for 2 years afterwards until her doctors changed and she abruptly quit her job related to the change in paranoia. So, she was brought to the hospital for help. She was treated at [facility] for an overdose and then transferred here for further management of her care. PAST PSYCHIATRIC HISTORY:  Depression; schizoaffective disorder, depressive type, likely with psychotic symptoms per se; treated 2 times in the past at [facility] and [facility] for similar symptoms of psychosis, hearing and seeing things she said. PAST MEDICAL HISTORY:  Pseudotumor cerebri, kidney stones, and depression. ALLERGIES:  NO KNOWN DRUG ALLERGIES. SOCIAL HISTORY:  ; 3 children; on disability for her mental condition. Denies alcohol, drugs, or cigarettes. Lives with her family. Was working, but apparently quit her job due to her paranoia. MENTAL STATUS EXAM:  Adult female. Thick accent, but coherent speech of average rate, volume, and tone. Mood, depressed. Affect, fair range. Thoughts are linear and goal directed.   Denies suicidal or homicidal ideations currently and no auditory or visual hallucinations. Aware of her surroundings, location, and situation. Here for management of her condition. DIAGNOSIS:  Schizoaffective disorder, depressive type. PLAN:  Admit for safety and stabilization, medication modification as needed, group therapy and individual therapy. ESTIMATED LENGTH OF STAY:  Five to seven days. DISPOSITION:  Planning with Social Work. STRENGTHS:  Willingness for treatment. DISABILITIES:  Inconsistent treatment due to doctor retiring and having to procure new one. RHIANNON Flynn MD      [id number]  D:  01/08/2021 19:15  T:  01/09/2021 2:24  JOB #:  [id number] Unchanged

## 2021-04-01 ENCOUNTER — TELEPHONE (OUTPATIENT)
Dept: FAMILY MEDICINE CLINIC | Age: 36
End: 2021-04-01

## 2021-04-01 NOTE — TELEPHONE ENCOUNTER
Advise is unable to be given to pt as she has not been seen here at office before but I do see pt is scheduled for appt already .

## 2021-04-01 NOTE — TELEPHONE ENCOUNTER
----- Message from Gen Gruber sent at 3/31/2021  5:11 PM EDT -----  Regarding: New pt; Level 1  Contact: [phone]  Pt experiencing dizziness and trouble speaking. New pt advised to seek appropriate level of care. PT scheduled soonest with preferred female provider only,  4.6. 21.

## 2021-04-06 ENCOUNTER — VIRTUAL VISIT (OUTPATIENT)
Dept: FAMILY MEDICINE CLINIC | Age: 36
End: 2021-04-06
Payer: MEDICARE

## 2021-04-06 DIAGNOSIS — R55 SYNCOPE, UNSPECIFIED SYNCOPE TYPE: Primary | ICD-10-CM

## 2021-04-06 DIAGNOSIS — Z76.89 ENCOUNTER TO ESTABLISH CARE: ICD-10-CM

## 2021-04-06 PROCEDURE — G0463 HOSPITAL OUTPT CLINIC VISIT: HCPCS | Performed by: STUDENT IN AN ORGANIZED HEALTH CARE EDUCATION/TRAINING PROGRAM

## 2021-04-06 PROCEDURE — G9717 DOC PT DX DEP/BP F/U NT REQ: HCPCS | Performed by: STUDENT IN AN ORGANIZED HEALTH CARE EDUCATION/TRAINING PROGRAM

## 2021-04-06 PROCEDURE — 99204 OFFICE O/P NEW MOD 45 MIN: CPT | Performed by: STUDENT IN AN ORGANIZED HEALTH CARE EDUCATION/TRAINING PROGRAM

## 2021-04-06 PROCEDURE — G8427 DOCREV CUR MEDS BY ELIG CLIN: HCPCS | Performed by: STUDENT IN AN ORGANIZED HEALTH CARE EDUCATION/TRAINING PROGRAM

## 2021-04-06 NOTE — PROGRESS NOTES
Melisa Seals  28 y.o. female  1985  [address] [place] [address]  [id number]   [address]:    Telemedicine Progress Note  Jason Barkley MD       Encounter Date and Time: 2021 at 3:52 PM    Consent: Melisa Seals, who was seen by synchronous (real-time) audio-video technology, and/or her healthcare decision maker, is aware that this patient-initiated, Telehealth encounter on 2021 is a billable service, with coverage as determined by her insurance carrier. She is aware that she may receive a bill and has provided verbal consent to proceed: Yes. Chief Complaint   Patient presents with    Loss of Consciousness     History of Present Illness   Melisa Seals is a 28 y.o. female was evaluated by synchronous (real-time) audio-video technology from home, through a secure patient portal.    Previous PCP: Ray Bhandari  PMH: Pseudotumor Cerebri, GERD, schizophrenia, depression, carpal tunnel syndrome. Social history: Lives with three children and . Denies tobacco, ETOH or recreational drug use   GYN history:  LMP 3/10/21. IUD in place. Last PAP was last year at , always normal.   Current medications:   - Atarax 50 mg tab PRN  - lamictal 25 mg tba daily  - trazodone 50 mg tab daily  - Benztropine 1 mg BID PRN for EPS  - Invega 1mL IM q30d  - Topamax 200 mg dialy and 100 mg QHS     Patient with son translating for her. Patient given the option of using  however requested her son translate. Patient states that she has had two syncopal episodes in the past four months. First episode was on 21 after tylenol overdose and the second one was over a week ago. Patient states that the most recent episode was after receiving injection at Psychiatrist's office that lasted a couple of minutes, regained consciousness immediately after and symptoms resolved after drinking something sweet and eating.  Patient states that prior to fainting felt her heart drop and her head went numb. It was a witness episode. Son denies any seizure like activity, incontinence or prolonged postictal state. Patient denies any history of seizures or cardiac problems. Per chart review patient was seen by dr. Reshma Leyva, Neurologist in 2016 for frequent headaches and hx of pseudotumor cerebri however never followed up as she was supposed to. Patient follows with Psychiatry. During most recent admission three months ago  Labs were obtained including TSH, , A1c, mg which were wnl. Acetaminophen level on admission was 152. CBC with hgb 11.3. Patient denies any headache, nausea, confusion, dizziness or  CP at this time    Review of Systems   Review of Systems   Constitutional: Negative for chills and fever. HENT: Negative for congestion and sore throat. Eyes: Negative for blurred vision and double vision. Respiratory: Negative for cough, hemoptysis, sputum production, shortness of breath and wheezing. Cardiovascular: Negative for chest pain, palpitations and leg swelling. Gastrointestinal: Negative for abdominal pain, blood in stool, constipation, diarrhea, heartburn, melena, nausea and vomiting. Genitourinary: Negative for dysuria and urgency. Musculoskeletal: Negative for back pain and joint pain. Skin: Negative for rash. Neurological: Negative for dizziness, focal weakness and headaches. Psychiatric/Behavioral: Negative for suicidal ideas.        Vitals/Objective:     General: alert, cooperative, no distress   Mental  status: mental status: alert, oriented to person, place, and time, normal mood, behavior, speech, dress, motor activity, and thought processes   Resp: resp: normal effort and no respiratory distress   Neuro: neuro: no gross deficits   Skin: skin: no discoloration or lesions of concern on visible areas   Due to this being a TeleHealth evaluation, many elements of the physical examination are unable to be assessed. Assessment and Plan:   1. Syncope:  differential are broad including vasovagal, situational, orthostatic, cardiogenic, neurologic, polypharmacy etc. Has had two episodes in the past four months. First one was after tylenol overdose and second one was after receiving IM injection at Psychiatrist's office. Each episode had prodromal symptoms, lasted minutes and recovered consciousness spontaneously after a couple of minutes. Both episodes were witnessed and did not have seizure like activity. During previous admission three months ago,  Labs were obtained including TSH, , A1c, mg which were wnl. Acetaminophen level on admission was 152. CBC with hgb 11.3. Patient was seen by Dr. Ana Quiroz in 2016 however failed to follow up. - patient would benefit from physical exam, sent message to schedule in person appointment as soon as possible  - Orthostatics  - EKG  - ECHO ADULT COMPLETE; Future  - [address]; Future  - CBC WITH AUTOMATED DIFF; Future  - REFERRAL TO NEUROLOGY  - DRUG SCREEN, URINE; Future  - AMB POC URINE PREGNANCY TEST, VISUAL COLOR COMPARISON  - METABOLIC PANEL, COMPREHENSIVE; Future  - PHOSPHORUS; Future    Establish care:  - Health maintenance reviewed  - Updated history      Time spent in direct conversation with the patient to include medical condition(s) discussed, assessment and treatment plan:       We discussed the expected course, resolution and complications of the diagnosis(es) in detail. Medication risks, benefits, costs, interactions, and alternatives were discussed as indicated. I advised her to contact the office if her condition worsens, changes or fails to improve as anticipated. She expressed understanding with the diagnosis(es) and plan. Patient understands that this encounter was a temporary measure, and the importance of further follow up and examination was emphasized. Patient verbalized understanding. Patient informed to follow up: this week. Electronically Signed: Marco Maloney MD    CPT Codes 82866-59737 for Established Patients may apply to this Telehealth Visit. POS code: [id number] Domínguez is a 28 y.o. female who was evaluated by an audio-video encounter for concerns as above. Patient identification was verified prior to start of the visit. A caregiver was present when appropriate. Due to this being a TeleHealth encounter (During [facility]-02 public health emergency), evaluation of the following organ systems was limited: Vitals/Constitutional/EENT/Resp/CV/GI//MS/Neuro/Skin/Heme-Lymph-Imm. Pursuant to the emergency declaration under the [address] [place] [address], [facility] waiver authority and the [employer] and [employer] Act, this Virtual Visit was conducted, with patient's (and/or legal guardian's) consent, to reduce the patient's risk of exposure to COVID-19 and provide necessary medical care. Services were provided through a synchronous discussion virtually to substitute for in-person clinic visit. I was at home. The patient was at home. History   Patients past medical, surgical and family histories were reviewed and updated.       Past Medical History:   Diagnosis Date    Calculus of kidney     Depression 1/5/2021    H/O headache 7/8/2013    Headache(784.0)     Kidney disease     Pseudotumor cerebri syndrome 7/8/2013     Past Surgical History:   Procedure Laterality Date    EMG LIMITED  2/12/2016     Family History   Problem Relation Age of Onset    Diabetes Mother     Hypertension Mother     Diabetes Father     Hypertension Father     Cancer Neg Hx      Social History     Tobacco Use    Smoking status: Never Smoker    Smokeless tobacco: Never Used   Substance Use Topics    Alcohol use: No    Drug use: No     Patient Active Problem List   Diagnosis Code    H/O headache Z87.898    Mild carpal tunnel syndrome G56.00    Ulnar neuropathy of left upper extremity G56.22    Drug overdose T50.901A    Depression F32.9    Schizophrenia (HCC) F20.9          Current Medications/Allergies   Medications and Allergies reviewed:    Current Outpatient Medications   Medication Sig Dispense Refill    benztropine (COGENTIN) 1 mg tablet Take 1 Tab by mouth two (2) times daily as needed (soreness in muscles, stiffness). Indications: extrapyramidal symptoms as a result of taking the medication 60 Tab 0    paliperidone palmitate (INVEGA SUSTENNA) 156 mg/mL injection 1 mL by IntraMUSCular route every month. Indications: schizophrenia with mood changes, Mood disorder 1 Syringe 0    topiramate (TOPAMAX) 200 mg tablet Take 1 Tab by mouth daily. Indications: migraine prevention 30 Tab 0    topiramate (TOPAMAX) 100 mg tablet Take 1 Tab by mouth nightly. Indications: migraine prevention 30 Tab 0    lamoTRIgine (LaMICtal) 25 mg tablet Take 1 Tab by mouth daily. Indications: mood disorder 45 Tab 0    traZODone (DESYREL) 50 mg tablet Take 1 Tab by mouth nightly as needed for Sleep (For insomnia).  Indications: insomnia associated with depression 30 Tab 0     No Known Allergies

## 2021-04-06 NOTE — PROGRESS NOTES
[address] Medicine Residency Attending Addendum:  Dr. Sadia Candelario MD,  the patient and I were not physically present during this encounter. The resident and I are concurrently monitoring the patient care through appropriate telecommunication technology. I discussed the findings, assessment and plan with the resident and agree with the resident's findings and plan as documented in the resident's note.       Bonnie Ames MD

## 2021-04-06 NOTE — PROGRESS NOTES
Teresa Ivey 
28 y.o. female 1985 [address] [place] [address] 
[id number] 
 
[phone] (home) [address]:   
Telephone Encounter Annia Paz MD 
  
 
Encounter Date: 2021 at 1:48 PM 
 
Consent: Teresa Ivey, who was seen by synchronous (real-time) audio only technology, and/or her healthcare decision maker, is aware that this patient-initiated, Telehealth encounter on 2021 is a billable service, with coverage as determined by her insurance carrier. She is aware that she may receive a bill and has provided verbal consent to proceed: Yes. Chief Complaint Patient presents with  Loss of Consciousness History of Present Illness Teresa Ivey is a 28 y.o. female was evaluated by telephone. I communicated with the patient and/or health care decision maker about syncope and establishing care. Previous PCP: Jeanie Hanson PMH: Pseudotumor Cerebri, GERD, schizophrenia, depression, carpal tunnel syndrome. Social history: Lives with three children and . Denies tobacco, ETOH or recreational drug use GYN history:  LMP 3/10/21. IUD in place. Last PAP was last year at , always normal.  
Current medications:  
- Atarax 50 mg tab PRN 
- lamictal 25 mg tba daily 
- trazodone 50 mg tab daily - Benztropine 1 mg BID PRN for EPS 
- Invega 1mL IM q30d - Topamax 200 mg PRN migraine Patient with son translating for her. Patient given the option of using  however requested her son translate. Patient states that she has had two syncopal episodes in the past four months. First episode was on 21 after tylenol overdose and the second one was over a week ago.  Patient states that the most recent episode was after receiving injection at Psychiatrist's office that lasted a couple of minutes, regained consciousness immediately after and symptoms resolved after drinking something sweet and eating. Patient states that prior to fainting felt her heart drop and her head went numb. It was a witness episode. Son denies any seizure like activity, incontinence or prolonged postictal state. Patient denies any history of seizures or cardiac problems. Per chart review patient was seen by dr. Haleigh Sanders, Neurologist in 2016 for frequent headaches and hx of pseudotumor cerebri however never followed up as she was supposed to. Patient follows with Psychiatry. During most recent admission three months ago  Labs were obtained including TSH, , A1c, mg which were wnl. Acetaminophen level on admission was 152. CBC with hgb 11.3. Patient denies any headache, nausea, confusion, dizziness or  CP at this time Review of Systems Review of Systems Constitutional: Negative for chills and fever. HENT: Negative for congestion and sore throat. Eyes: Negative for blurred vision and double vision. Respiratory: Negative for cough, hemoptysis, sputum production, shortness of breath and wheezing. Cardiovascular: Negative for chest pain, palpitations and leg swelling. Gastrointestinal: Negative for abdominal pain, blood in stool, constipation, diarrhea, heartburn, melena, nausea and vomiting. Genitourinary: Negative for dysuria and urgency. Musculoskeletal: Negative for back pain and joint pain. Skin: Negative for rash. Neurological: Negative for dizziness, focal weakness and headaches. Psychiatric/Behavioral: Negative for suicidal ideas. Vitals/Objective:  
General: Patient speaking in complete sentences without effort. Normal speech and cooperative. Due to this being a Virtual Check-in/Telephone evaluation, many elements of the physical examination are unable to be assessed. Assessment and Plan: Total Time: minutes: 21-30 minutes 1.  Syncope:  differential are broad including vasovagal, orthostatic, cardiogenic, neurologic, polypharmacy etc. During previous admission three months ago Labs were obtained including TSH, , A1c, mg which were wnl. Acetaminophen level on admission was 152. CBC with hgb 11.3. Patient was seen by Dr. Zoe Horton in 2016 however failed to follow up. - patient would benefit from physical exam, sent message to schedule in person appointment 
- EKG 
- ECHO ADULT COMPLETE; Future - CARDIAC HOLTER MONITOR; Future - CBC WITH AUTOMATED DIFF; Future 
- REFERRAL TO NEUROLOGY 
- DRUG SCREEN, URINE; Future - AMB POC URINE PREGNANCY TEST, VISUAL COLOR COMPARISON 
- METABOLIC PANEL, COMPREHENSIVE; Future 
- PHOSPHORUS; Future Patient informed to follow up: in person this week. I affirm this is a Patient Initiated Episode with an Established Patient who has not had a related appointment within my department in the past 7 days or scheduled within the next 24 hours. Note: not billable if this call serves to triage the patient into an appointment for the relevant concern Electronically Signed: Vero Solitario MD 
Providers location when delivering service: home CPT: 
23733 (5-10 minutes) 17903 (11-20 minutes) 77725 (21-30 minutes) Medicare: 
 - Virtual Check-in ICD-10-CM ICD-9-CM 1. Syncope, unspecified syncope type  R55 780.2 ECHO ADULT COMPLETE  
   CARDIAC HOLTER MONITOR  
   CBC WITH AUTOMATED DIFF  
   REFERRAL TO NEUROLOGY DRUG SCREEN, URINE  
   AMB POC URINE PREGNANCY TEST, VISUAL COLOR COMPARISON  
   METABOLIC PANEL, COMPREHENSIVE  
   PHOSPHORUS  
   CANCELED: MAGNESIUM Pursuant to the emergency declaration under the [facility], [facility]5 waiver authority and the [employer] and [employer] Act, this Virtual  Visit was conducted, with patient's consent, to reduce the patient's risk of exposure to COVID-19 and provide continuity of care for an established patient. History Patients past medical, surgical and family histories were personally reviewed and updated. Past Medical History:  
Diagnosis Date  Calculus of kidney  Depression 1/5/2021  H/O headache 7/8/2013  Headache(784.0)  Kidney disease  Pseudotumor cerebri syndrome 7/8/2013 Past Surgical History:  
Procedure Laterality Date  EMG LIMITED  2/12/2016 Family History Problem Relation Age of Onset  Diabetes Mother  Hypertension Mother  Diabetes Father  Hypertension Father  Cancer Neg Hx Social History Tobacco Use  Smoking status: Never Smoker  Smokeless tobacco: Never Used Substance Use Topics  Alcohol use: No  
 Drug use: No  
 
 
 
  
 
Current Medications/Allergies Medications and Allergies reviewed: 
 
Current Outpatient Medications Medication Sig Dispense Refill  benztropine (COGENTIN) 1 mg tablet Take 1 Tab by mouth two (2) times daily as needed (soreness in muscles, stiffness). Indications: extrapyramidal symptoms as a result of taking the medication 60 Tab 0  
 paliperidone palmitate (INVEGA SUSTENNA) 156 mg/mL injection 1 mL by IntraMUSCular route every month. Indications: schizophrenia with mood changes, Mood disorder 1 Syringe 0  
 topiramate (TOPAMAX) 200 mg tablet Take 1 Tab by mouth daily. Indications: migraine prevention 30 Tab 0  
 topiramate (TOPAMAX) 100 mg tablet Take 1 Tab by mouth nightly. Indications: migraine prevention 30 Tab 0  
 lamoTRIgine (LaMICtal) 25 mg tablet Take 1 Tab by mouth daily. Indications: mood disorder 45 Tab 0  
 traZODone (DESYREL) 50 mg tablet Take 1 Tab by mouth nightly as needed for Sleep (For insomnia). Indications: insomnia associated with depression 30 Tab 0 No Known Allergies

## 2021-04-07 ENCOUNTER — TELEPHONE (OUTPATIENT)
Dept: FAMILY MEDICINE CLINIC | Age: 36
End: 2021-04-07

## 2021-04-07 NOTE — TELEPHONE ENCOUNTER
----- Message from Gely Guerrero MD sent at 4/6/2021  2:12 PM EDT -----  Regarding: in person visit  Hi,   Please schedule in person appointment, any provider is fine.      Thank you

## 2021-04-08 ENCOUNTER — LAB ONLY (OUTPATIENT)
Dept: FAMILY MEDICINE CLINIC | Age: 36
End: 2021-04-08

## 2021-04-08 ENCOUNTER — TELEPHONE (OUTPATIENT)
Dept: FAMILY MEDICINE CLINIC | Age: 36
End: 2021-04-08

## 2021-04-08 DIAGNOSIS — R55 SYNCOPE, UNSPECIFIED SYNCOPE TYPE: ICD-10-CM

## 2021-04-08 DIAGNOSIS — R55 SYNCOPE, UNSPECIFIED SYNCOPE TYPE: Primary | ICD-10-CM

## 2021-04-08 LAB
HCG URINE, QL. (POC): NEGATIVE
VALID INTERNAL CONTROL?: YES

## 2021-04-09 LAB
ALBUMIN SERPL-MCNC: 4 G/DL (ref 3.5–5)
ALBUMIN/GLOB SERPL: 1.2 {RATIO} (ref 1.1–2.2)
ALP SERPL-CCNC: 79 U/L (ref 45–117)
ALT SERPL-CCNC: 21 U/L (ref 12–78)
AMPHET UR QL SCN: NEGATIVE
ANION GAP SERPL CALC-SCNC: 8 MMOL/L (ref 5–15)
AST SERPL-CCNC: 14 U/L (ref 15–37)
BARBITURATES UR QL SCN: NEGATIVE
BASOPHILS # BLD: 0.1 K/UL (ref 0–0.1)
BASOPHILS NFR BLD: 1 % (ref 0–1)
BENZODIAZ UR QL: NEGATIVE
BILIRUB SERPL-MCNC: 0.9 MG/DL (ref 0.2–1)
BUN SERPL-MCNC: 10 MG/DL (ref 6–20)
BUN/CREAT SERPL: 14 (ref 12–20)
CALCIUM SERPL-MCNC: 9.3 MG/DL (ref 8.5–10.1)
CANNABINOIDS UR QL SCN: NEGATIVE
CHLORIDE SERPL-SCNC: 107 MMOL/L (ref 97–108)
CO2 SERPL-SCNC: 24 MMOL/L (ref 21–32)
COCAINE UR QL SCN: NEGATIVE
CREAT SERPL-MCNC: 0.71 MG/DL (ref 0.55–1.02)
DIFFERENTIAL METHOD BLD: ABNORMAL
DRUG SCRN COMMENT,DRGCM: NORMAL
EOSINOPHIL # BLD: 0.1 K/UL (ref 0–0.4)
EOSINOPHIL NFR BLD: 2 % (ref 0–7)
ERYTHROCYTE [DISTWIDTH] IN BLOOD BY AUTOMATED COUNT: 12.6 % (ref 11.5–14.5)
GLOBULIN SER CALC-MCNC: 3.4 G/DL (ref 2–4)
GLUCOSE SERPL-MCNC: 80 MG/DL (ref 65–100)
HCT VFR BLD AUTO: 41 % (ref 35–47)
HGB BLD-MCNC: 12.9 G/DL (ref 11.5–16)
IMM GRANULOCYTES # BLD AUTO: 0.1 K/UL (ref 0–0.04)
IMM GRANULOCYTES NFR BLD AUTO: 1 % (ref 0–0.5)
LYMPHOCYTES # BLD: 2.5 K/UL (ref 0.8–3.5)
LYMPHOCYTES NFR BLD: 42 % (ref 12–49)
MCH RBC QN AUTO: 26.9 PG (ref 26–34)
MCHC RBC AUTO-ENTMCNC: 31.5 G/DL (ref 30–36.5)
MCV RBC AUTO: 85.4 FL (ref 80–99)
METHADONE UR QL: NEGATIVE
MONOCYTES # BLD: 0.4 K/UL (ref 0–1)
MONOCYTES NFR BLD: 7 % (ref 5–13)
NEUTS SEG # BLD: 2.9 K/UL (ref 1.8–8)
NEUTS SEG NFR BLD: 47 % (ref 32–75)
NRBC # BLD: 0 K/UL (ref 0–0.01)
NRBC BLD-RTO: 0 PER 100 WBC
OPIATES UR QL: NEGATIVE
PCP UR QL: NEGATIVE
PHOSPHATE SERPL-MCNC: 3 MG/DL (ref 2.6–4.7)
PLATELET # BLD AUTO: 365 K/UL (ref 150–400)
PMV BLD AUTO: 10.8 FL (ref 8.9–12.9)
POTASSIUM SERPL-SCNC: 3.9 MMOL/L (ref 3.5–5.1)
PROT SERPL-MCNC: 7.4 G/DL (ref 6.4–8.2)
RBC # BLD AUTO: 4.8 M/UL (ref 3.8–5.2)
SODIUM SERPL-SCNC: 139 MMOL/L (ref 136–145)
WBC # BLD AUTO: 5.9 K/UL (ref 3.6–11)

## 2021-06-08 ENCOUNTER — OFFICE VISIT (OUTPATIENT)
Dept: NEUROLOGY | Age: 36
End: 2021-06-08
Payer: MEDICARE

## 2021-06-08 VITALS
HEIGHT: 64 IN | DIASTOLIC BLOOD PRESSURE: 84 MMHG | HEART RATE: 89 BPM | BODY MASS INDEX: 43.43 KG/M2 | SYSTOLIC BLOOD PRESSURE: 118 MMHG | OXYGEN SATURATION: 99 % | WEIGHT: 254.4 LBS

## 2021-06-08 DIAGNOSIS — R55 SYNCOPE, UNSPECIFIED SYNCOPE TYPE: Primary | ICD-10-CM

## 2021-06-08 DIAGNOSIS — G43.709 CHRONIC MIGRAINE WITHOUT AURA WITHOUT STATUS MIGRAINOSUS, NOT INTRACTABLE: ICD-10-CM

## 2021-06-08 DIAGNOSIS — G93.2 PSEUDOTUMOR CEREBRI SYNDROME: ICD-10-CM

## 2021-06-08 PROCEDURE — G9717 DOC PT DX DEP/BP F/U NT REQ: HCPCS | Performed by: PSYCHIATRY & NEUROLOGY

## 2021-06-08 PROCEDURE — G8417 CALC BMI ABV UP PARAM F/U: HCPCS | Performed by: PSYCHIATRY & NEUROLOGY

## 2021-06-08 PROCEDURE — G8428 CUR MEDS NOT DOCUMENT: HCPCS | Performed by: PSYCHIATRY & NEUROLOGY

## 2021-06-08 PROCEDURE — 99204 OFFICE O/P NEW MOD 45 MIN: CPT | Performed by: PSYCHIATRY & NEUROLOGY

## 2021-06-08 RX ORDER — CHOLECALCIFEROL (VITAMIN D3) 125 MCG
5 CAPSULE ORAL
COMMUNITY
End: 2022-10-19

## 2021-06-08 RX ORDER — ERENUMAB-AOOE 70 MG/ML
70 INJECTION SUBCUTANEOUS ONCE
Qty: 2 EACH | Refills: 0 | Status: CANCELLED | COMMUNITY
Start: 2021-06-08 | End: 2021-06-08

## 2021-06-08 RX ORDER — ERENUMAB-AOOE 70 MG/ML
70 INJECTION SUBCUTANEOUS
Qty: 2 EACH | Refills: 0 | Status: SHIPPED | COMMUNITY
Start: 2021-06-08 | End: 2021-09-15 | Stop reason: SDUPTHER

## 2021-06-08 NOTE — PROGRESS NOTES
Zaira Lombardi (1985) is a 28 y.o. female, new patient, here for evaluation of the following     Chief complaint(s):   Chief Complaint   Patient presents with    Eye Pain     RT eye, uses aleve. x 1year. was in [facility] x 3 days for this in 2010(joshua)       HPI: 28 y.o. female      Patient referred for syncope evaluation. Reviewed notes. Pt had an episode of syncope on 1-5-21 (suicide attempt, overdosed on tylenol, was evaluated/ treated at [facility]). 2nd episode was while at Psychiatrist office, had IM injection of something, passed out. No other episodes of passing out. She describes other episodes where has a sudden heavy sensation on her chest, hard to breath. She feels like she's going to pass out. She will try to eat something sugary/ sugary drink and then feel better. Is not diabetic. No personal hx CAD, MI, or other cardiac issues. She wants to talk about chronic right eye pain. Has hx of pseudotumor cerebri, dx [id number] at [facility], due to an episode of bilateral visual loss. Has had intermittent right eye stabbing pain for years. can go up to 1-2 weeks without the pain. When she gets eye eye pain + right sided headache, it can happen on and off for a few days, waxing-waning in intensity. Review of Systems: as above     ==================================================    No Known Allergies    Current Outpatient Medications   Medication Sig Dispense Refill    melatonin 5 mg tablet Take 5 mg by mouth nightly.  erenumab-aooe (Aimovig Autoinjector) 70 mg/mL injection 1 mL by SubCUTAneous route every thirty (30) days. To reduce number of headache days. 2 Each 0    benztropine (COGENTIN) 1 mg tablet Take 1 Tab by mouth two (2) times daily as needed (soreness in muscles, stiffness). Indications: extrapyramidal symptoms as a result of taking the medication 60 Tab 0    paliperidone palmitate (INVEGA SUSTENNA) 156 mg/mL injection 1 mL by IntraMUSCular route every month.  Indications: schizophrenia with mood changes, Mood disorder 1 Syringe 0    topiramate (TOPAMAX) 200 mg tablet Take 1 Tab by mouth daily. Indications: migraine prevention 30 Tab 0    topiramate (TOPAMAX) 100 mg tablet Take 1 Tab by mouth nightly. Indications: migraine prevention 30 Tab 0    lamoTRIgine (LaMICtal) 25 mg tablet Take 1 Tab by mouth daily. Indications: mood disorder 45 Tab 0    traZODone (DESYREL) 50 mg tablet Take 1 Tab by mouth nightly as needed for Sleep (For insomnia). Indications: insomnia associated with depression 30 Tab 0       Past Medical History:   Diagnosis Date    Calculus of kidney     Depression 1/5/2021    H/O headache 7/8/2013    Headache(784.0)     Kidney disease     Pseudotumor cerebri syndrome 7/8/2013       Past Surgical History:   Procedure Laterality Date    EMG LIMITED  2/12/2016       family history includes Diabetes in her father and mother; Hypertension in her father and mother. reports that she has never smoked. She has never used smokeless tobacco. She reports that she does not drink alcohol and does not use drugs. PHYSICAL EXAM    Vitals:    06/08/21 1411   BP: 118/84   Pulse: 89   Height: 5' 4\" (1.626 m)   Weight: 115.4 kg (254 lb 6.4 oz)   SpO2: 99%       General:   Head: atraumatic. Eyes: Conjunctivae and cornea clear. Vascular/ Carotid Arteries: not examined. Extremities: no edema. Skin: no rashes. Neurologic Exam:  Speech/ Language: normal.   Alertness: oriented person, place, situation. CNs: Smell: not tested. Visual Fields (II): full to confrontation. Pupils (II): equal, round, reactive to light. Funduscopic:  normal bilateral.  Extraocular movements (III, IV, VI): conjugate in all directions, no JOSETTE. Ptosis (III, VII): none. Facial Sensation (V): intact to LT on both sides. Facial Movements (VII): symmetric at rest and on activation. Hearing (VIII): normal.  Soft palate elevation (IX): symmetric. Shoulder shrug (XI): symmetric, strong. Tongue protrusion (XII): midline      Motor:  5/5 in all exts. Sensory: intact LT, temp, vibration in all exts. Cerebellar: no resting, postural, or intention tremors; normal FNF bilateral .  Deep Tendon Reflexes:  symmetric, 1+. Plantar response: not tested. Gait: normal.  Romberg: negative    ==================================================      ASSESSMENT/ PLAN:       ICD-10-CM ICD-9-CM    1. Syncope, unspecified syncope type  R55 780.2 EEG AMB NEURO      MRI BRAIN WO CONT   2. Pseudotumor cerebri syndrome  G93.2 348.2    3. Chronic migraine without aura without status migrainosus, not intractable  G43.709 346.70 erenumab-aooe (Aimovig Autoinjector) 70 mg/mL injection      Discussed with patient that her syncopal episodes by history are non-epileptic  One was triggered by intentional overdose, 2nd was after receiving an injection (therefore vasovagal episode)  Will check EEG and Brain MRI to complete syncope evaluation    Hx of Pseudotumor cerebri (IIH), hx of chronic migraine; no papilledema on funduscopic exam today. Discussed starting Aimovig once a month (every 30 days) SQ injector to reduce migraine frequency. She wanted to do that. Pt says she's going to [facility] for 2 months. Gave her sample of Aimovig 70 mg SQ injector to get started with and sent a Rx for one injector her local pharmacy to fill before she goes. This will cover a 2 month period. Follow up with me when returning from [facility]. An electronic signature was used to authenticate this note.   -- Latrell Saldivar MD

## 2021-06-10 ENCOUNTER — OFFICE VISIT (OUTPATIENT)
Dept: NEUROLOGY | Age: 36
End: 2021-06-10

## 2021-06-10 DIAGNOSIS — G43.709 CHRONIC MIGRAINE WITHOUT AURA WITHOUT STATUS MIGRAINOSUS, NOT INTRACTABLE: Primary | ICD-10-CM

## 2021-06-10 PROCEDURE — 95819 EEG AWAKE AND ASLEEP: CPT | Performed by: PSYCHIATRY & NEUROLOGY

## 2021-06-10 RX ORDER — ERENUMAB-AOOE 70 MG/ML
70 INJECTION SUBCUTANEOUS ONCE
Qty: 1 EACH | Refills: 0 | Status: SHIPPED | COMMUNITY
Start: 2021-06-10 | End: 2021-06-10

## 2021-09-15 ENCOUNTER — OFFICE VISIT (OUTPATIENT)
Dept: NEUROLOGY | Age: 36
End: 2021-09-15
Payer: MEDICARE

## 2021-09-15 VITALS
TEMPERATURE: 97.8 F | WEIGHT: 260 LBS | DIASTOLIC BLOOD PRESSURE: 84 MMHG | SYSTOLIC BLOOD PRESSURE: 118 MMHG | OXYGEN SATURATION: 99 % | BODY MASS INDEX: 44.63 KG/M2 | HEART RATE: 88 BPM

## 2021-09-15 DIAGNOSIS — R55 VASOVAGAL SYNCOPE: ICD-10-CM

## 2021-09-15 DIAGNOSIS — R55 SYNCOPE, UNSPECIFIED SYNCOPE TYPE: ICD-10-CM

## 2021-09-15 DIAGNOSIS — G43.709 CHRONIC MIGRAINE WITHOUT AURA WITHOUT STATUS MIGRAINOSUS, NOT INTRACTABLE: Primary | ICD-10-CM

## 2021-09-15 PROCEDURE — 99214 OFFICE O/P EST MOD 30 MIN: CPT | Performed by: PSYCHIATRY & NEUROLOGY

## 2021-09-15 PROCEDURE — G8417 CALC BMI ABV UP PARAM F/U: HCPCS | Performed by: PSYCHIATRY & NEUROLOGY

## 2021-09-15 PROCEDURE — G9717 DOC PT DX DEP/BP F/U NT REQ: HCPCS | Performed by: PSYCHIATRY & NEUROLOGY

## 2021-09-15 PROCEDURE — G8427 DOCREV CUR MEDS BY ELIG CLIN: HCPCS | Performed by: PSYCHIATRY & NEUROLOGY

## 2021-09-15 RX ORDER — PREDNISONE 10 MG/1
TABLET ORAL
Qty: 21 TABLET | Refills: 0 | Status: SHIPPED | OUTPATIENT
Start: 2021-09-15 | End: 2022-03-04 | Stop reason: ALTCHOICE

## 2021-09-15 RX ORDER — ERENUMAB-AOOE 70 MG/ML
70 INJECTION SUBCUTANEOUS
Qty: 1 EACH | Refills: 2 | Status: SHIPPED | OUTPATIENT
Start: 2021-09-15 | End: 2022-03-04 | Stop reason: ALTCHOICE

## 2021-09-15 NOTE — PROCEDURES
Name:   Zaira Lombardi  Procedure:   EEG     Location:   Office    Date of Recordin-10-21    Date of Interpretation:    09/15/21    Interpreting Physician: Justus Landeros MD     Indication:  28 y.o. female with 2 syncopal episodes      Current medications: has a current medication list which includes the following prescription(s): melatonin, aimovig autoinjector, benztropine, topiramate, lamotrigine, paliperidone palmitate, topiramate, and trazodone. Technical: Digital EEG recorded in 10-20 international placement system, multiple montages    Interpretation:  Patient is awake at onset of this recording. The background pattern in symmetric, medium in amplitude with the PDR being 8-9Hz, symmetric. Photic stimulation and Hyperventilation were not performed. Drowsiness and sleep were recorded and were normal.  Single lead EKG was normal.  There were no epileptiform discharges, focal areas of slowing, or electrographic seizures during this recording. Impression:  Normal awake, drowsy, and brief sleep EEG. Clinical correlation is necessary.

## 2021-09-15 NOTE — PROGRESS NOTES
Magui Archuleta (1985) is a 28 y.o. female, established patient, here for evaluation of the following     Chief complaint(s):   Chief Complaint   Patient presents with    Follow-up     Follow up migraines; Occurred 4 times this week, gets better shortly after medication       SUBJECTIVE/ OBJECTIVE:    HPI: 28 y.o. female Hx Depression, Kidney Disease, Headaches, Kidney stones, Hx of Pseudotumor Cerebri (7-8-13)    Following up to go over MRI, EEG results, response to meds regarding headaches and syncopal episode    MRI Brain: Not completed yet    EEG (6/10/2021):Normal awake drowsy and brief sleep EEG    Has done 2 rounds of Aimovig 70 mg subcu once monthly injection  Says after starting Aimovig she did not have any severe headache until 4 days ago  Has had bad headache for the past 4 days  Continues on Topamax 200 mg AM/ 100 mg PM (for mood; Prescribed by Psychiatry)        Review of Systems: as above    ========================================    Prior Data/ Hx:     From initial visit 6/8/2021.  29 yo female referred for syncope evaluation. Reviewed notes. Pt had an episode of syncope on 1-5-21 (suicide attempt, overdosed on tylenol, was evaluated/ treated at [facility]). 2nd episode was while at Psychiatrist office, had IM injection of something, passed out. No other episodes of passing out. She describes other episodes where has a sudden heavy sensation on her chest, hard to breath. She feels like she's going to pass out. She will try to eat something sugary/ sugary drink and then feel better. Is not diabetic. No personal hx CAD, MI, or other cardiac issues.      She wants to talk about chronic right eye pain. Has hx of pseudotumor cerebri, dx [phone] at [facility], due to an episode of bilateral visual loss. Has had intermittent right eye stabbing pain for years. can go up to 1-2 weeks without the pain.   When she gets eye eye pain + right sided headache, it can happen on and off for a few days, waxing-waning in intensity. Impression/ Plan:   1. Discussed with patient that her syncopal episodes by history are non-epileptic. One was triggered by intentional overdose, 2nd was after receiving an injection (therefore vasovagal episode). Will check EEG and Brain MRI to complete syncope evaluation. 2. Hx of Pseudotumor cerebri (IIH), hx of chronic migraine; no papilledema on funduscopic exam today. Discussed starting Aimovig once a month (every 30 days) SQ injector to reduce migraine frequency. She wanted to do that. Gave her sample of Aimovig 70 mg SQ injector to get started with and sent a Rx for one injector her local pharmacy to fill before she goes. This will cover a 2 month period. Follow up with when returning from [facility]. No Known Allergies      Current Outpatient Medications:     predniSONE (DELTASONE) 10 mg tablet, Take 6 tabs on Day 1, then reduce by 1 tablet a day over the next 5 days till done. Take in AM with food. To break current headache cycle, Disp: 21 Tablet, Rfl: 0    erenumab-aooe (Aimovig Autoinjector) 70 mg/mL injection, 1 mL by SubCUTAneous route every thirty (30) days. To reduce number of headache days. , Disp: 1 Each, Rfl: 2    melatonin 5 mg tablet, Take 5 mg by mouth nightly., Disp: , Rfl:     benztropine (COGENTIN) 1 mg tablet, Take 1 Tab by mouth two (2) times daily as needed (soreness in muscles, stiffness). Indications: extrapyramidal symptoms as a result of taking the medication, Disp: 60 Tab, Rfl: 0    topiramate (TOPAMAX) 200 mg tablet, Take 1 Tab by mouth daily. Indications: migraine prevention, Disp: 30 Tab, Rfl: 0    lamoTRIgine (LaMICtal) 25 mg tablet, Take 1 Tab by mouth daily. Indications: mood disorder, Disp: 45 Tab, Rfl: 0    topiramate (TOPAMAX) 100 mg tablet, Take 1 Tab by mouth nightly.  Indications: migraine prevention (Patient not taking: Reported on 9/15/2021), Disp: 30 Tab, Rfl: 0    traZODone (DESYREL) 50 mg tablet, Take 1 Tab by mouth nightly as needed for Sleep (For insomnia). Indications: insomnia associated with depression (Patient not taking: Reported on 9/15/2021), Disp: 30 Tab, Rfl: 0     has a past medical history of Calculus of kidney, Depression (1/5/2021), H/O headache (7/8/2013), Headache(784.0), Kidney disease, and Pseudotumor cerebri syndrome (7/8/2013). She also has no past medical history of Anemia, Asthma, Chlamydia, Complication of anesthesia, Diabetes ([facility] [address] Genital herpes, unspecified, Gonorrhea, Heart abnormality, Herpes gestationis, Herpes simplex without mention of complication, Human immunodeficiency virus (HIV) disease ([facility] [address] Infertility, female, Liver disease, Phlebitis and thrombophlebitis of unspecified site, Postpartum depression, Rhesus isoimmunization unspecified as to episode of care in pregnancy, Sickle-cell disease, unspecified, Syphilis, Systemic lupus erythematosus ([facility] [address] Thyroid activity decreased, Trauma, Unspecified breast disorder, Unspecified diseases of blood and blood-forming organs, or Unspecified epilepsy without mention of intractable epilepsy. has a past surgical history that includes emg limited (2/12/2016). Physical Exam:    Vitals:    09/15/21 1517   BP: 118/84   BP 1 Location: Left arm   BP Patient Position: Sitting   BP Cuff Size: Large adult   Pulse: 88   Temp: 97.8 °F (36.6 °C)   TempSrc: Temporal   Weight: 117.9 kg (260 lb)   SpO2: 99%     No exam performed today    ========================================    ASSESSMENT/ PLAN:       ICD-10-CM ICD-9-CM    1. Chronic migraine without aura without status migrainosus, not intractable  G43.709 346.70 predniSONE (DELTASONE) 10 mg tablet      erenumab-aooe (Aimovig Autoinjector) 70 mg/mL injection   2. Syncope, unspecified syncope type  R55 780.2    3. Vasovagal syncope  R55 780.2       Continue Aimovig 70 mg SQ once a month (once every 30 days) as pt reports significant reduced headache frequency since being on it.   Sent 6 day course of prednisone to break ongoing headache cycle. Syncopal episodes in past: non-epileptic by description. EEG was normal.  Reprinted MRI Brain order and advised pt to complete it so we can exclude any intracranial abnormalities as a cause of her syncopal episodes. Follow up in 3 month       An electronic signature was used to authenticate this note.   -- Rupesh Rausch MD

## 2021-09-15 NOTE — PROGRESS NOTES
Chief Complaint   Patient presents with    Follow-up     Follow up migraines;  Occurred 4 times this week, gets better shortly after medication     Visit Vitals  /84 (BP 1 Location: Left arm, BP Patient Position: Sitting, BP Cuff Size: Large adult)   Pulse 88   Temp 97.8 °F (36.6 °C) (Temporal)   Wt 117.9 kg (260 lb)   SpO2 99%   BMI 44.63 kg/m²

## 2022-03-04 ENCOUNTER — OFFICE VISIT (OUTPATIENT)
Dept: FAMILY MEDICINE CLINIC | Age: 37
End: 2022-03-04
Payer: MEDICARE

## 2022-03-04 VITALS
RESPIRATION RATE: 18 BRPM | HEART RATE: 70 BPM | TEMPERATURE: 98 F | DIASTOLIC BLOOD PRESSURE: 73 MMHG | HEIGHT: 64 IN | BODY MASS INDEX: 46.26 KG/M2 | WEIGHT: 271 LBS | SYSTOLIC BLOOD PRESSURE: 105 MMHG | OXYGEN SATURATION: 100 %

## 2022-03-04 DIAGNOSIS — N92.6 ABNORMAL MENSES: ICD-10-CM

## 2022-03-04 DIAGNOSIS — Z11.59 ENCOUNTER FOR HEPATITIS C SCREENING TEST FOR LOW RISK PATIENT: ICD-10-CM

## 2022-03-04 DIAGNOSIS — E66.01 CLASS 3 SEVERE OBESITY WITH SERIOUS COMORBIDITY AND BODY MASS INDEX (BMI) OF 45.0 TO 49.9 IN ADULT, UNSPECIFIED OBESITY TYPE (HCC): Primary | ICD-10-CM

## 2022-03-04 DIAGNOSIS — R06.83 SNORING: ICD-10-CM

## 2022-03-04 DIAGNOSIS — Z23 NEED FOR TDAP VACCINATION: ICD-10-CM

## 2022-03-04 DIAGNOSIS — R73.03 PREDIABETES: ICD-10-CM

## 2022-03-04 PROCEDURE — 90471 IMMUNIZATION ADMIN: CPT | Performed by: STUDENT IN AN ORGANIZED HEALTH CARE EDUCATION/TRAINING PROGRAM

## 2022-03-04 PROCEDURE — 90715 TDAP VACCINE 7 YRS/> IM: CPT | Performed by: STUDENT IN AN ORGANIZED HEALTH CARE EDUCATION/TRAINING PROGRAM

## 2022-03-04 PROCEDURE — G8427 DOCREV CUR MEDS BY ELIG CLIN: HCPCS | Performed by: STUDENT IN AN ORGANIZED HEALTH CARE EDUCATION/TRAINING PROGRAM

## 2022-03-04 PROCEDURE — G9717 DOC PT DX DEP/BP F/U NT REQ: HCPCS | Performed by: STUDENT IN AN ORGANIZED HEALTH CARE EDUCATION/TRAINING PROGRAM

## 2022-03-04 PROCEDURE — 99214 OFFICE O/P EST MOD 30 MIN: CPT | Performed by: STUDENT IN AN ORGANIZED HEALTH CARE EDUCATION/TRAINING PROGRAM

## 2022-03-04 PROCEDURE — G8417 CALC BMI ABV UP PARAM F/U: HCPCS | Performed by: STUDENT IN AN ORGANIZED HEALTH CARE EDUCATION/TRAINING PROGRAM

## 2022-03-04 PROCEDURE — G0463 HOSPITAL OUTPT CLINIC VISIT: HCPCS | Performed by: STUDENT IN AN ORGANIZED HEALTH CARE EDUCATION/TRAINING PROGRAM

## 2022-03-04 RX ORDER — LAMOTRIGINE 100 MG/1
100 TABLET ORAL
COMMUNITY
Start: 2022-02-09 | End: 2022-10-13

## 2022-03-04 RX ORDER — PALIPERIDONE PALMITATE 234 MG/1.5ML
INJECTION INTRAMUSCULAR
COMMUNITY
Start: 2022-02-21 | End: 2022-10-13

## 2022-03-04 RX ORDER — HYDROXYZINE 50 MG/1
TABLET, FILM COATED ORAL
COMMUNITY
Start: 2022-01-19 | End: 2022-10-19

## 2022-03-04 NOTE — PROGRESS NOTES
Subjective   CC:  Ash Oden is a 39 y.o. female who presents with concerns for weight gain. Weight gain: Pt presents at the request of her psychiatrist, Dr. Jessica Jordan for weight gain. Noticed weight gain x 2 years - previously closer to 190-200lbs in 2020, but has been steadily gaining weight w/ her weight today at 271 lbs. She states she was advised to take Diurex (takes red package - main ingredient is 100mg of caffeine) for weight loss. She reportedly has been taking 5-6 of these pills each day. Notes that her lower extremities become swollen and she is too fatigued when she does not use Diurex. Also states she been going to the gym and walking on the treadmill for a couple hours each day with some noted weight loss. Also notes on diet recall that she has been transitioning to a \"vegan diet\" where she eats lettuce, beans, potatoes, and rice. She has also been eating bread and phuong beans as one of her meals. Once she is done with this diet, she will return to eating meat - cooks meat in butter. Sometimes feels dizzy when she eats only lettuce. Also has been drinking water and one can of Pepsi almost every day. Has coffee or tea with sugar once or twice a day. Abnormal menses: LMP 2/27. Has been having irregular periods throughout her life, but notably more irregular - every 2-6 months; since she started putting on more weight. Notes that she has \"more normal periods when I take my multivitamin\". Snoring: Has noticed more snoring and daytime sleepiness since weight gain. Takes Melatonin at night which does help her sleep. HM:   Pap - 2018, normal - next is in 2023  Due for Tdap  S/p COVID vaccine x 2, due for booster    Review of Systems   Constitutional: Negative for activity change, chills and fever. HENT: Negative for congestion and sore throat. Respiratory: Negative for cough, chest tightness and shortness of breath. Cardiovascular: Positive for leg swelling.  Negative for chest pain.   Gastrointestinal: Negative for abdominal pain, constipation, diarrhea, nausea and vomiting. Endocrine: Negative for cold intolerance, heat intolerance, polydipsia, polyphagia and polyuria. Musculoskeletal: Negative for myalgias. Skin: Negative for rash. Neurological: Negative for dizziness, weakness and headaches. Past Medical History  Past Medical History:   Diagnosis Date    Calculus of kidney     Depression 1/5/2021    H/O headache 7/8/2013    Headache(784.0)     Kidney disease     Pseudotumor cerebri syndrome 7/8/2013       Current Medications  Current Outpatient Medications   Medication Sig Dispense Refill    Invega Sustenna 234 mg/1.5 mL injection INJECT 1 SYRINGE SUBCUTANEOUSLY AS DIRECTED EVERY 3 WEEKS      hydrOXYzine HCL (ATARAX) 50 mg tablet TAKE 1 TABLET BY MOUTH AT BEDTIME AS NEEDED FOR SLEEP DO NOT DRIVE IF SEDATED      lamoTRIgine (LaMICtal) 100 mg tablet Take 100 mg by mouth nightly.  melatonin 5 mg tablet Take 5 mg by mouth nightly.  topiramate (TOPAMAX) 100 mg tablet Take 1 Tab by mouth nightly. Indications: migraine prevention 30 Tab 0       Allergies  No Known Allergies    Social History   Social History     Socioeconomic History    Marital status:      Spouse name: Not on file    Number of children: Not on file    Years of education: Not on file    Highest education level: Not on file   Occupational History    Not on file   Tobacco Use    Smoking status: Never Smoker    Smokeless tobacco: Never Used   Substance and Sexual Activity    Alcohol use: No    Drug use: No    Sexual activity: Yes     Partners: Male     Birth control/protection: I.U.D.    Other Topics Concern    Not on file   Social History Narrative    Home with children 3 healthy     Social Determinants of Health     Financial Resource Strain:     Difficulty of Paying Living Expenses: Not on file   Food Insecurity:     Worried About Running Out of Food in the Last Year: Not on file    [address] in the Last Year: Not on file   Transportation Needs:     Lack of Transportation (Medical): Not on file    Lack of Transportation (Non-Medical): Not on file   Physical Activity:     Days of Exercise per Week: Not on file    Minutes of Exercise per Session: Not on file   Stress:     Feeling of Stress : Not on file   Social Connections:     Frequency of Communication with Friends and Family: Not on file    Frequency of Social Gatherings with Friends and Family: Not on file    Attends Jain Services: Not on file    Active Member of [address] [place] [address] [employer] or Organizations: Not on file    Attends Club or Organization Meetings: Not on file    Marital Status: Not on file   Intimate Partner Violence:     Fear of Current or Ex-Partner: Not on file    Emotionally Abused: Not on file    Physically Abused: Not on file    Sexually Abused: Not on file   Housing Stability:     Unable to Pay for Housing in the Last Year: Not on file    Number of [place] in the Last Year: Not on file    Unstable Housing in the Last Year: Not on file       Family History  Family History   Problem Relation Age of Onset    Diabetes Mother     Hypertension Mother     Diabetes Father     Hypertension Father     Cancer Neg Hx        Objective   Vital Signs  Visit Vitals  /73 (BP 1 Location: Left upper arm, BP Patient Position: Sitting, BP Cuff Size: Large adult long)   Pulse 70   Temp 98 °F (36.7 °C) (Temporal)   Resp 18   Ht 5' 4\" (1.626 m)   Wt 271 lb (122.9 kg)   LMP 02/22/2022 (Exact Date)   SpO2 100%   BMI 46.52 kg/m²       Physical Examination  Physical Exam  Constitutional:       Appearance: Normal appearance. She is obese. HENT:      Head: Normocephalic and atraumatic. Eyes:      Conjunctiva/sclera: Conjunctivae normal.   Cardiovascular:      Rate and Rhythm: Normal rate and regular rhythm. Pulmonary:      Effort: Pulmonary effort is normal.      Breath sounds: Normal breath sounds. Abdominal:      General: Abdomen is flat. Palpations: Abdomen is soft. Musculoskeletal:         General: Normal range of motion. Cervical back: Normal range of motion and neck supple. Right lower leg: No edema. Left lower leg: No edema. Skin:     General: Skin is warm. Neurological:      General: No focal deficit present. Mental Status: She is alert and oriented to person, place, and time. Assessment and Plan   Ravinder Farrell is a 39 y.o. who is here for weight gain. 1. Class 3 severe obesity with serious comorbidity and body mass index (BMI) of 45.0 to 49.9 in adult, unspecified obesity type ([address]  Discussed dietary and lifestyle modifications in great detail with patient and her family member. Discussed cutting back on sugary beverages and carb heavy foods. Advised to try olive oil as a substitute for butter. Given information on Mediterranean diet. Also encouraged increased protein intake. Discussed with patient that MVI is not a replacement for macronutrients that are part of a balanced diet. Will refer to weight loss clinic to be established and will refer to nutrition.   - STOP OTC Diurex.  - HEMOGLOBIN A1C WITH EAG; Future  - CBC W/O DIFF; Future  - METABOLIC PANEL, COMPREHENSIVE; Future  - TSH 3RD GENERATION; Future  - LIPID PANEL; Future  - REFERRAL TO NUTRITION  - LIPID PANEL  - TSH 3RD GENERATION  - METABOLIC PANEL, COMPREHENSIVE  - CBC W/O DIFF  - HEMOGLOBIN A1C WITH EAG    2. Abnormal menses  Likely secondary to weight gain. Low suspicion for pregnancy given recent period. Also with a history of preDM which raises suspicion for PCOS as a possible factor. No vision changes, but will check prolactin. Also will check TSH given weight gain and abnl menses. - CBC W/O DIFF; Future  - TSH 3RD GENERATION; Future  - FSH AND LH; Future  - PROLACTIN; Future  - PROLACTIN  - FSH AND LH  - TSH 3RD GENERATION  - CBC W/O DIFF    3.  Snoring  Likely CHANDA based on description and recent weight gain.  - TSH 3RD GENERATION; Future  - LIPID PANEL; Future  - SLEEP MEDICINE REFERRAL  - LIPID PANEL  - TSH 3RD GENERATION    4. Prediabetes  - HEMOGLOBIN A1C WITH EAG; Future  - CBC W/O DIFF; Future  - METABOLIC PANEL, COMPREHENSIVE; Future  - TSH 3RD GENERATION; Future  - LIPID PANEL; Future  - REFERRAL TO NUTRITION  - LIPID PANEL  - TSH 3RD GENERATION  - METABOLIC PANEL, COMPREHENSIVE  - CBC W/O DIFF  - HEMOGLOBIN A1C WITH EAG    5. Encounter for hepatitis C screening test for low risk patient  - TSH 3RD GENERATION; Future  - HEPATITIS C AB; Future  - HEPATITIS C AB  - TSH 3RD GENERATION    6. Need for Tdap vaccination  - TETANUS, DIPHTHERIA TOXOIDS AND ACELLULAR PERTUSSIS VACCINE (TDAP), IN INDIVIDS. >=7, IM  - [place] IMMUNIZ ADMIN,1 SINGLE/COMB VAC/TOXOID       RTC 2 weeks with weight loss clinic    Patient is counseled to return to the office if symptoms do not improve as expected. Urgent consultation with the nearest Emergency Department is strongly recommended if condition worsens. Patient is counseled to follow up as recommended and to inform the office if any changes in treatment are recommended.       I discussed this patient with Dr. Carol Mello (Attending Physician)       Signed By:  Ricardo Hinds MD  Family Medicine Resident

## 2022-03-04 NOTE — PROGRESS NOTES
Ash Oden is a 39 y.o. female    Chief Complaint   Patient presents with    Dysmenorrhea     has irregular periods - usually every 2-3 months. LMP 2/22/22. her psych doctor recommended she have labs checked - also wants TSH checked    Referral Request     would like referral to dietician       1. \"Have you been to the ER, urgent care clinic since your last visit? Hospitalized since your last visit? \" No    2. \"Have you seen or consulted any other health care providers outside of the [address] [place] [address] since your last visit? \" No     3. For patients aged 39-70: Has the patient had a colonoscopy / FIT/ Cologuard? NA - based on age      If the patient is female:    4. For patients aged 41-77: Has the patient had a mammogram within the past 2 years? NA - based on age or sex      11. For patients aged 21-65: Has the patient had a pap smear? No    Vitals:    03/04/22 0913   BP: 105/73   BP 1 Location: Left upper arm   BP Patient Position: Sitting   BP Cuff Size: Large adult long   Pulse: 70   Temp: 98 °F (36.7 °C)   TempSrc: Temporal   Resp: 18   Height: 5' 4\" (1.626 m)   Weight: 271 lb (122.9 kg)   SpO2: 100%             Health Maintenance Due   Topic Date Due    Hepatitis C Screening  Never done    Depression Monitoring  Never done    DTaP/Tdap/Td series (1 - Tdap) Never done    Cervical cancer screen  07/10/2016    Medicare Yearly Exam  Never done    Flu Vaccine (1) Never done    COVID-19 Vaccine (3 - Booster for Pfizer series) 09/24/2021         Medication Reconciliation completed, changes noted.   Please update medication list.

## 2022-03-04 NOTE — PATIENT INSTRUCTIONS
Learning About the [facility] Diet  What is the Mediterranean diet? The Mediterranean diet is a style of eating rather than a diet plan. It features foods eaten in [place], [place], [place] and [place], and other countries along the [facility]. It emphasizes eating foods like fish, fruits, vegetables, beans, high-fiber breads and whole grains, nuts, and olive oil. This style of eating includes limited red meat, cheese, and sweets. Why choose the Mediterranean diet? A Mediterranean-style diet may improve heart health. It contains more fat than other heart-healthy diets. But the fats are mainly from nuts, unsaturated oils (such as fish oils and olive oil), and certain nut or seed oils (such as canola, soybean, or flaxseed oil). These fats may help protect the heart and blood vessels. How can you get started on the Mediterranean diet? Here are some things you can do to switch to a more Mediterranean way of eating. What to eat  · Eat a variety of fruits and vegetables each day, such as grapes, blueberries, tomatoes, broccoli, peppers, figs, olives, spinach, eggplant, beans, lentils, and chickpeas. · Eat a variety of whole-grain foods each day, such as oats, brown rice, and whole wheat bread, pasta, and couscous. · Eat fish at least 2 times a week. Try tuna, salmon, mackerel, lake trout, herring, or sardines. · Eat moderate amounts of low-fat dairy products, such as milk, cheese, or yogurt. · Eat moderate amounts of poultry and eggs. · Choose healthy (unsaturated) fats, such as nuts, olive oil, and certain nut or seed oils like canola, soybean, and flaxseed. · Limit unhealthy (saturated) fats, such as butter, palm oil, and coconut oil. And limit fats found in animal products, such as meat and dairy products made with whole milk. Try to eat red meat only a few times a month in very small amounts. · Limit sweets and desserts to only a few times a week.  This includes sugar-sweetened drinks like soda. The Mediterranean diet may also include red wine with your meal--1 glass each day for women and up to 2 glasses a day for men. Tips for eating at home  · Use herbs, spices, garlic, lemon zest, and citrus juice instead of salt to add flavor to foods. · Add avocado slices to your sandwich instead of barone. · Have fish for lunch or dinner instead of red meat. Brush the fish with olive oil, and broil or grill it. · Sprinkle your salad with seeds or nuts instead of cheese. · Cook with olive or canola oil instead of butter or oils that are high in saturated fat. · Switch from 2% milk or whole milk to 1% or fat-free milk. · Dip raw vegetables in a vinaigrette dressing or hummus instead of dips made from mayonnaise or sour cream.  · Have a piece of fruit for dessert instead of a piece of cake. Try baked apples, or have some dried fruit. Tips for eating out  · Try broiled, grilled, baked, or poached fish instead of having it fried or breaded. · Ask your  to have your meals prepared with olive oil instead of butter. · Order dishes made with marinara sauce or sauces made from olive oil. Avoid sauces made from cream or mayonnaise. · Choose whole-grain breads, whole wheat pasta and pizza crust, brown rice, beans, and lentils. · Cut back on butter or margarine on bread. Instead, you can dip your bread in a small amount of olive oil. · Ask for a side salad or grilled vegetables instead of french fries or chips. Where can you learn more? Go to http://www.brenner.com/  Enter O407 in the search box to learn more about \"Learning About the Mediterranean Diet. \"  Current as of: December 17, 2020               Content Version: 13.0  © 5400-3416 Healthwise, Incorporated. Care instructions adapted under license by [employer] (which disclaims liability or warranty for this information).  If you have questions about a medical condition or this instruction, always ask your healthcare professional. [place] [address] any warranty or liability for your use of this information. Starting a Weight Loss Plan: Care Instructions  Overview     If you're thinking about losing weight, it can be hard to know where to start. Your doctor can help you set up a weight loss plan that best meets your needs. You may want to take a class on nutrition or exercise, or you could join a weight loss support group. If you have questions about how to make changes to your eating or exercise habits, ask your doctor about seeing a registered dietitian or an exercise specialist.  It can be a big challenge to lose weight. But you don't have to make huge changes at once. Make small changes, and stick with them. When those changes become habit, add a few more changes. If you don't think you're ready to make changes right now, try to pick a date in the future. Make an appointment to see your doctor to discuss whether the time is right for you to start a plan. Follow-up care is a key part of your treatment and safety. Be sure to make and go to all appointments, and call your doctor if you are having problems. It's also a good idea to know your test results and keep a list of the medicines you take. How can you care for yourself at home? · Set realistic goals. Many people expect to lose much more weight than is likely. A weight loss of 5% to 10% of your body weight may be enough to improve your health. · Get family and friends involved to provide support. Talk to them about why you are trying to lose weight, and ask them to help. They can help by participating in exercise and having meals with you, even if they may be eating something different. · Find what works best for you. If you do not have time or do not like to cook, a program that offers meal replacement bars or shakes may be better for you.  Or if you like to prepare meals, finding a plan that includes daily menus and recipes may be best.  · Ask your doctor about other health professionals who can help you achieve your weight loss goals. ? A dietitian can help you make healthy changes in your diet. ? An exercise specialist or  can help you develop a safe and effective exercise program.  ? A counselor or psychiatrist can help you cope with issues such as depression, anxiety, or family problems that can make it hard to focus on weight loss. · Consider joining a support group for people who are trying to lose weight. Your doctor can suggest groups in your area. Where can you learn more? Go to http://www.gray.com/  Enter U357 in the search box to learn more about \"Starting a Weight Loss Plan: Care Instructions. \"  Current as of: March 17, 2021               Content Version: 13.0  © 2006-2021 Healthwise, Incorporated. Care instructions adapted under license by [employer] (which disclaims liability or warranty for this information). If you have questions about a medical condition or this instruction, always ask your healthcare professional. [address] any warranty or liability for your use of this information.

## 2022-03-05 LAB
ALBUMIN SERPL-MCNC: 4 G/DL (ref 3.5–5)
ALBUMIN/GLOB SERPL: 1.1 {RATIO} (ref 1.1–2.2)
ALP SERPL-CCNC: 75 U/L (ref 45–117)
ALT SERPL-CCNC: 21 U/L (ref 12–78)
ANION GAP SERPL CALC-SCNC: 4 MMOL/L (ref 5–15)
AST SERPL-CCNC: 15 U/L (ref 15–37)
BILIRUB SERPL-MCNC: 1 MG/DL (ref 0.2–1)
BUN SERPL-MCNC: 10 MG/DL (ref 6–20)
BUN/CREAT SERPL: 14 (ref 12–20)
CALCIUM SERPL-MCNC: 9.8 MG/DL (ref 8.5–10.1)
CHLORIDE SERPL-SCNC: 108 MMOL/L (ref 97–108)
CHOLEST SERPL-MCNC: 200 MG/DL
CO2 SERPL-SCNC: 26 MMOL/L (ref 21–32)
CREAT SERPL-MCNC: 0.69 MG/DL (ref 0.55–1.02)
ERYTHROCYTE [DISTWIDTH] IN BLOOD BY AUTOMATED COUNT: 12.9 % (ref 11.5–14.5)
EST. AVERAGE GLUCOSE BLD GHB EST-MCNC: 105 MG/DL
FSH SERPL-ACNC: 5.2 MIU/ML
GLOBULIN SER CALC-MCNC: 3.5 G/DL (ref 2–4)
GLUCOSE SERPL-MCNC: 101 MG/DL (ref 65–100)
HBA1C MFR BLD: 5.3 % (ref 4–5.6)
HCT VFR BLD AUTO: 41.8 % (ref 35–47)
HCV AB SERPL QL IA: NONREACTIVE
HDLC SERPL-MCNC: 50 MG/DL
HDLC SERPL: 4 {RATIO} (ref 0–5)
HGB BLD-MCNC: 13.3 G/DL (ref 11.5–16)
LDLC SERPL CALC-MCNC: 127.4 MG/DL (ref 0–100)
LH SERPL-ACNC: 1.9 MIU/ML
MCH RBC QN AUTO: 27.7 PG (ref 26–34)
MCHC RBC AUTO-ENTMCNC: 31.8 G/DL (ref 30–36.5)
MCV RBC AUTO: 86.9 FL (ref 80–99)
NRBC # BLD: 0 K/UL (ref 0–0.01)
NRBC BLD-RTO: 0 PER 100 WBC
PLATELET # BLD AUTO: 397 K/UL (ref 150–400)
PMV BLD AUTO: 10.5 FL (ref 8.9–12.9)
POTASSIUM SERPL-SCNC: 4.1 MMOL/L (ref 3.5–5.1)
PROLACTIN SERPL-MCNC: 39.9 NG/ML
PROT SERPL-MCNC: 7.5 G/DL (ref 6.4–8.2)
RBC # BLD AUTO: 4.81 M/UL (ref 3.8–5.2)
SODIUM SERPL-SCNC: 138 MMOL/L (ref 136–145)
TRIGL SERPL-MCNC: 113 MG/DL (ref ?–150)
TSH SERPL DL<=0.05 MIU/L-ACNC: 2.08 UIU/ML (ref 0.36–3.74)
VLDLC SERPL CALC-MCNC: 22.6 MG/DL
WBC # BLD AUTO: 5 K/UL (ref 3.6–11)

## 2022-03-10 NOTE — PROGRESS NOTES
HCV NR. TC borderline elevated at 200, otherwise lipid panel wnl. TSH wnl. FSH/LH wnl. CBC wnl. CMP wnl. A1c 5.3%. Prolactin mildly elevated, will repeat at next appt.

## 2022-03-15 ENCOUNTER — OFFICE VISIT (OUTPATIENT)
Dept: SLEEP MEDICINE | Age: 37
End: 2022-03-15

## 2022-03-15 VITALS
OXYGEN SATURATION: 96 % | SYSTOLIC BLOOD PRESSURE: 124 MMHG | HEIGHT: 64 IN | WEIGHT: 263 LBS | HEART RATE: 67 BPM | BODY MASS INDEX: 44.9 KG/M2 | DIASTOLIC BLOOD PRESSURE: 94 MMHG

## 2022-03-15 DIAGNOSIS — E66.01 MORBID OBESITY WITH BMI OF 45.0-49.9, ADULT (HCC): ICD-10-CM

## 2022-03-15 DIAGNOSIS — G47.33 OSA (OBSTRUCTIVE SLEEP APNEA): Primary | ICD-10-CM

## 2022-03-15 PROCEDURE — 99204 OFFICE O/P NEW MOD 45 MIN: CPT | Performed by: SPECIALIST

## 2022-03-15 PROCEDURE — G8417 CALC BMI ABV UP PARAM F/U: HCPCS | Performed by: SPECIALIST

## 2022-03-15 PROCEDURE — G8427 DOCREV CUR MEDS BY ELIG CLIN: HCPCS | Performed by: SPECIALIST

## 2022-03-15 PROCEDURE — G9717 DOC PT DX DEP/BP F/U NT REQ: HCPCS | Performed by: SPECIALIST

## 2022-03-15 NOTE — PROGRESS NOTES
[address] [place]. [place], [address]  Tel.  [phone]  Fax. [address] [facility] [address]  [place], [address] [facility]  Tel.  [phone]  Fax. [phone] [address] [facility] Imelda Vazquez   Tel.  [phone]  Fax. [phone]       Chief Complaint       Chief Complaint   Patient presents with    Sleep Problem     NP; ref Dr Renan Ruvalcaba; nolberto MARCOS      Corwin Garcia is 39 y.o. female seen for evaluation of a sleep disorder. Patient signed release for her daughter to provide Green[place]ic interpretation. Patient normally retires at 9 PM and may get a bed at 4 AM.  She will awaken to this 3 times during the night. The reports a history of snoring described as loud, heard through closed doors, in separate rooms and separate floors. Patient describes her self as tired on awakening and during the day. Does not fall asleep inappropriately. Notes vivid dreaming/nightmares and sleep talking. Denies sleepwalking, nocturnal incontinence, abnormal arm or leg movements, hypnagogic hallucinations, sleep paralysis or cataplexy. The patient has not undergone diagnostic testing for the current problems. [place] Sleepiness Score: 4       No Known Allergies    Current Outpatient Medications   Medication Sig Dispense Refill    Invega Sustenna 234 mg/1.5 mL injection INJECT 1 SYRINGE SUBCUTANEOUSLY AS DIRECTED EVERY 3 WEEKS      lamoTRIgine (LaMICtal) 100 mg tablet Take 100 mg by mouth nightly.  melatonin 5 mg tablet Take 5 mg by mouth nightly.  topiramate (TOPAMAX) 100 mg tablet Take 1 Tab by mouth nightly.  Indications: migraine prevention 30 Tab 0    hydrOXYzine HCL (ATARAX) 50 mg tablet TAKE 1 TABLET BY MOUTH AT BEDTIME AS NEEDED FOR SLEEP DO NOT DRIVE IF SEDATED (Patient not taking: Reported on 3/15/2022)          She  has a past medical history of Calculus of kidney, Depression (1/5/2021), H/O headache (7/8/2013), Headache(784.0), Kidney disease, Migraines, and Pseudotumor cerebri syndrome (7/8/2013). She has no past medical history of Anemia, Asthma, Chlamydia, Complication of anesthesia, Diabetes ([facility] [address] Genital herpes, unspecified, Gonorrhea, Heart abnormality, Herpes gestationis, Herpes simplex without mention of complication, Human immunodeficiency virus (HIV) disease ([facility] [address] Infertility, female, Liver disease, Phlebitis and thrombophlebitis of unspecified site, Postpartum depression, Rhesus isoimmunization unspecified as to episode of care in pregnancy, Sickle-cell disease, unspecified, Syphilis, Systemic lupus erythematosus ([facility] Thyroid activity decreased, Trauma, Unspecified breast disorder, Unspecified diseases of blood and blood-forming organs, or Unspecified epilepsy without mention of intractable epilepsy. She  has a past surgical history that includes emg limited (2/12/2016). She family history includes Diabetes in her father and mother; Hypertension in her father and mother. She  reports that she has never smoked. She has never used smokeless tobacco. She reports that she does not drink alcohol and does not use drugs. Review of Systems:  Review of Systems   Constitutional: Negative for chills and fever. HENT: Negative for hearing loss and tinnitus. Eyes: Negative for blurred vision and double vision. Respiratory: Positive for cough and shortness of breath. Negative for wheezing. Cardiovascular: Negative for chest pain and palpitations. Gastrointestinal: Positive for heartburn. Negative for nausea and vomiting. Genitourinary: Negative for frequency and urgency. Musculoskeletal: Negative for back pain, joint pain and neck pain. Neurological: Positive for dizziness, tingling and headaches. Psychiatric/Behavioral: Negative for depression. The patient is nervous/anxious.           Objective:     Visit Vitals  BP (!) 124/94   Pulse 67   Ht 5' 4\" (1.626 m)   Wt 263 lb (119.3 kg)   LMP 02/22/2022 (Exact Date) SpO2 96%   BMI 45.14 kg/m²     Body mass index is 45.14 kg/m². General:   Conversant, cooperative   Eyes:  Pupils equal and reactive, no nystagmus   Oropharynx:   Mallampati score III, tongue normal, narrow posterior oral airway       Neck:   No carotid bruits; Neck circ. in \"inches\": 15   Chest/Lungs:  Clear on auscultation    CVS:  Normal rate, regular rhythm   Skin:  Warm to touch; no obvious rashes   Neuro: face symmetrical, tongue movement normal   Psych:  Normal affect,  normal countenance        Assessment:       ICD-10-CM ICD-9-CM    1. CHANDA (obstructive sleep apnea)  G47.33 327.23    2. Morbid obesity with BMI of 45.0-49.9, adult ([facility]  E66.01 278.01     Z68.42 V85.42      History consistent with sleep disordered breathing. Patient will be evaluated with a home sleep test.  Patient would benefit from weight reduction. She was advised that weight loss measures often less effective if sleep disordered breathing not concurrently treated. Plan:     No orders of the defined types were placed in this encounter. * Patient has a history and examination consistent with the diagnosis of sleep apnea. *Home sleep testing was ordered for initial evaluation. * She was provided information on sleep apnea including corresponding risk factors and the importance of proper treatment. * Treatment options if indicated were reviewed today. Instructions:  o The patient would benefit from weight reduction measures. o Do not engage in activities requiring a normal degree of alertness if fatigue is present. o The patient understands that untreated or undertreated sleep apnea could impair judgement and the ability to function normally during the day.  o Call or return if symptoms worsen or persist.          Mary Kate Desouza MD, FAASM  Electronically signed 03/15/22       This note was created using voice recognition software. Despite editing, there may be syntax errors.   This note will not be viewable in [address]

## 2022-03-19 PROBLEM — T50.901A DRUG OVERDOSE: Status: ACTIVE | Noted: 2021-01-05

## 2022-03-20 PROBLEM — F20.9 SCHIZOPHRENIA (HCC): Status: ACTIVE | Noted: 2021-01-08

## 2022-03-20 PROBLEM — F32.A DEPRESSION: Status: ACTIVE | Noted: 2021-01-05

## 2022-03-25 ENCOUNTER — OFFICE VISIT (OUTPATIENT)
Dept: FAMILY MEDICINE CLINIC | Age: 37
End: 2022-03-25
Payer: MEDICARE

## 2022-03-25 ENCOUNTER — APPOINTMENT (OUTPATIENT)
Dept: GENERAL RADIOLOGY | Age: 37
End: 2022-03-25
Attending: EMERGENCY MEDICINE
Payer: MEDICARE

## 2022-03-25 ENCOUNTER — HOSPITAL ENCOUNTER (EMERGENCY)
Age: 37
Discharge: HOME | End: 2022-03-25
Attending: EMERGENCY MEDICINE
Payer: MEDICARE

## 2022-03-25 ENCOUNTER — APPOINTMENT (OUTPATIENT)
Dept: CT IMAGING | Age: 37
End: 2022-03-25
Attending: EMERGENCY MEDICINE
Payer: MEDICARE

## 2022-03-25 VITALS
SYSTOLIC BLOOD PRESSURE: 108 MMHG | BODY MASS INDEX: 45.24 KG/M2 | TEMPERATURE: 97.5 F | WEIGHT: 265 LBS | OXYGEN SATURATION: 99 % | HEIGHT: 64 IN | HEART RATE: 73 BPM | DIASTOLIC BLOOD PRESSURE: 75 MMHG | RESPIRATION RATE: 17 BRPM

## 2022-03-25 VITALS
DIASTOLIC BLOOD PRESSURE: 101 MMHG | OXYGEN SATURATION: 99 % | SYSTOLIC BLOOD PRESSURE: 151 MMHG | TEMPERATURE: 97.5 F | HEART RATE: 73 BPM | RESPIRATION RATE: 18 BRPM

## 2022-03-25 DIAGNOSIS — G93.2 PSEUDOTUMOR CEREBRI: ICD-10-CM

## 2022-03-25 DIAGNOSIS — R51.9 ACUTE NONINTRACTABLE HEADACHE, UNSPECIFIED HEADACHE TYPE: Primary | ICD-10-CM

## 2022-03-25 DIAGNOSIS — R10.2 SUPRAPUBIC PAIN: ICD-10-CM

## 2022-03-25 DIAGNOSIS — R79.89 ELEVATED PROLACTIN LEVEL: ICD-10-CM

## 2022-03-25 DIAGNOSIS — H53.9 VISION CHANGES: ICD-10-CM

## 2022-03-25 DIAGNOSIS — G93.2 IDIOPATHIC INTRACRANIAL HYPERTENSION: Primary | ICD-10-CM

## 2022-03-25 DIAGNOSIS — G43.109 CHRONIC MIGRAINE WITH AURA: ICD-10-CM

## 2022-03-25 DIAGNOSIS — R06.83 SNORING: ICD-10-CM

## 2022-03-25 LAB
ALBUMIN SERPL-MCNC: 3.9 G/DL (ref 3.5–5)
ALBUMIN/GLOB SERPL: 1 {RATIO} (ref 1.1–2.2)
ALP SERPL-CCNC: 70 U/L (ref 45–117)
ALT SERPL-CCNC: 24 U/L (ref 12–78)
ANION GAP SERPL CALC-SCNC: 4 MMOL/L (ref 5–15)
APPEARANCE CSF: ABNORMAL
APPEARANCE CSF: ABNORMAL
AST SERPL-CCNC: 12 U/L (ref 15–37)
BASOPHILS # BLD: 0 K/UL (ref 0–0.1)
BASOPHILS NFR BLD: 1 % (ref 0–1)
BILIRUB SERPL-MCNC: 1 MG/DL (ref 0.2–1)
BUN SERPL-MCNC: 8 MG/DL (ref 6–20)
BUN/CREAT SERPL: 13 (ref 12–20)
CALCIUM SERPL-MCNC: 9.2 MG/DL (ref 8.5–10.1)
CHLORIDE SERPL-SCNC: 108 MMOL/L (ref 97–108)
CO2 SERPL-SCNC: 24 MMOL/L (ref 21–32)
COLOR CSF: ABNORMAL
COLOR CSF: ABNORMAL
COLOR SPUN CSF: CLEAR
COLOR SPUN CSF: CLEAR
COMMENT, HOLDF: NORMAL
CREAT SERPL-MCNC: 0.62 MG/DL (ref 0.55–1.02)
CRP SERPL-MCNC: 1.44 MG/DL (ref 0–0.6)
DIFFERENTIAL METHOD BLD: ABNORMAL
EOSINOPHIL # BLD: 0.1 K/UL (ref 0–0.4)
EOSINOPHIL NFR BLD: 2 % (ref 0–7)
ERYTHROCYTE [DISTWIDTH] IN BLOOD BY AUTOMATED COUNT: 12.7 % (ref 11.5–14.5)
ERYTHROCYTE [SEDIMENTATION RATE] IN BLOOD: 33 MM/HR (ref 0–20)
GLOBULIN SER CALC-MCNC: 3.8 G/DL (ref 2–4)
GLUCOSE CSF-MCNC: 61 MG/DL (ref 40–70)
GLUCOSE SERPL-MCNC: 86 MG/DL (ref 65–100)
HCT VFR BLD AUTO: 41 % (ref 35–47)
HGB BLD-MCNC: 13.6 G/DL (ref 11.5–16)
IMM GRANULOCYTES # BLD AUTO: 0.1 K/UL (ref 0–0.04)
IMM GRANULOCYTES NFR BLD AUTO: 1 % (ref 0–0.5)
INR PPP: 1.1 (ref 0.9–1.1)
LYMPHOCYTES # BLD: 1.9 K/UL (ref 0.8–3.5)
LYMPHOCYTES NFR BLD: 39 % (ref 12–49)
MCH RBC QN AUTO: 28.2 PG (ref 26–34)
MCHC RBC AUTO-ENTMCNC: 33.2 G/DL (ref 30–36.5)
MCV RBC AUTO: 85.1 FL (ref 80–99)
MONOCYTES # BLD: 0.3 K/UL (ref 0–1)
MONOCYTES NFR BLD: 7 % (ref 5–13)
NEUTS SEG # BLD: 2.5 K/UL (ref 1.8–8)
NEUTS SEG NFR BLD: 50 % (ref 32–75)
NRBC # BLD: 0 K/UL (ref 0–0.01)
NRBC BLD-RTO: 0 PER 100 WBC
PLATELET # BLD AUTO: 365 K/UL (ref 150–400)
PMV BLD AUTO: 10.1 FL (ref 8.9–12.9)
POTASSIUM SERPL-SCNC: 3.9 MMOL/L (ref 3.5–5.1)
PROT CSF-MCNC: 53 MG/DL (ref 15–45)
PROT SERPL-MCNC: 7.7 G/DL (ref 6.4–8.2)
PROTHROMBIN TIME: 11 SEC (ref 9–11.1)
RBC # BLD AUTO: 4.82 M/UL (ref 3.8–5.2)
RBC # CSF: 7000 /CU MM
RBC # CSF: 9500 /CU MM
SAMPLES BEING HELD,HOLD: NORMAL
SODIUM SERPL-SCNC: 136 MMOL/L (ref 136–145)
TUBE # CSF: 1
TUBE # CSF: 2
TUBE # CSF: 2
TUBE # CSF: 4
WBC # BLD AUTO: 4.9 K/UL (ref 3.6–11)
WBC # CSF: 5 /CU MM (ref 0–5)
WBC # CSF: 8 /CU MM (ref 0–5)

## 2022-03-25 PROCEDURE — 84157 ASSAY OF PROTEIN OTHER: CPT

## 2022-03-25 PROCEDURE — G8427 DOCREV CUR MEDS BY ELIG CLIN: HCPCS | Performed by: STUDENT IN AN ORGANIZED HEALTH CARE EDUCATION/TRAINING PROGRAM

## 2022-03-25 PROCEDURE — 96372 THER/PROPH/DIAG INJ SC/IM: CPT

## 2022-03-25 PROCEDURE — 85610 PROTHROMBIN TIME: CPT

## 2022-03-25 PROCEDURE — 80053 COMPREHEN METABOLIC PANEL: CPT

## 2022-03-25 PROCEDURE — G0463 HOSPITAL OUTPT CLINIC VISIT: HCPCS | Performed by: STUDENT IN AN ORGANIZED HEALTH CARE EDUCATION/TRAINING PROGRAM

## 2022-03-25 PROCEDURE — 99214 OFFICE O/P EST MOD 30 MIN: CPT | Performed by: STUDENT IN AN ORGANIZED HEALTH CARE EDUCATION/TRAINING PROGRAM

## 2022-03-25 PROCEDURE — 74011000250 HC RX REV CODE- 250: Performed by: EMERGENCY MEDICINE

## 2022-03-25 PROCEDURE — 77030014143 XR SPINAL PUNC LUMB DX

## 2022-03-25 PROCEDURE — G9717 DOC PT DX DEP/BP F/U NT REQ: HCPCS | Performed by: STUDENT IN AN ORGANIZED HEALTH CARE EDUCATION/TRAINING PROGRAM

## 2022-03-25 PROCEDURE — 89050 BODY FLUID CELL COUNT: CPT

## 2022-03-25 PROCEDURE — 99284 EMERGENCY DEPT VISIT MOD MDM: CPT

## 2022-03-25 PROCEDURE — 87205 SMEAR GRAM STAIN: CPT

## 2022-03-25 PROCEDURE — 86140 C-REACTIVE PROTEIN: CPT

## 2022-03-25 PROCEDURE — 85652 RBC SED RATE AUTOMATED: CPT

## 2022-03-25 PROCEDURE — 82945 GLUCOSE OTHER FLUID: CPT

## 2022-03-25 PROCEDURE — 36415 COLL VENOUS BLD VENIPUNCTURE: CPT

## 2022-03-25 PROCEDURE — G8417 CALC BMI ABV UP PARAM F/U: HCPCS | Performed by: STUDENT IN AN ORGANIZED HEALTH CARE EDUCATION/TRAINING PROGRAM

## 2022-03-25 PROCEDURE — 74011250637 HC RX REV CODE- 250/637: Performed by: EMERGENCY MEDICINE

## 2022-03-25 PROCEDURE — 85025 COMPLETE CBC W/AUTO DIFF WBC: CPT

## 2022-03-25 PROCEDURE — 70450 CT HEAD/BRAIN W/O DYE: CPT

## 2022-03-25 RX ORDER — IBUPROFEN 200 MG
CAPSULE ORAL
COMMUNITY
End: 2022-07-07

## 2022-03-25 RX ORDER — SODIUM BICARBONATE 42 MG/ML
2 INJECTION, SOLUTION INTRAVENOUS
Status: DISCONTINUED | OUTPATIENT
Start: 2022-03-25 | End: 2022-03-25

## 2022-03-25 RX ORDER — TETRACAINE HYDROCHLORIDE 5 MG/ML
1 SOLUTION OPHTHALMIC
Status: COMPLETED | OUTPATIENT
Start: 2022-03-25 | End: 2022-03-25

## 2022-03-25 RX ORDER — LIDOCAINE HYDROCHLORIDE 20 MG/ML
2 INJECTION, SOLUTION EPIDURAL; INFILTRATION; INTRACAUDAL; PERINEURAL
Status: DISCONTINUED | OUTPATIENT
Start: 2022-03-25 | End: 2022-03-25 | Stop reason: HOSPADM

## 2022-03-25 RX ORDER — ACETAMINOPHEN 500 MG
1000 TABLET ORAL
Status: COMPLETED | OUTPATIENT
Start: 2022-03-25 | End: 2022-03-25

## 2022-03-25 RX ORDER — SODIUM BICARBONATE 42 MG/ML
1 INJECTION, SOLUTION INTRAVENOUS
Status: COMPLETED | OUTPATIENT
Start: 2022-03-25 | End: 2022-03-25

## 2022-03-25 RX ADMIN — TETRACAINE HYDROCHLORIDE 1 DROP: 5 SOLUTION OPHTHALMIC at 13:23

## 2022-03-25 RX ADMIN — SODIUM BICARBONATE 42 MG: 42 INJECTION, SOLUTION INTRAVENOUS at 14:11

## 2022-03-25 RX ADMIN — ACETAMINOPHEN 1000 MG: 500 TABLET ORAL at 18:11

## 2022-03-25 NOTE — ED PROVIDER NOTES
Patient is a 44-year-old woman with a history of benign intracranial hypertension that improved since giving birth in 2016. Today she was sent from her doctor's office for increased pain and pressure behind her eyes associated with blurred vision. She has had the symptoms over the course of the last week and occasionally has headaches that are worse when she lays flat and improved when she stands and walks. The blurred vision affects both eyes and she denies any trauma, itching, foreign body, and discharge. The history is provided by the patient, a relative and medical records. Past Medical History:   Diagnosis Date    Calculus of kidney     Depression 1/5/2021    H/O headache 7/8/2013    Headache(784.0)     Kidney disease     Migraines     Pseudotumor cerebri syndrome 7/8/2013       Past Surgical History:   Procedure Laterality Date    EMG LIMITED  2/12/2016         Family History:   Problem Relation Age of Onset    Diabetes Mother     Hypertension Mother     Diabetes Father     Hypertension Father     Cancer Neg Hx        Social History     Socioeconomic History    Marital status:      Spouse name: Not on file    Number of children: Not on file    Years of education: Not on file    Highest education level: Not on file   Occupational History    Not on file   Tobacco Use    Smoking status: Never Smoker    Smokeless tobacco: Never Used   Substance and Sexual Activity    Alcohol use: No    Drug use: No    Sexual activity: Yes     Partners: Male     Birth control/protection: I.U.D.    Other Topics Concern     Service Not Asked    Blood Transfusions Not Asked    Caffeine Concern Not Asked    Occupational Exposure Not Asked    Hobby Hazards Not Asked    Sleep Concern Not Asked    Stress Concern Not Asked    Weight Concern Not Asked    Special Diet Not Asked    Back Care Not Asked    Exercise Not Asked    Bike Helmet Not Asked   [address] Not Asked    Self-Exams Not Asked   Social History Narrative    Home with children 3 healthy     Social Determinants of Health     Financial Resource Strain:     Difficulty of Paying Living Expenses: Not on file   Food Insecurity:     Worried About Running Out of Food in the Last Year: Not on file    Alber of Food in the Last Year: Not on file   Transportation Needs:     Lack of Transportation (Medical): Not on file    Lack of Transportation (Non-Medical): Not on file   Physical Activity:     Days of Exercise per Week: Not on file    Minutes of Exercise per Session: Not on file   Stress:     Feeling of Stress : Not on file   Social Connections:     Frequency of Communication with Friends and Family: Not on file    Frequency of Social Gatherings with Friends and Family: Not on file    Attends Catholic Services: Not on file    Active Member of [address] [place] [address] or Organizations: Not on file    Attends Club or Organization Meetings: Not on file    Marital Status: Not on file   Intimate Partner Violence:     Fear of Current or Ex-Partner: Not on file    Emotionally Abused: Not on file    Physically Abused: Not on file    Sexually Abused: Not on file   Housing Stability:     Unable to Pay for Housing in the Last Year: Not on file    Number of [place] in the Last Year: Not on file    Unstable Housing in the Last Year: Not on file         ALLERGIES: Patient has no known allergies. Review of Systems   Constitutional: Negative for chills and fever. Eyes: Positive for blurred vision, pain and visual disturbance. Negative for photophobia, discharge, redness and itching. Respiratory: Negative for shortness of breath. Cardiovascular: Negative for chest pain. Gastrointestinal: Negative for nausea and vomiting. Musculoskeletal: Negative for back pain and neck pain. Neurological: Positive for headaches. Negative for dizziness, weakness and light-headedness.    All other systems reviewed and are negative. Vitals:    03/25/22 1154   BP: (!) 151/101   Pulse: 73   Resp: 18   Temp: 97.5 °F (36.4 °C)   SpO2: 99%            Physical Exam  Vitals and nursing note reviewed. Constitutional:       Appearance: She is well-developed. HENT:      Head: Normocephalic and atraumatic. Eyes:      General: Lids are normal. Gaze aligned appropriately. No scleral icterus. Right eye: No foreign body. Left eye: No foreign body. Intraocular pressure: Right eye pressure is 19 mmHg. Left eye pressure is 13 mmHg. Measurements were taken using a handheld tonometer. Extraocular Movements: Extraocular movements intact. Conjunctiva/sclera: Conjunctivae normal.      Right eye: Right conjunctiva is not injected. No exudate. Left eye: Left conjunctiva is not injected. No exudate. Pupils: Pupils are equal, round, and reactive to light. Cardiovascular:      Rate and Rhythm: Normal rate and regular rhythm. Pulmonary:      Effort: Pulmonary effort is normal.      Breath sounds: Normal breath sounds. Abdominal:      General: There is no distension. Palpations: Abdomen is soft. Tenderness: There is no abdominal tenderness. Musculoskeletal:      Cervical back: Normal range of motion and neck supple. Skin:     General: Skin is warm and dry. Capillary Refill: Capillary refill takes less than 2 seconds. Neurological:      General: No focal deficit present. Mental Status: She is alert and oriented to person, place, and time. Psychiatric:         Mood and Affect: Mood normal.         Behavior: Behavior normal.          [facility]         Bedside     Date/Time: 3/25/2022 1:41 PM  Performed by: Willam Palmer MD  Authorized by: Willam Palmer MD     Verbal consent obtained: Yes    Given by:  Patient  Performed by: Attending  Type of procedure: Focused ocular  Left leg:      Indications:  Eye pain    Right eye transverse:  Adequate    Right eye longitudinal:  Adequate Left eye transverse:  Adequate    Left eye longitudinal:  Adequate  Right eye:     Retinal contour:  Normal    Vitreous body:  Anechoic  Left eye:     Retinal contour:  Normal    Left vitreous body:  Anechoic    Interpretation:  No acute abnormalities identified    Diameter of optic nerve 3 mm      Opening pressure 30 cm and 12 ml of CSF drained. The patient presented to the emergency department with a headache and a workup consistent with Idiopathic intracranial hypertension. The patient is now resting comfortably and feels better, is alert, talkative, interactive and in no distress. The patient appears well and is able to tolerate PO fluids. The repeat examination is unremarkable and benign. The patient is neurologically intact, has a normal mental status, and is ambulatory in the ED. The history, exam, diagnostic testing (if any) and the patient's current condition do not suggest meningitis, stroke, sepsis, subarachnoid hemorrhage, intracranial bleeding, encephalitis, temporal arteritis or other significant pathology to warrant further testing, continued ED treatment, admission, neurological consultation, or other specialist evaluation at this point. The vital signs have been stable. The patient's condition is stable and appropriate for discharge. The patient will pursue further outpatient evaluation with the primary care physician or other designated or consulting physician as indicated in the discharge instructions. LABORATORY TESTS:  Recent Results (from the past 12 hour(s))   SAMPLES BEING HELD    Collection Time: 03/25/22 12:34 PM   Result Value Ref Range    SAMPLES BEING HELD 1RED     COMMENT        Add-on orders for these samples will be processed based on acceptable specimen integrity and analyte stability, which may vary by analyte.    METABOLIC PANEL, COMPREHENSIVE    Collection Time: 03/25/22 12:34 PM   Result Value Ref Range    Sodium 136 136 - 145 mmol/L    Potassium 3.9 3.5 - 5.1 mmol/L Chloride 108 97 - 108 mmol/L    CO2 24 21 - 32 mmol/L    Anion gap 4 (L) 5 - 15 mmol/L    Glucose 86 65 - 100 mg/dL    BUN 8 6 - 20 MG/DL    Creatinine 0.62 0.55 - 1.02 MG/DL    BUN/Creatinine ratio 13 12 - 20      GFR est AA >60 >60 ml/min/1.73m2    GFR est non-AA >60 >60 ml/min/1.73m2    Calcium 9.2 8.5 - 10.1 MG/DL    Bilirubin, total 1.0 0.2 - 1.0 MG/DL    ALT (SGPT) 24 12 - 78 U/L    AST (SGOT) 12 (L) 15 - 37 U/L    Alk. phosphatase 70 45 - 117 U/L    Protein, total 7.7 6.4 - 8.2 g/dL    Albumin 3.9 3.5 - 5.0 g/dL    Globulin 3.8 2.0 - 4.0 g/dL    A-G Ratio 1.0 (L) 1.1 - 2.2     CBC WITH AUTOMATED DIFF    Collection Time: 03/25/22 12:34 PM   Result Value Ref Range    WBC 4.9 3.6 - 11.0 K/uL    RBC 4.82 3.80 - 5.20 M/uL    HGB 13.6 11.5 - 16.0 g/dL    HCT 41.0 35.0 - 47.0 %    MCV 85.1 80.0 - 99.0 FL    MCH 28.2 26.0 - 34.0 PG    MCHC 33.2 30.0 - 36.5 g/dL    RDW 12.7 11.5 - 14.5 %    PLATELET 724 513 - 130 K/uL    MPV 10.1 8.9 - 12.9 FL    NRBC 0.0 0  WBC    ABSOLUTE NRBC 0.00 0.00 - 0.01 K/uL    NEUTROPHILS 50 32 - 75 %    LYMPHOCYTES 39 12 - 49 %    MONOCYTES 7 5 - 13 %    EOSINOPHILS 2 0 - 7 %    BASOPHILS 1 0 - 1 %    IMMATURE GRANULOCYTES 1 (H) 0.0 - 0.5 %    ABS. NEUTROPHILS 2.5 1.8 - 8.0 K/UL    ABS. LYMPHOCYTES 1.9 0.8 - 3.5 K/UL    ABS. MONOCYTES 0.3 0.0 - 1.0 K/UL    ABS. EOSINOPHILS 0.1 0.0 - 0.4 K/UL    ABS. BASOPHILS 0.0 0.0 - 0.1 K/UL    ABS. IMM.  GRANS. 0.1 (H) 0.00 - 0.04 K/UL    DF AUTOMATED     SED RATE (ESR)    Collection Time: 03/25/22 12:34 PM   Result Value Ref Range    Sed rate, automated 33 (H) 0 - 20 mm/hr   C REACTIVE PROTEIN, QT    Collection Time: 03/25/22 12:34 PM   Result Value Ref Range    C-Reactive protein 1.44 (H) 0.00 - 0.60 mg/dL   PROTHROMBIN TIME + INR    Collection Time: 03/25/22 12:34 PM   Result Value Ref Range    INR 1.1 0.9 - 1.1      Prothrombin time 11.0 9.0 - 11.1 sec   CELL COUNT, CSF    Collection Time: 03/25/22  3:00 PM   Result Value Ref Range    CSF TUBE NO. 1      CSF COLOR PINK (A) COL      SPUN COLOR CLEAR (A) COL      CSF APPEARANCE HAZY (A) CLEAR      CSF RBCs 7,000 (H) 0 /cu mm    CSF WBCs 8 (H) 0 - 5 /cu mm   CULTURE, CSF W GRAM STAIN    Collection Time: 03/25/22  3:00 PM    Specimen: Cerebrospinal Fluid   Result Value Ref Range    Special Requests: NO SPECIAL REQUESTS      GRAM STAIN RARE WBCS SEEN      GRAM STAIN NO ORGANISMS SEEN      Culture result: PENDING    GLUCOSE, CSF    Collection Time: 03/25/22  3:00 PM   Result Value Ref Range    Tube No. 2      Glucose,CSF 61 40 - 70 MG/DL   PROTEIN, CSF    Collection Time: 03/25/22  3:00 PM   Result Value Ref Range    Tube No. 2      Protein,CSF 53 (H) 15 - 45 MG/DL   CELL COUNT, CSF    Collection Time: 03/25/22  3:00 PM   Result Value Ref Range    CSF TUBE NO. 4      CSF COLOR PINK (A) COL      SPUN COLOR CLEAR (A) COL      CSF APPEARANCE HAZY (A) CLEAR      CSF RBCs 9,500 (H) 0 /cu mm    CSF WBCs 5 0 - 5 /cu mm       IMAGING RESULTS:    MEDICATIONS GIVEN:  Medications   lidocaine (PF) (XYLOCAINE) 20 mg/mL (2 %) injection 40 mg (has no administration in time range)   acetaminophen (TYLENOL) tablet 1,000 mg (1,000 mg Oral Given 3/25/22 1811)   tetracaine HCl (PF) (PONTOCAINE) 0.5 % ophthalmic solution 1 Drop (1 Drop Both Eyes Given by Provider 3/25/22 1323)   sodium bicarbonate (4.2%) injection 42 mg (42 mg SubCUTAneous Given 3/25/22 1411)       IMPRESSION:  1.  Idiopathic intracranial hypertension

## 2022-03-25 NOTE — PROGRESS NOTES
Subjective   CC:  Sarath Duffy is a 39 y.o. female who presents for headache. States she has been having a severe frontal headache with nausea and vomiting for about a week now. She states she has been having some visual changes described as blurriness since then as well. Blurred vision has been worsening and is new for her. No hearing changes or pulsatile tinnitus. Also with some bilateral eye pain as well. No pain with chewing to suggest jaw claudication. Pain wakes her up from sleep. States she was previously taking Diurex pills (caffeine pills) and abruptly stopped them two days ago as she felt they worsened her headache. No focal weakness or numbness, no back pain. Has been taking Motrin with minimal relief of symptoms. Has not tried a triptan, which she was trialled on in the past, per pt report. States in past was diagnosed with migraines, which she feels symptoms are somewhat dissimilar from. She is unable to describe how so though. Also was being treated with Aimovig through neurology. She was seeing neurology (Dr. Kristie Drew) for pseudotumor cerebri and chronic migraine, though did not follow up as she was nervous about getting a brain MRI. In regards to weight loss, has gained 2 lbs. Has stopped her Diurex as mentioned above. Has been eating mainly soup, vegetables, and beans. However, notes she does eat half a loaf to a loaf of white bread each day. Would like to consider meds for weight loss as well. Saw Dr. Yayo Anderson for evaluation of CHANDA. Was recommended to undergo home sleep study. She is concerned that it will not  apneic episodes as she has intermittent snoring (some nights without any snoring). Also notes some urinary retention since she stopped Diurex. No dysuria , urinary urgency, or frequency. She reports mild suprapubic pain as well. No fevers or chills. Review of Systems   Constitutional: Negative for activity change, chills and fever.    HENT: Negative for congestion and sore throat. Eyes: Positive for photophobia, pain and visual disturbance. Negative for discharge, redness and itching. Respiratory: Negative for cough, chest tightness and shortness of breath. Cardiovascular: Negative for chest pain and leg swelling. Gastrointestinal: Negative for abdominal pain, constipation, diarrhea, nausea and vomiting. Genitourinary: Positive for difficulty urinating. Negative for dysuria, frequency and urgency. Musculoskeletal: Negative for myalgias. Skin: Negative for rash. Neurological: Positive for headaches. Negative for dizziness, facial asymmetry, speech difficulty, weakness, light-headedness and numbness. Past Medical History  Past Medical History:   Diagnosis Date    Calculus of kidney     Depression 1/5/2021    H/O headache 7/8/2013    Headache(784.0)     Kidney disease     Migraines     Pseudotumor cerebri syndrome 7/8/2013       Current Medications  Current Outpatient Medications   Medication Sig Dispense Refill    ibuprofen 200 mg cap Take  by mouth.  Invega Sustenna 234 mg/1.5 mL injection INJECT 1 SYRINGE SUBCUTANEOUSLY AS DIRECTED EVERY 3 WEEKS      hydrOXYzine HCL (ATARAX) 50 mg tablet TAKE 1 TABLET BY MOUTH AT BEDTIME AS NEEDED FOR SLEEP DO NOT DRIVE IF SEDATED      lamoTRIgine (LaMICtal) 100 mg tablet Take 100 mg by mouth nightly.  melatonin 5 mg tablet Take 5 mg by mouth nightly.  topiramate (TOPAMAX) 100 mg tablet Take 1 Tab by mouth nightly.  Indications: migraine prevention 30 Tab 0       Allergies  No Known Allergies    Social History   Social History     Socioeconomic History    Marital status:      Spouse name: Not on file    Number of children: Not on file    Years of education: Not on file    Highest education level: Not on file   Occupational History    Not on file   Tobacco Use    Smoking status: Never Smoker    Smokeless tobacco: Never Used   Substance and Sexual Activity    Alcohol use: No    Drug use: No    Sexual activity: Yes     Partners: Male     Birth control/protection: I.U.D. Other Topics Concern     Service Not Asked    Blood Transfusions Not Asked    Caffeine Concern Not Asked    Occupational Exposure Not Asked    Hobby Hazards Not Asked    Sleep Concern Not Asked    Stress Concern Not Asked    Weight Concern Not Asked    Special Diet Not Asked    Back Care Not Asked    Exercise Not Asked    Bike Helmet Not Asked   [address] Not Asked    Self-Exams Not Asked   Social History Narrative    Home with children 3 healthy     Social Determinants of Health     Financial Resource Strain:     Difficulty of Paying Living Expenses: Not on file   Food Insecurity:     Worried About Running Out of Food in the Last Year: Not on file    Alber of Food in the Last Year: Not on file   Transportation Needs:     Lack of Transportation (Medical): Not on file    Lack of Transportation (Non-Medical):  Not on file   Physical Activity:     Days of Exercise per Week: Not on file    Minutes of Exercise per Session: Not on file   Stress:     Feeling of Stress : Not on file   Social Connections:     Frequency of Communication with Friends and Family: Not on file    Frequency of Social Gatherings with Friends and Family: Not on file    Attends Muslim Services: Not on file    Active Member of [address] [place] [address] [employer] or Organizations: Not on file    Attends Club or Organization Meetings: Not on file    Marital Status: Not on file   Intimate Partner Violence:     Fear of Current or Ex-Partner: Not on file    Emotionally Abused: Not on file    Physically Abused: Not on file    Sexually Abused: Not on file   Housing Stability:     Unable to Pay for Housing in the Last Year: Not on file    Number of [place] in the Last Year: Not on file    Unstable Housing in the Last Year: Not on file       Family History  Family History   Problem Relation Age of Onset    Diabetes Mother    [facility] Hypertension Mother     Diabetes Father     Hypertension Father     Cancer Neg Hx        Objective   Vital Signs  Visit Vitals  /75   Pulse 73   Temp 97.5 °F (36.4 °C) (Temporal)   Resp 17   Ht 5' 4\" (1.626 m)   Wt 265 lb (120.2 kg)   SpO2 99%   BMI 45.49 kg/m²       Physical Examination  Physical Exam  Constitutional:       General: She is not in acute distress. Appearance: Normal appearance. She is obese. She is not toxic-appearing. HENT:      Head: Normocephalic and atraumatic. Comments: No temporal tenderness bilaterally, jaw claudication  Eyes:      General: Lids are normal. Vision grossly intact. No visual field deficit. Right eye: No foreign body. Left eye: No foreign body. Extraocular Movements:      Right eye: Normal extraocular motion and no nystagmus. Left eye: Normal extraocular motion and no nystagmus. Conjunctiva/sclera: Conjunctivae normal.   Cardiovascular:      Rate and Rhythm: Normal rate and regular rhythm. Pulmonary:      Effort: Pulmonary effort is normal.      Breath sounds: Normal breath sounds. Abdominal:      General: Abdomen is flat. Palpations: Abdomen is soft. Musculoskeletal:         General: Normal range of motion. Cervical back: Normal range of motion and neck supple. Skin:     General: Skin is warm. Neurological:      General: No focal deficit present. Mental Status: She is alert and oriented to person, place, and time. Mental status is at baseline. Cranial Nerves: No cranial nerve deficit or facial asymmetry. Sensory: No sensory deficit. Motor: No weakness or abnormal muscle tone. Coordination: Coordination normal.      Gait: Gait normal.      Deep Tendon Reflexes: Reflexes normal.       Right 20/100  Left 20/140  Bilateral 20/80    Assessment and Plan   Meño Nelson is a 39 y.o. who is here for headache and follow up of weight loss.     1. Acute nonintractable headache, unspecified headache type  No focal deficits, though given nausea, vomiting, and new vision changes, concerning for arteritis vs glaucoma vs migraine with aura vs tension headache vs progession/worsening of IIH given past history. Given vision changes, will send patient to ED for further w/u to rule out acute angle glaucoma and temporal arteritis. Also discussed extensively the need for close follow up with neurology and need for brain MRI. Pt and her daughter understanding of this plan and stated they will be going to [facility] for further workup    2. Vision changes  As described above, blurred vision with notable deficits of visual acuity. EOMI, visual fields intact. However, given concurrent eye pain, cannot rule out acute angle closure glaucoma and arteritis. - VISUAL ACUITY CHECK    3. Snoring  F/u with sleep medicine. 4. Pseudotumor cerebri  F/u with neurology. 5. Chronic migraine with aura  F/u with neuro. 6. Suprapubic pain  Recommend UA and CBC. 7. Elevated prolactin level  Likely 2/2 Invega given not significantly elevated and no visual field deficits. 8. Difficulty urinating  Would recommend getting UA in ED. No dysuria, urinary urgency, or frequency to suggest UTI, but would consider ruling out. CVAT negative. Unfortunately unable to obtain at this visit as pt recommended to present to ED as soon as able. RTC 4/8 with [facility] (Dr. Tiffany Ochoa)    Patient is counseled to return to the office if symptoms do not improve as expected. Urgent consultation with the nearest Emergency Department is strongly recommended if condition worsens. Patient is counseled to follow up as recommended and to inform the office if any changes in treatment are recommended.       I discussed this patient with Dr. Noreen Donato (Attending Physician)       Signed By:  Army Raj MD  Family Medicine Resident

## 2022-03-25 NOTE — PROGRESS NOTES
Chief Complaint   Patient presents with    Weight Loss    Headache     times one week     1. Have you been to the ER, urgent care clinic since your last visit? Hospitalized since your last visit? No    2. Have you seen or consulted any other health care providers outside of the [address] [place] [address] since your last visit? Include any pap smears or colon screening.  No

## 2022-03-25 NOTE — PATIENT INSTRUCTIONS
PLEASE GO TO [facility]'S EMERGENCY ROOM. - We would like additional testing to be done for your eyes - including checking pressure in your eye and rule out a type of inflammation in your eye blood vessels, which can be checked with blood work. Learning About the [address] Diet  What is the Mediterranean diet? The Mediterranean diet is a style of eating rather than a diet plan. It features foods eaten in [place], [place], [place] and [place], and other countries along the [facility]. It emphasizes eating foods like fish, fruits, vegetables, beans, high-fiber breads and whole grains, nuts, and olive oil. This style of eating includes limited red meat, cheese, and sweets. Why choose the Mediterranean diet? A Mediterranean-style diet may improve heart health. It contains more fat than other heart-healthy diets. But the fats are mainly from nuts, unsaturated oils (such as fish oils and olive oil), and certain nut or seed oils (such as canola, soybean, or flaxseed oil). These fats may help protect the heart and blood vessels. How can you get started on the Mediterranean diet? Here are some things you can do to switch to a more Mediterranean way of eating. What to eat  · Eat a variety of fruits and vegetables each day, such as grapes, blueberries, tomatoes, broccoli, peppers, figs, olives, spinach, eggplant, beans, lentils, and chickpeas. · Eat a variety of whole-grain foods each day, such as oats, brown rice, and whole wheat bread, pasta, and couscous. · Eat fish at least 2 times a week. Try tuna, salmon, mackerel, lake trout, herring, or sardines. · Eat moderate amounts of low-fat dairy products, such as milk, cheese, or yogurt. · Eat moderate amounts of poultry and eggs. · Choose healthy (unsaturated) fats, such as nuts, olive oil, and certain nut or seed oils like canola, soybean, and flaxseed. · Limit unhealthy (saturated) fats, such as butter, palm oil, and coconut oil.  And limit fats found in animal products, such as meat and dairy products made with whole milk. Try to eat red meat only a few times a month in very small amounts. · Limit sweets and desserts to only a few times a week. This includes sugar-sweetened drinks like soda. The Mediterranean diet may also include red wine with your meal1 glass each day for women and up to 2 glasses a day for men. Tips for eating at home  · Use herbs, spices, garlic, lemon zest, and citrus juice instead of salt to add flavor to foods. · Add avocado slices to your sandwich instead of barone. · Have fish for lunch or dinner instead of red meat. Brush the fish with olive oil, and broil or grill it. · Sprinkle your salad with seeds or nuts instead of cheese. · Cook with olive or canola oil instead of butter or oils that are high in saturated fat. · Switch from 2% milk or whole milk to 1% or fat-free milk. · Dip raw vegetables in a vinaigrette dressing or hummus instead of dips made from mayonnaise or sour cream.  · Have a piece of fruit for dessert instead of a piece of cake. Try baked apples, or have some dried fruit. Tips for eating out  · Try broiled, grilled, baked, or poached fish instead of having it fried or breaded. · Ask your  to have your meals prepared with olive oil instead of butter. · Order dishes made with marinara sauce or sauces made from olive oil. Avoid sauces made from cream or mayonnaise. · Choose whole-grain breads, whole wheat pasta and pizza crust, brown rice, beans, and lentils. · Cut back on butter or margarine on bread. Instead, you can dip your bread in a small amount of olive oil. · Ask for a side salad or grilled vegetables instead of french fries or chips. Where can you learn more? Go to http://www.Project Airplane.com/  Enter O407 in the search box to learn more about \"Learning About the Mediterranean Diet. \"  Current as of: September 8, 2021               Content Version: 13.2  © 4147-0093 Healthwise, Incorporated. Care instructions adapted under license by [employer] (which disclaims liability or warranty for this information). If you have questions about a medical condition or this instruction, always ask your healthcare professional. [address] any warranty or liability for your use of this information. Starting a Weight Loss Plan: Care Instructions  Overview     If you're thinking about losing weight, it can be hard to know where to start. Your doctor can help you set up a weight loss plan that best meets your needs. You may want to take a class on nutrition or exercise, or you could join a weight loss support group. If you have questions about how to make changes to your eating or exercise habits, ask your doctor about seeing a registered dietitian or an exercise specialist.  It can be a big challenge to lose weight. But you don't have to make huge changes at once. Make small changes, and stick with them. When those changes become habit, add a few more changes. If you don't think you're ready to make changes right now, try to pick a date in the future. Make an appointment to see your doctor to discuss whether the time is right for you to start a plan. Follow-up care is a key part of your treatment and safety. Be sure to make and go to all appointments, and call your doctor if you are having problems. It's also a good idea to know your test results and keep a list of the medicines you take. How can you care for yourself at home? · Set realistic goals. Many people expect to lose much more weight than is likely. A weight loss of 5% to 10% of your body weight may be enough to improve your health. · Get family and friends involved to provide support. Talk to them about why you are trying to lose weight, and ask them to help. They can help by participating in exercise and having meals with you, even if they may be eating something different.   · Find what works best for you. If you do not have time or do not like to cook, a program that offers meal replacement bars or shakes may be better for you. Or if you like to prepare meals, finding a plan that includes daily menus and recipes may be best.  · Ask your doctor about other health professionals who can help you achieve your weight loss goals. ? A dietitian can help you make healthy changes in your diet. ? An exercise specialist or  can help you develop a safe and effective exercise program.  ? A counselor or psychiatrist can help you cope with issues such as depression, anxiety, or family problems that can make it hard to focus on weight loss. · Consider joining a support group for people who are trying to lose weight. Your doctor can suggest groups in your area. Where can you learn more? Go to http://www.gray.com/  Enter U357 in the search box to learn more about \"Starting a Weight Loss Plan: Care Instructions. \"  Current as of: December 27, 2021               Content Version: 13.2  © 2006-2022 Healthwise, Incorporated. Care instructions adapted under license by [employer] (which disclaims liability or warranty for this information). If you have questions about a medical condition or this instruction, always ask your healthcare professional. [address] any warranty or liability for your use of this information.

## 2022-03-25 NOTE — ED NOTES
RN reviewed discharge instructions with the patient. The patient verbalized understanding.   Pt left ED ambulatory with discharge papers in hand

## 2022-03-25 NOTE — ED TRIAGE NOTES
Pt referred to ED by doctors office for concerns for arteritis. Pt has had pressure behind eyes and blurred vision x 1 week.

## 2022-04-01 LAB
BACTERIA SPEC CULT: NORMAL
GRAM STN SPEC: NORMAL
GRAM STN SPEC: NORMAL
SERVICE CMNT-IMP: NORMAL

## 2022-04-05 ENCOUNTER — OFFICE VISIT (OUTPATIENT)
Dept: FAMILY MEDICINE CLINIC | Age: 37
End: 2022-04-05
Payer: MEDICARE

## 2022-04-05 VITALS
WEIGHT: 266 LBS | SYSTOLIC BLOOD PRESSURE: 100 MMHG | OXYGEN SATURATION: 98 % | HEIGHT: 64 IN | HEART RATE: 77 BPM | TEMPERATURE: 98 F | BODY MASS INDEX: 45.41 KG/M2 | DIASTOLIC BLOOD PRESSURE: 69 MMHG

## 2022-04-05 DIAGNOSIS — F32.A DEPRESSION, UNSPECIFIED DEPRESSION TYPE: ICD-10-CM

## 2022-04-05 DIAGNOSIS — F20.9 SCHIZOPHRENIA, UNSPECIFIED TYPE (HCC): ICD-10-CM

## 2022-04-05 DIAGNOSIS — Z71.3 WEIGHT LOSS COUNSELING, ENCOUNTER FOR: ICD-10-CM

## 2022-04-05 DIAGNOSIS — E66.01 MORBID OBESITY DUE TO EXCESS CALORIES (HCC): Primary | ICD-10-CM

## 2022-04-05 PROCEDURE — G9717 DOC PT DX DEP/BP F/U NT REQ: HCPCS | Performed by: STUDENT IN AN ORGANIZED HEALTH CARE EDUCATION/TRAINING PROGRAM

## 2022-04-05 PROCEDURE — G0463 HOSPITAL OUTPT CLINIC VISIT: HCPCS | Performed by: STUDENT IN AN ORGANIZED HEALTH CARE EDUCATION/TRAINING PROGRAM

## 2022-04-05 PROCEDURE — 99213 OFFICE O/P EST LOW 20 MIN: CPT | Performed by: STUDENT IN AN ORGANIZED HEALTH CARE EDUCATION/TRAINING PROGRAM

## 2022-04-05 PROCEDURE — G8417 CALC BMI ABV UP PARAM F/U: HCPCS | Performed by: STUDENT IN AN ORGANIZED HEALTH CARE EDUCATION/TRAINING PROGRAM

## 2022-04-05 PROCEDURE — G8427 DOCREV CUR MEDS BY ELIG CLIN: HCPCS | Performed by: STUDENT IN AN ORGANIZED HEALTH CARE EDUCATION/TRAINING PROGRAM

## 2022-04-05 NOTE — PROGRESS NOTES
Chief Complaint   Patient presents with    Weight Loss     Visit Vitals  /69 (BP 1 Location: Right arm, BP Patient Position: Sitting, BP Cuff Size: Large adult)   Pulse 77   Temp 98 °F (36.7 °C) (Oral)   Ht 5' 4\" (1.626 m)   Wt 266 lb (120.7 kg)   SpO2 98%   BMI 45.66 kg/m²

## 2022-04-05 NOTE — PROGRESS NOTES
[address] [facility] [address] [place] [address]   Office [phone], Fax [phone]      Chief Complaint:     Chief Complaint   Patient presents with    Weight Loss       Ishmael Hopper is a 39 y.o. female that presents for: Obesity and Weight loss      Assessment/Plan:   I personally reviewed the following Pertinent Labs/Studies:   - Encounter Notes from 2022, Labs from 2022      Diagnoses and all orders for this visit:    1. Morbid obesity due to excess calories (HCC)  Assessment & Plan:  - Counseled on diet, exercise, and weight loss  - 2-4 lbs weight loss per month  - Initial target weight in 6 months: (5-10%) 245  - Personal goals: Reduced portion size, decrease carbs increase meat and vegetables, strength training and HIIT with daughter, eating slower and waiting 15 minutes after meal before eating more, decrease bread intake  - Intermittent fasting, no food past 7PM, smaller meals, low carbs, increase fiber/protein  - Select one bad food or snack per weak and decrease significantly or eliminate completely  - Don't drink your calories  - Weekly weight and sleep log  - Try to keep a meal diary or use kim  - Referral to dietician      Orders:  -     REFERRAL TO DIETITIAN    2. Depression, unspecified depression type  Comments:  Managed by Psych    3. Schizophrenia, unspecified type ([facility]  Comments:  Managed by Psych    4. Weight loss counseling, encounter for  Comments:  See above  Orders:  -     REFERRAL TO DIETITIAN         Follow up: Follow-up and Dispositions    · Return in about 4 weeks (around 5/3/2022) for Weight loss and obesity.           Subjective:   HPI:  Ishmael Hopper is a 39 y.o. female that presents for:    Obesity/Weight Loss:  - Pt is enrolled in [employer] weight loss program  - Comorbidities: Depression, Schizoprenia  - Weight loss medications/duration: topamax for migraines  - Initial Weight: 266lbs  - Initial BMI: 45  - Obesity Hx: Always has been overweight, started in childhood. Family all overweight. Max weight was 283 about a year ago and has recently been able to lose 20lbs with diet changes but now has plateaud. Has history of Bulimia 9 years ago. Not currently. Today's BMI: Body mass index is 45.66 kg/m². Weight Trend:   Last 3 Recorded Weights in this Encounter    04/05/22 1025   Weight: 266 lb (120.7 kg)        Weight loss barriers: Motivation, Family over eats and therefore poor support at home except for daughter (good motivator and positive influence)  Diet: Heavy in carbs, loves eating home baked bread. Large meals. At times does not eat at all during a day. Other times only eats 2 meals a day. Drinks: Mostly water or zero calorie drinks  Water Intake: Poor  Snacks: Bread  Sleep: Poor, but has referral to sleep med  Exercise: Poor, walks for an hour at gym but does not break a sweat and has issues with knees from obesity      ROS:   Review of Systems   Constitutional: Negative for diaphoresis. Eyes: Negative for blurred vision. Respiratory: Negative for shortness of breath. Cardiovascular: Negative for chest pain and palpitations. Gastrointestinal: Negative for abdominal pain, constipation, diarrhea, nausea and vomiting. Neurological: Negative for dizziness and headaches. Past medical history, social history, and medications personally reviewed. Past Medical History:   Diagnosis Date    Calculus of kidney     Depression 1/5/2021    H/O headache 7/8/2013    Headache(784.0)     Kidney disease     Migraines     Pseudotumor cerebri syndrome 7/8/2013        Allergies personally reviewed. No Known Allergies       Objective:   Vitals reviewed. Visit Vitals  /69 (BP 1 Location: Right arm, BP Patient Position: Sitting, BP Cuff Size: Large adult)   Pulse 77   Temp 98 °F (36.7 °C) (Oral)   Ht 5' 4\" (1.626 m)   Wt 266 lb (120.7 kg)   SpO2 98%   BMI 45.66 kg/m²        Physical Exam    Vitals Reviewed. General AO x 3.  No distress. Not diaphoretic. No jaundice. No cyanosis. No pallor. Cardio Normal rate, regular rhythm. Pulmonary Effort normal. No accessory muscle use. Neurological CN II-XII grossly intact. No focal deficits. Skin No rash. Pt was discussed with Dr Galindo Brandon (attending physician). I have reviewed pertinent labs results and other data. I have discussed the diagnosis with the patient and the intended plan as seen in the above orders. The patient has received an after-visit summary and questions were answered concerning future plans. I have discussed medication side effects and warnings with the patient as well.     Josh Duran MD  Resident [address] [facility]  04/05/22

## 2022-04-05 NOTE — ASSESSMENT & PLAN NOTE
- Counseled on diet, exercise, and weight loss  - 2-4 lbs weight loss per month  - Initial target weight in 6 months: (5-10%) 245  - Personal goals: Reduced portion size, decrease carbs increase meat and vegetables, strength training and HIIT with daughter, eating slower and waiting 15 minutes after meal before eating more, decrease bread intake  - Intermittent fasting, no food past 7PM, smaller meals, low carbs, increase fiber/protein  - Select one bad food or snack per weak and decrease significantly or eliminate completely  - Don't drink your calories  - Weekly weight and sleep log  - Try to keep a meal diary or use kim  - Referral to dietician

## 2022-04-06 ENCOUNTER — HOSPITAL ENCOUNTER (OUTPATIENT)
Dept: SLEEP MEDICINE | Age: 37
Discharge: HOME | End: 2022-04-06
Payer: MEDICARE

## 2022-04-06 ENCOUNTER — OFFICE VISIT (OUTPATIENT)
Dept: SLEEP MEDICINE | Age: 37
End: 2022-04-06

## 2022-04-06 DIAGNOSIS — G47.33 OSA (OBSTRUCTIVE SLEEP APNEA): Primary | ICD-10-CM

## 2022-04-06 PROCEDURE — 95806 SLEEP STUDY UNATT&RESP EFFT: CPT | Performed by: SPECIALIST

## 2022-04-06 NOTE — PROGRESS NOTES
[address] [place]. [place], [address]  Tel.  [phone]  Fax. [address] [facility] [address]  [place], [address] [facility]  Tel.  [phone]  Fax. [phone] [address] [address] [address] [address]  Tel.  [phone]  Fax. [phone]       S>Fadia Sharma is a 39 y.o. female seen today to receive a home sleep testing unit (HST). · Patient was educated on proper hookup and operation of the HST. · Instruction forms and documentation were reviewed and signed. · The patient demonstrated good understanding of the HST.    O>    There were no vitals taken for this visit. A>  No diagnosis found. P>  · General information regarding operations and maintenance of the device was provided. · She was provided information on sleep apnea including coresponding risk factors and the importance of proper treatment. · Follow-up appointment was made to return the HST. She will be contacted once the results have been reviewed. · She was asked to contact our office for any problems regarding her home sleep test study.    · [facility] [address]

## 2022-04-08 ENCOUNTER — OFFICE VISIT (OUTPATIENT)
Dept: NEUROLOGY | Age: 37
End: 2022-04-08
Payer: MEDICARE

## 2022-04-08 VITALS
WEIGHT: 261.4 LBS | SYSTOLIC BLOOD PRESSURE: 130 MMHG | DIASTOLIC BLOOD PRESSURE: 80 MMHG | HEART RATE: 83 BPM | BODY MASS INDEX: 46.32 KG/M2 | HEIGHT: 63 IN | OXYGEN SATURATION: 100 %

## 2022-04-08 DIAGNOSIS — G93.2 PSEUDOTUMOR CEREBRI SYNDROME: Primary | ICD-10-CM

## 2022-04-08 DIAGNOSIS — G93.89 OTHER SPECIFIED DISORDERS OF BRAIN: ICD-10-CM

## 2022-04-08 DIAGNOSIS — G43.709 CHRONIC MIGRAINE WITHOUT AURA WITHOUT STATUS MIGRAINOSUS, NOT INTRACTABLE: ICD-10-CM

## 2022-04-08 PROCEDURE — 99215 OFFICE O/P EST HI 40 MIN: CPT | Performed by: PSYCHIATRY & NEUROLOGY

## 2022-04-08 PROCEDURE — G8428 CUR MEDS NOT DOCUMENT: HCPCS | Performed by: PSYCHIATRY & NEUROLOGY

## 2022-04-08 PROCEDURE — G9717 DOC PT DX DEP/BP F/U NT REQ: HCPCS | Performed by: PSYCHIATRY & NEUROLOGY

## 2022-04-08 PROCEDURE — G8417 CALC BMI ABV UP PARAM F/U: HCPCS | Performed by: PSYCHIATRY & NEUROLOGY

## 2022-04-08 RX ORDER — ACETAZOLAMIDE 125 MG/1
250 TABLET ORAL 2 TIMES DAILY
Qty: 360 TABLET | Refills: 0 | Status: SHIPPED | OUTPATIENT
Start: 2022-04-08 | End: 2022-07-05 | Stop reason: SDUPTHER

## 2022-04-08 RX ORDER — SUMATRIPTAN 100 MG/1
TABLET, FILM COATED ORAL
Qty: 9 TABLET | Refills: 2 | Status: SHIPPED | OUTPATIENT
Start: 2022-04-08 | End: 2022-07-07

## 2022-04-08 NOTE — LETTER
4/8/2022    Patient: Joan Buck   YOB: 1985   Date of Visit: 4/8/2022     Jasmine Santoyo MD  [address] [facility] [address] [facility] [address]  [address] [facility] [address]    Dear Jasmine Santoyo MD,      Thank you for referring Ms. Fadia Jimenez to [facility] for evaluation. My notes for this consultation are attached. If you have questions, please do not hesitate to call me. I look forward to following your patient along with you.       Sincerely,    Anita Bean MD

## 2022-04-08 NOTE — PROGRESS NOTES
Andrew Jack (1985) is a 39 y.o. female, established patient, here for evaluation of the following     Chief complaint(s):   Chief Complaint   Patient presents with    Follow-up    Migraine       SUBJECTIVE/ OBJECTIVE:    HPI: 39 y.o. female Hx Depression, Kidney Disease, Headaches, Kidney stones, Hx of Pseudotumor Cerebri (dx 7-8-13)       Accompanied by Daughter who translates (pt primarily speaks English, but does speak some English)    Pt did have CT head done 3- for c/o headache: FINDINGS: Brain parenchyma shows no CT apparent ischemia. There is no apparent mass on unenhanced iaging. There is no bleed, shift, obstructive hydrocephalus or significant extra-axial fluid collection. Bone windows are unremarkable. IMPRESSION:  No acute intracranial finding. It also appears having she had LP on same day/ in ER which showed Opening pressure was approximately 30 cmH2O.  12 ml of clear blood tinged  cerebrospinal fluid were collected in divided tubes\" and she was dx as having idiopathic intracranial hypertension, and advised to f/u with PCP and specialist.   CSF results from that day: Cell count #1 (pink hazy 7000 RBC, 8 WBC), Gram stain culture no organisms seen, rare WBCs seen, no growth x7 days, glucose 61, protein 53, cell count #2 (pink, clear, hazy, 9500 RBCs, 5 WBCs. MRI Brain: reprinted order at last visit in Sept 2021; MRI still has not been completed. Has followed up with PCP who to get started in a formal weight loss program    # of headache a week: 3-5  # of migraine days a week: 2    No longer on Aimovig as insurance didn't approve   Continues on Topamax 200 mg AM/ 100 mg PM (for mood; Prescribed by Psychiatry)      ========================================    Prior Data/ Hx:     From initial visit 6/8/2021.  27 yo female referred for syncope evaluation. Reviewed notes.   Pt had an episode of syncope on 1-5-21 (suicide attempt, overdosed on tylenol, was evaluated/ treated at [facility]). 2nd episode was while at Psychiatrist office, had IM injection of something, passed out. No other episodes of passing out. She describes other episodes where has a sudden heavy sensation on her chest, hard to breath. She feels like she's going to pass out. She will try to eat something sugary/ sugary drink and then feel better. Is not diabetic. No personal hx CAD, MI, or other cardiac issues.      She wants to talk about chronic right eye pain. Has hx of pseudotumor cerebri, dx [phone] at [address] [place] [address], due to an episode of bilateral visual loss. Has had intermittent right eye stabbing pain for years. can go up to 1-2 weeks without the pain. When she gets eye eye pain + right sided headache, it can happen on and off for a few days, waxing-waning in intensity. Impression/ Plan:   1. Discussed with patient that her syncopal episodes by history are non-epileptic. One was triggered by intentional overdose, 2nd was after receiving an injection (therefore vasovagal episode). Will check EEG and Brain MRI to complete syncope evaluation. 2. Hx of Pseudotumor cerebri (IIH), hx of chronic migraine; no papilledema on funduscopic exam today. Discussed starting Aimovig once a month (every 30 days) SQ injector to reduce migraine frequency. She wanted to do that. Gave her sample of Aimovig 70 mg SQ injector to get started with and sent a Rx for one injector her local pharmacy to fill before she goes. This will cover a 2 month period. Follow up with when returning from [facility]. Syncopal episodes in past: non-epileptic by description. EEG was normal.  Reprinted MRI Brain order and advised pt to complete it so we can exclude any intracranial abnormalities as a cause of her syncopal episodes. No Known Allergies      Current Outpatient Medications:     acetaZOLAMIDE (DIAMOX) 125 mg tablet, Take 2 Tablets by mouth two (2) times a day for 90 days.  For Pseudotumor cerebri (aka Idiopathic Intracranial Hypertension), Disp: 360 Tablet, Rfl: 0    SUMAtriptan (IMITREX) 100 mg tablet, 1 tab at onset of migraine ; repeat 1 tab in 2 hours if headache remains. Limit use to 2 days per week., Disp: 9 Tablet, Rfl: 2    ibuprofen 200 mg cap, Take  by mouth., Disp: , Rfl:     Invega Sustenna 234 mg/1.5 mL injection, INJECT 1 SYRINGE SUBCUTANEOUSLY AS DIRECTED EVERY 3 WEEKS, Disp: , Rfl:     hydrOXYzine HCL (ATARAX) 50 mg tablet, TAKE 1 TABLET BY MOUTH AT BEDTIME AS NEEDED FOR SLEEP DO NOT DRIVE IF SEDATED, Disp: , Rfl:     lamoTRIgine (LaMICtal) 100 mg tablet, Take 100 mg by mouth nightly.  , Disp: , Rfl:     melatonin 5 mg tablet, Take 5 mg by mouth nightly., Disp: , Rfl:      has a past medical history of Calculus of kidney, Depression (1/5/2021), H/O headache (7/8/2013), Headache(784.0), Kidney disease, Migraines, and Pseudotumor cerebri syndrome (7/8/2013). She has no past medical history of Anemia, Asthma, Chlamydia, Complication of anesthesia, Diabetes ([facility]), Genital herpes, unspecified, Gonorrhea, Heart abnormality, Herpes gestationis, Herpes simplex without mention of complication, Human immunodeficiency virus (HIV) disease ([facility]), Infertility, female, Liver disease, Phlebitis and thrombophlebitis of unspecified site, Postpartum depression, Rhesus isoimmunization unspecified as to episode of care in pregnancy, Sickle-cell disease, unspecified, Syphilis, Systemic lupus erythematosus ([facility]), Thyroid activity decreased, Trauma, Unspecified breast disorder, Unspecified diseases of blood and blood-forming organs, or Unspecified epilepsy without mention of intractable epilepsy. has a past surgical history that includes emg limited (2/12/2016).       Physical Exam:    Vitals:    04/08/22 0829   BP: 130/80   Pulse: 83   Height: 5' 3\" (1.6 m)   Weight: 118.6 kg (261 lb 6.4 oz)   SpO2: 100%     Awake, resting, alert, NAD    EOMI, no aphasia, no dysarthria    Visual Field exam: could not get a good look at either optic disc due to narrow pupils    Moving all extremities w/o difficulty          ========================================    ASSESSMENT/ PLAN:       ICD-10-CM ICD-9-CM    1. Pseudotumor cerebri syndrome  G93.2 348.2 acetaZOLAMIDE (DIAMOX) 125 mg tablet      MRI BRAIN W WO CONT      MRV BRAIN WO CONT      REFERRAL TO OPHTHALMOLOGY   2. Other specified disorders of brain   G93.89 348.89 MRV BRAIN WO CONT   3. Chronic migraine without aura without status migrainosus, not intractable  G43.709 346.70 SUMAtriptan (IMITREX) 100 mg tablet        D/w patient-daughter that weight loss is extremely important in reversing the pseudotumor cerebri/ IIH. Encouraged her to make it her daily goal (exercise, diet modification) and continue working with PCP toward that goal.    Re-entered order for Brain MRI +/- contrast, ordered MRV Brain, referred to Ophthalmology for eye exam    D/c'd Topamax (because it interacts with Diamox)  Started on Diamox 125 mg TWO tablets twice a day  Rx'd Sumatriptan tablet to take up to 2 days a week prn migraine    F/u in 3 months        An electronic signature was used to authenticate this note.   -- José Miguel Buckner MD

## 2022-04-12 ENCOUNTER — TELEPHONE (OUTPATIENT)
Dept: SLEEP MEDICINE | Age: 37
End: 2022-04-12

## 2022-04-12 DIAGNOSIS — G47.33 OSA (OBSTRUCTIVE SLEEP APNEA): Primary | ICD-10-CM

## 2022-04-22 ENCOUNTER — HOSPITAL ENCOUNTER (OUTPATIENT)
Dept: MRI IMAGING | Age: 37
Discharge: HOME | End: 2022-04-22
Attending: PSYCHIATRY & NEUROLOGY
Payer: MEDICARE

## 2022-04-22 DIAGNOSIS — G93.2 PSEUDOTUMOR CEREBRI SYNDROME: ICD-10-CM

## 2022-04-22 DIAGNOSIS — G93.89 OTHER SPECIFIED DISORDERS OF BRAIN: ICD-10-CM

## 2022-04-22 PROCEDURE — 74011250636 HC RX REV CODE- 250/636: Performed by: PSYCHIATRY & NEUROLOGY

## 2022-04-22 PROCEDURE — 70553 MRI BRAIN STEM W/O & W/DYE: CPT

## 2022-04-22 PROCEDURE — A9576 INJ PROHANCE MULTIPACK: HCPCS | Performed by: PSYCHIATRY & NEUROLOGY

## 2022-04-22 PROCEDURE — 70544 MR ANGIOGRAPHY HEAD W/O DYE: CPT

## 2022-04-22 RX ADMIN — GADOTERIDOL 20 ML: 279.3 INJECTION, SOLUTION INTRAVENOUS at 12:11

## 2022-05-20 ENCOUNTER — HOSPITAL ENCOUNTER (OUTPATIENT)
Dept: SLEEP MEDICINE | Age: 37
Discharge: HOME | End: 2022-05-20
Payer: MEDICARE

## 2022-05-20 PROCEDURE — 95810 POLYSOM 6/> YRS 4/> PARAM: CPT | Performed by: SPECIALIST

## 2022-05-21 ENCOUNTER — DOCUMENTATION ONLY (OUTPATIENT)
Dept: SLEEP MEDICINE | Age: 37
End: 2022-05-21

## 2022-05-21 NOTE — PROGRESS NOTES
217 [facility] [place]. [place], [address]  Tel.  [phone]  Fax. 100 [facility] 60  [place], [address] [facility]  Tel.  [phone]  Fax. [phone] [address] [facility] [address] Vaz[address]  Tel.  [phone]  Fax. [phone]     Sleep Study Technical Notes        PRE-Test:  Camille Hercules (: 1985) arrived in the lobby. Patient was greeted and screening questions asked. The patient was taken to the Sleep Center and taken directly to his/her room.  Vitals:  o Temperature (98.2)   o BP (112/81)   o SaO2 (98%)   o Weight per patient (169PM)   Procedure explained to the patient and questions were answered. The patient expressed understanding of the procedure. Electrodes were applied without incident. The patient was placed in bed and the study was started.  PAP mask acclimation for **min. Patient did tolerate mask.  Sleep aid taken:  N      Acquisition Notes:   Lights off: 10:14pm     Respiratory events: jd, hypopnea, central   ECG:  NSR   Snoring:  Mild snore noted   PAP titration:   o Eliminated events:  o Reduced events:  o Events at  final pressure n/a   Desensitization Mask(s) Used: n/a   Positional therapy with: Other comments: PT slept well,a few apnea, and mild snoring noted   o Patient had caregiver in attendance:  N  o Patient watched TV or on phone after lights out for ** hours  o Patient slept with positional therapy:  Y used  2 pillows  o Patient slept with head of bed elevated:  N  o Patient wore an oral appliance:  N  o Patient to bathroom 1 times  o Pediatric Patient:  - Parent accompanied patient: N  - Parent slept in bed with patient: N      POST Test:   Patient was awakened. Electrodes were removed. The patient was discharged after answering the Post Questionnaire. Patient stated that she was alert and ok to drive.  Equipment and room cleaned per infection control policy.

## 2022-05-24 ENCOUNTER — TELEPHONE (OUTPATIENT)
Dept: SLEEP MEDICINE | Age: 37
End: 2022-05-24

## 2022-05-26 ENCOUNTER — OFFICE VISIT (OUTPATIENT)
Dept: FAMILY MEDICINE CLINIC | Age: 37
End: 2022-05-26
Payer: MEDICARE

## 2022-05-26 VITALS
HEART RATE: 73 BPM | TEMPERATURE: 97.5 F | RESPIRATION RATE: 16 BRPM | HEIGHT: 63 IN | WEIGHT: 260 LBS | DIASTOLIC BLOOD PRESSURE: 78 MMHG | SYSTOLIC BLOOD PRESSURE: 102 MMHG | OXYGEN SATURATION: 96 % | BODY MASS INDEX: 46.07 KG/M2

## 2022-05-26 DIAGNOSIS — E66.01 MORBID OBESITY DUE TO EXCESS CALORIES (HCC): ICD-10-CM

## 2022-05-26 DIAGNOSIS — Z71.3 WEIGHT LOSS COUNSELING, ENCOUNTER FOR: Primary | ICD-10-CM

## 2022-05-26 PROCEDURE — G8417 CALC BMI ABV UP PARAM F/U: HCPCS | Performed by: STUDENT IN AN ORGANIZED HEALTH CARE EDUCATION/TRAINING PROGRAM

## 2022-05-26 PROCEDURE — 99213 OFFICE O/P EST LOW 20 MIN: CPT | Performed by: STUDENT IN AN ORGANIZED HEALTH CARE EDUCATION/TRAINING PROGRAM

## 2022-05-26 PROCEDURE — G9717 DOC PT DX DEP/BP F/U NT REQ: HCPCS | Performed by: STUDENT IN AN ORGANIZED HEALTH CARE EDUCATION/TRAINING PROGRAM

## 2022-05-26 PROCEDURE — G0463 HOSPITAL OUTPT CLINIC VISIT: HCPCS | Performed by: STUDENT IN AN ORGANIZED HEALTH CARE EDUCATION/TRAINING PROGRAM

## 2022-05-26 PROCEDURE — G8427 DOCREV CUR MEDS BY ELIG CLIN: HCPCS | Performed by: STUDENT IN AN ORGANIZED HEALTH CARE EDUCATION/TRAINING PROGRAM

## 2022-05-26 RX ORDER — BUPROPION HYDROCHLORIDE 150 MG/1
TABLET ORAL
COMMUNITY
Start: 2022-05-11 | End: 2022-10-19 | Stop reason: ALTCHOICE

## 2022-05-26 NOTE — PROGRESS NOTES
Identified pt with two pt identifiers(name and ). Reviewed record in preparation for visit and have obtained necessary documentation. Chief Complaint   Patient presents with    Weight Management        Health Maintenance Due   Topic    Cervical cancer screen     Medicare Yearly Exam     COVID-19 Vaccine (3 - Booster for Pfizer series)        Visit Vitals  /78 (BP 1 Location: Right arm, BP Patient Position: Sitting, BP Cuff Size: Large adult)   Pulse 73   Temp 97.5 °F (36.4 °C) (Temporal)   Resp 16   Ht 5' 3\" (1.6 m)   Wt 260 lb (117.9 kg)   LMP 2022   SpO2 96%   BMI 46.06 kg/m²     Pain Scale: 0 - No pain/10    Coordination of Care Questionnaire:  :   1. Have you been to the ER, urgent care clinic since your last visit? Hospitalized since your last visit? No    2. Have you seen or consulted any other health care providers outside of the [address] [place] [address] since your last visit? Include any pap smears or colon screening.  No

## 2022-05-26 NOTE — ASSESSMENT & PLAN NOTE
6lb weight loss to date  Trouble with call back from dietician  Will refer to another dietician  Include high protein/fiber, low carb bars to help curb hunger  Start calorie counting  Is on Wellbutrin for depression

## 2022-05-26 NOTE — PROGRESS NOTES
[address] [facility] [address] [place] [address]   Office [phone], Fax [phone]      Chief Complaint:     Chief Complaint   Patient presents with    Weight Management       Zaira Lombardi is a 39 y.o. female that presents for: Weight loss      Assessment/Plan:   I personally reviewed the following Pertinent Labs/Studies:   - Encounter Notes from 2022, Labs from 2022. Diagnoses and all orders for this visit:    1. Weight loss counseling, encounter for  Assessment & Plan:  6lb weight loss to date  Trouble with call back from dietician  Will refer to another dietician  Include high protein/fiber, low carb bars to help curb hunger  Start calorie counting  Is on Wellbutrin for depression    Orders:  -     REFERRAL TO DIETITIAN    2. Morbid obesity due to excess calories ([facility] [address]  -     REFERRAL TO DIETITIAN         Follow up: Follow-up and Dispositions    · Return in about 1 month (around 6/26/2022) for Weight loss. Subjective:   HPI:  Zaira Lombardi is a 39 y.o. female that presents for:    Obesity/Weight Loss:  - Pt is enrolled in [employer] weight loss program  - Comorbidities: Depression, Schizoprenia  - Weight loss [id number]/duration: topamax for migraines  - Initial Weight: 266lbs  - Initial BMI: 45  - Obesity Hx: Always has been overweight, started in childhood. Family all overweight. Max weight was 283 about a year ago and has recently been able to lose 20lbs with diet changes but now has plateaud. Has history of Bulimia 9 years ago. Not currently. Today's BMI: Body mass index is 46.06 kg/m². Weight Trend:   Last 3 Recorded Weights in this Encounter    05/26/22 1055   Weight: 260 lb (117.9 kg)        Weight loss plan during this past inteval: Has now almost cut all of the bread out of her diet. Decreased carbs. Weight loss barriers: Still feel hungry at times.   Diet: Meat and salads (vinegar only)  Drinks: Mostly water or zero calorie drinks  Water Intake: 64 oz or more daily  Snacks: replaced bad snacks with fruit  Sleep: Awaiting final sleep study results  Exercise: Going to gym 3 times a week about an hour and a half      ROS:   Review of Systems   Constitutional: Negative for chills and fever. Eyes: Negative for blurred vision. Respiratory: Negative for shortness of breath. Cardiovascular: Negative for chest pain. Gastrointestinal: Negative for abdominal pain, constipation, diarrhea, nausea and vomiting. Neurological: Negative for dizziness and headaches. Past medical history, social history, and medications personally reviewed. Past Medical History:   Diagnosis Date    Calculus of kidney     Depression 1/5/2021    H/O headache 7/8/2013    Headache(784.0)     Kidney disease     Migraines     Pseudotumor cerebri syndrome 7/8/2013        Allergies personally reviewed. No Known Allergies       Objective:   Vitals reviewed. Visit Vitals  /78 (BP 1 Location: Right arm, BP Patient Position: Sitting, BP Cuff Size: Large adult)   Pulse 73   Temp 97.5 °F (36.4 °C) (Temporal)   Resp 16   Ht 5' 3\" (1.6 m)   Wt 260 lb (117.9 kg)   LMP 04/03/2022   SpO2 96%   BMI 46.06 kg/m²        Physical Exam    Vitals Reviewed. General AO x 3. No distress. Not diaphoretic. No jaundice. No cyanosis. No pallor. Cardio Normal rate, regular rhythm. Pulmonary Effort normal. No accessory muscle use. Extremities No edema of lower extremities. Pulses 2+. Neurological CN II-XII grossly intact. No focal deficits. Pt was discussed with Dr Kayy Stack (attending physician). I have reviewed pertinent labs results and other data. I have discussed the diagnosis with the patient and the intended plan as seen in the above orders. The patient has received an after-visit summary and questions were answered concerning future plans. I have discussed medication side effects and warnings with the patient as well.     Venus Mason MD  [facility]  05/26/22

## 2022-05-30 NOTE — TELEPHONE ENCOUNTER
PSG performed for potential sleep disordered breathing. 435 minutes recorded of which 299.5 minutes spent asleep with a sleep efficiency of 65.8%. Sleep onset at 33.8 minutes; REM onset at 244.5 minutes with total REM representing 30.2% of sleep time. All sleep stages observed. 18 respiratory events occurred of which 15 hypopnea and 3 apnea (1 central, 2 obstructive). Overall AHI normal at 3.6/h. Minimal SaO2 87%. Mild snoring noted. Events more frequent when supine. Impression: PSG does not demonstrate significant sleep disordered breathing. Sleep technologist: Please advise patient of study results.

## 2022-06-02 NOTE — TELEPHONE ENCOUNTER
[employer] was used to review sleep study results with patient. Message left for patient to review information in there [employer] account and call our office with further questions.  #[phone]

## 2022-06-24 ENCOUNTER — OFFICE VISIT (OUTPATIENT)
Dept: FAMILY MEDICINE CLINIC | Age: 37
End: 2022-06-24
Payer: MEDICARE

## 2022-06-24 VITALS
DIASTOLIC BLOOD PRESSURE: 68 MMHG | HEART RATE: 75 BPM | WEIGHT: 261 LBS | RESPIRATION RATE: 18 BRPM | OXYGEN SATURATION: 97 % | BODY MASS INDEX: 46.25 KG/M2 | SYSTOLIC BLOOD PRESSURE: 100 MMHG | HEIGHT: 63 IN

## 2022-06-24 DIAGNOSIS — Z71.3 WEIGHT LOSS COUNSELING, ENCOUNTER FOR: ICD-10-CM

## 2022-06-24 DIAGNOSIS — E66.01 MORBID OBESITY DUE TO EXCESS CALORIES (HCC): Primary | ICD-10-CM

## 2022-06-24 PROCEDURE — G9717 DOC PT DX DEP/BP F/U NT REQ: HCPCS | Performed by: STUDENT IN AN ORGANIZED HEALTH CARE EDUCATION/TRAINING PROGRAM

## 2022-06-24 PROCEDURE — 99214 OFFICE O/P EST MOD 30 MIN: CPT | Performed by: STUDENT IN AN ORGANIZED HEALTH CARE EDUCATION/TRAINING PROGRAM

## 2022-06-24 PROCEDURE — G8427 DOCREV CUR MEDS BY ELIG CLIN: HCPCS | Performed by: STUDENT IN AN ORGANIZED HEALTH CARE EDUCATION/TRAINING PROGRAM

## 2022-06-24 PROCEDURE — G8417 CALC BMI ABV UP PARAM F/U: HCPCS | Performed by: STUDENT IN AN ORGANIZED HEALTH CARE EDUCATION/TRAINING PROGRAM

## 2022-06-24 PROCEDURE — G0463 HOSPITAL OUTPT CLINIC VISIT: HCPCS | Performed by: STUDENT IN AN ORGANIZED HEALTH CARE EDUCATION/TRAINING PROGRAM

## 2022-06-24 RX ORDER — METFORMIN HYDROCHLORIDE 500 MG/1
500 TABLET ORAL 2 TIMES DAILY WITH MEALS
Qty: 60 TABLET | Refills: 1 | Status: SHIPPED | OUTPATIENT
Start: 2022-06-24 | End: 2022-07-13 | Stop reason: SDUPTHER

## 2022-06-24 NOTE — PROGRESS NOTES
[address] [facility] [address] [place] [address]   Office [phone], Fax [phone]      Chief Complaint:     Tamiko Gold is a 39 y.o. female that presents for:     Chief Complaint   Patient presents with    Weight Management         Assessment/Plan:   I personally reviewed the following Pertinent Labs/Studies:   - Encounter Notes from 2022, labs from 2022. Diagnoses and all orders for this visit:    1. Morbid obesity due to excess calories ([facility])  Assessment & Plan:  Has maintained 6 lbs of weight loss but unable to lose more  Doing really well with the gym  Protein bars only added calories as she was unable to use them as meal replacement  Still feels hungry through out the day  Depression well controlled  Would like to try addition of weight loss med  Other weight loss meds contra indicated such as topiramate given current psych meds  Will trial metformin for 1 month (500 mg BID)  Stop protein bars  Continue to calorie count  Lack of weight loss may be addition of muscle mass given that she works out everyday at the gym  Has not been able to see dietician, will send in new referral    Orders:  -     REFERRAL TO DIETITIAN  -     metFORMIN (GLUCOPHAGE) 500 mg tablet; Take 1 Tablet by mouth two (2) times daily (with meals). 2. Weight loss counseling, encounter for  -     REFERRAL TO DIETITIAN  -     metFORMIN (GLUCOPHAGE) 500 mg tablet; Take 1 Tablet by mouth two (2) times daily (with meals). Follow up: Follow-up and Dispositions    · Return in about 4 weeks (around 7/22/2022) for Weight loss. Subjective:   HPI:  Tamiko Gold is a 39 y.o. female that presents for:      Obesity/Weight Loss:  - Pt is enrolled in [employer] weight loss program  - Comorbidities: Depression, Schizoprenia  - Weight loss [id number]/duration: topamax for migraines  - Initial Weight: 266lbs  - Initial BMI: 45  - Obesity Hx: Always has been overweight, started in childhood. Family all overweight. Max weight was 283 about a year ago and has recently been able to lose 20lbs with diet changes but now has plateaud. Has history of Bulimia 9 years ago. Not currently.         Today's BMI: Body mass index is 46.23 kg/m². Weight Trend:   Last 3 Recorded Weights in this Encounter    06/24/22 1035   Weight: 261 lb (118.4 kg)        Weight loss plan during this past inteval: Dietician, high protein meal replacement bars, calorie counting  Weight loss barriers: Cravings/Snacks, Large meals with large amounts of carbs, and lack of good sleep, and idle at home all day  Diet: Previously heavy in carbs, binge eating snacks, now mostly salads and meats/fish, low carbs and protein bars. Reports that protein bars have just added calories because she will still eat on top of those instead of using them as meal replacement, thereby increasing total calories  Drinks: Mostly water or zero calorie drinks  Water Intake: 64 oz or more daily  Snacks: No longer binging snacks, donuts, ice cream, cake  Sleep: Poor, waking up to urinate at least 5 times a night  Exercise: Excellent, both cardio and strength training. Has been doing really well with the gym. ROS:   Review of Systems   Eyes: Negative for blurred vision. Respiratory: Negative for shortness of breath. Cardiovascular: Negative for chest pain. Gastrointestinal: Negative for abdominal pain, nausea and vomiting. Neurological: Negative for dizziness and headaches. Psychiatric/Behavioral: Negative for depression. Past medical history, social history, and medications personally reviewed. Past Medical History:   Diagnosis Date    Calculus of kidney     Depression 1/5/2021    H/O headache 7/8/2013    Headache(784.0)     Kidney disease     Migraines     Pseudotumor cerebri syndrome 7/8/2013        Allergies personally reviewed. No Known Allergies       Objective:   Vitals reviewed.   Visit Vitals  /68 (BP 1 Location: Right upper arm, BP Patient Position: Sitting, BP Cuff Size: Large adult)   Pulse 75   Resp 18   Ht 5' 3\" (1.6 m)   Wt 261 lb (118.4 kg)   SpO2 97%   BMI 46.23 kg/m²        Physical Exam  Physical Exam  Vitals and nursing note reviewed. Constitutional:       General: She is not in acute distress. Appearance: Normal appearance. She is not ill-appearing, toxic-appearing or diaphoretic. Cardiovascular:      Rate and Rhythm: Normal rate and regular rhythm. Pulses: Normal pulses. Pulmonary:      Effort: Pulmonary effort is normal.   Skin:     General: Skin is warm and dry. Neurological:      General: No focal deficit present. Mental Status: She is alert and oriented to person, place, and time. Pt was discussed with Dr Antione Mojica (attending physician). I have reviewed pertinent labs results and other data. I have discussed the diagnosis with the patient and the intended plan as seen in the above orders. The patient has received an after-visit summary and questions were answered concerning future plans. I have discussed medication side effects and warnings with the patient as well.     Nick Alfredo MD  Resident [facility]  06/27/22

## 2022-06-24 NOTE — PROGRESS NOTES
Andrew Jack is a 39 y.o. female    Chief Complaint   Patient presents with    Weight Management       1. \"Have you been to the ER, urgent care clinic since your last visit? Hospitalized since your last visit? \" No    2. \"Have you seen or consulted any other health care providers outside of the [address] [place] [address] since your last visit? \" No     3. For patients aged 39-70: Has the patient had a colonoscopy / FIT/ Cologuard? NA - based on age      If the patient is female:    4. For patients aged 41-77: Has the patient had a mammogram within the past 2 years? NA - based on age or sex      11. For patients aged 21-65: Has the patient had a pap smear? Yes - Care Gap present. Most recent result on file    Vitals:    06/24/22 1035   BP: 100/68   BP 1 Location: Right upper arm   BP Patient Position: Sitting   BP Cuff Size: Large adult   Pulse: 75   Resp: 18   Height: 5' 3\" (1.6 m)   Weight: 261 lb (118.4 kg)   SpO2: 97%             Health Maintenance Due   Topic Date Due    Cervical cancer screen  07/10/2016    Medicare Yearly Exam  Never done    COVID-19 Vaccine (3 - Booster for Pfizer series) 09/24/2021         Medication Reconciliation completed, changes noted.   Please update medication list.

## 2022-06-27 NOTE — ASSESSMENT & PLAN NOTE
Has maintained 6 lbs of weight loss but unable to lose more  Doing really well with the gym  Protein bars only added calories as she was unable to use them as meal replacement  Still feels hungry through out the day  Depression well controlled  Would like to try addition of weight loss med  Other weight loss meds contra indicated such as topiramate given current psych meds  Will trial metformin for 1 month (500 mg BID)  Stop protein bars  Continue to calorie count  Lack of weight loss may be addition of muscle mass given that she works out everyday at the gym  Has not been able to see dietician, will send in new referral

## 2022-07-01 ENCOUNTER — PATIENT MESSAGE (OUTPATIENT)
Dept: NEUROLOGY | Age: 37
End: 2022-07-01

## 2022-07-01 DIAGNOSIS — G93.2 PSEUDOTUMOR CEREBRI SYNDROME: ICD-10-CM

## 2022-07-05 RX ORDER — ACETAZOLAMIDE 250 MG/1
250 TABLET ORAL 2 TIMES DAILY
Qty: 60 TABLET | Refills: 0 | Status: SHIPPED | OUTPATIENT
Start: 2022-07-05 | End: 2022-07-29 | Stop reason: SDUPTHER

## 2022-07-05 NOTE — TELEPHONE ENCOUNTER
From: Raissa Ch  To: Johnson Sow MD  Sent: 7/1/2022 1:01 PM EDT  Subject: Waqas Orts Dr. Delonte Dillon,   I just came back on vacation and I forgot my medicine at the hotel and can't get it back. Could you please send a refill to my pharmacy for the medicine for the Diamox. Thank you.

## 2022-07-07 ENCOUNTER — OFFICE VISIT (OUTPATIENT)
Dept: NEUROLOGY | Age: 37
End: 2022-07-07
Payer: MEDICARE

## 2022-07-07 VITALS
RESPIRATION RATE: 16 BRPM | HEART RATE: 78 BPM | BODY MASS INDEX: 46.07 KG/M2 | OXYGEN SATURATION: 97 % | SYSTOLIC BLOOD PRESSURE: 128 MMHG | DIASTOLIC BLOOD PRESSURE: 78 MMHG | WEIGHT: 260 LBS | HEIGHT: 63 IN

## 2022-07-07 DIAGNOSIS — G93.2 PSEUDOTUMOR CEREBRI SYNDROME: Primary | ICD-10-CM

## 2022-07-07 DIAGNOSIS — G43.709 CHRONIC MIGRAINE WITHOUT AURA WITHOUT STATUS MIGRAINOSUS, NOT INTRACTABLE: ICD-10-CM

## 2022-07-07 PROCEDURE — G8427 DOCREV CUR MEDS BY ELIG CLIN: HCPCS | Performed by: PSYCHIATRY & NEUROLOGY

## 2022-07-07 PROCEDURE — G8417 CALC BMI ABV UP PARAM F/U: HCPCS | Performed by: PSYCHIATRY & NEUROLOGY

## 2022-07-07 PROCEDURE — 99215 OFFICE O/P EST HI 40 MIN: CPT | Performed by: PSYCHIATRY & NEUROLOGY

## 2022-07-07 PROCEDURE — G9717 DOC PT DX DEP/BP F/U NT REQ: HCPCS | Performed by: PSYCHIATRY & NEUROLOGY

## 2022-07-07 RX ORDER — NAPROXEN SODIUM 220 MG
220 TABLET ORAL 2 TIMES DAILY WITH MEALS
COMMUNITY
End: 2022-09-21 | Stop reason: ALTCHOICE

## 2022-07-07 RX ORDER — RIZATRIPTAN BENZOATE 10 MG/1
10 TABLET ORAL
Qty: 9 TABLET | Refills: 1 | Status: SHIPPED | OUTPATIENT
Start: 2022-07-07 | End: 2022-07-07

## 2022-07-07 NOTE — PROGRESS NOTES
Chief Complaint   Patient presents with    Follow-up     pseudotumor cerebri syndrome, patient c/o a headache for the last 10 days, using aleve and sumatriptan with no relief     Visit Vitals  /78   Pulse 78   Resp 16   Ht 5' 3\" (1.6 m)   Wt 117.9 kg (260 lb)   SpO2 97%   BMI 46.06 kg/m²

## 2022-07-07 NOTE — LETTER
7/7/2022    Patient: Aaron Morales   YOB: 1985   Date of Visit: 7/7/2022     Sal Hallman MD  [facility] [address]  [address] [facility] [address]    Dear Sal Hallman MD,      Thank you for referring Ms. Fadia Jimenez to [facility] for evaluation. My notes for this consultation are attached. If you have questions, please do not hesitate to call me. I look forward to following your patient along with you.       Sincerely,    Sal Felton MD

## 2022-07-07 NOTE — PROGRESS NOTES
Kulwant Galvez (1985) is a 39 y.o. female, established patient, here for evaluation of the following     Chief complaint(s):   Chief Complaint   Patient presents with    Follow-up     pseudotumor cerebri syndrome, patient c/o a headache for the last 10 days, using aleve and sumatriptan with no relief       SUBJECTIVE/ OBJECTIVE:    HPI: 39 y.o. female Hx Depression, Kidney Disease, Headaches, Kidney stones, Hx of Pseudotumor Cerebri (dx 7-8-13)     Accompanied by Son who translates (pt primarily speaks Bengali, but does speak some English)    Brain MRI and MRV were ordered at the last visit. Those were done on 4/22/2022. Discussed with patient that the brain MRI did not show any significant abnormalities, though it did show a partial empty sella which could be an incidental finding, or associated with pseudotumor cerebri. There was no evidence of optic nerve / sheath abnormality, or flattening of the posterior sclera. MRV Brain was normal.     Radiology did comment that there was 3. Diffusely T1 hypointense marrow signal, most commonly seen with anemia or obesity, but can be seen with a variety of marrow replacing processes. Correlate clinically. Reviewing patient's most recent CBC, there is no evidence of anemia.   She is morbidly obese (BMI 46)    Stopped Topamax at last visit and started pt on Diamox 250 mg BID  Also added Sumatriptan for episodic migraine    Since being on Diamox pt reports reduction in headache frequency  # of headache a week: 0-1 (was 3-5 days a week)  # of migraine days a week: 0-1 (was 2 days week)     Sumatriptan 100 mg didn't alleviate her migraine  No longer on Aimovig as insurance didn't approve     Pt reprots she missed left her Diamox in a hotel for for 4-5 days last week,   Says she had headahe til yesterday when she restarted  No headache complaint today    She was working with PCP regarding weight loss but says that her last PCP has left clinic and she hasn't seen her new PCP yet. On Metformin for weight loss, not diabetic. Patient says she did see the ophthalmologist after she saw me and was told that there was some evidence of pseudotumor on her eye exam.  I do not have a copy of that report.        ========================================    Prior Data/ Hx:     From initial visit 6/8/2021.  29 yo female referred for syncope evaluation. Reviewed notes. Pt had an episode of syncope on 1-5-21 (suicide attempt, overdosed on tylenol, was evaluated/ treated at [facility]). 2nd episode was while at Psychiatrist office, had IM injection of something, passed out. No other episodes of passing out. She describes other episodes where has a sudden heavy sensation on her chest, hard to breath. She feels like she's going to pass out. She will try to eat something sugary/ sugary drink and then feel better. Is not diabetic. No personal hx CAD, MI, or other cardiac issues.      She wants to talk about chronic right eye pain. Has hx of pseudotumor cerebri, dx [id number] at [facility], due to an episode of bilateral visual loss. Has had intermittent right eye stabbing pain for years. can go up to 1-2 weeks without the pain. When she gets eye eye pain + right sided headache, it can happen on and off for a few days, waxing-waning in intensity. Impression/ Plan:   1. Discussed with patient that her syncopal episodes by history are non-epileptic. One was triggered by intentional overdose, 2nd was after receiving an injection (therefore vasovagal episode). Will check EEG and Brain MRI to complete syncope evaluation. 2. Hx of Pseudotumor cerebri (IIH), hx of chronic migraine; no papilledema on funduscopic exam today. Discussed starting Aimovig once a month (every 30 days) SQ injector to reduce migraine frequency. She wanted to do that. Gave her sample of Aimovig 70 mg SQ injector to get started with and sent a Rx for one injector her local pharmacy to fill before she goes.   This will cover a 2 month period. Follow up with when returning from [facility]. Syncopal episodes in past: non-epileptic by description. EEG was normal.       ER visit on 3-: CT head FINDINGS: Brain parenchyma shows no CT apparent ischemia. There is no apparent mass on unenhanced iaging. There is no bleed, shift, obstructive hydrocephalus or significant extra-axial fluid collection. Bone windows are unremarkable. IMPRESSION:  No acute intracranial finding. It also appears having she had LP on same day/ in ER which showed Opening pressure was approximately 30 cmH2O.  12 ml of clear blood tinged  cerebrospinal fluid were collected in divided tubes\" and she was dx as having idiopathic intracranial hypertension, and advised to f/u with PCP and specialist.   CSF results from that day: Cell count #1 (pink hazy 7000 RBC, 8 WBC), Gram stain culture no organisms seen, rare WBCs seen, no growth x7 days, glucose 61, protein 53, cell count #2 (pink, clear, hazy, 9500 RBCs, 5 WBCs). No Known Allergies      Current Outpatient Medications:     naproxen sodium (Aleve) 220 mg tablet, Take 220 mg by mouth two (2) times daily (with meals). , Disp: , Rfl:     rizatriptan (MAXALT) 10 mg tablet, Take 1 Tablet by mouth once as needed for Migraine for up to 1 dose. May repeat in 2 hours if needed. Limit use to 2 days a week., Disp: 9 Tablet, Rfl: 1    acetaZOLAMIDE (DIAMOX) 250 mg tablet, Take 1 Tablet by mouth two (2) times a day. For Pseudotumor cerebri (aka Idiopathic Intracranial Hypertension), Disp: 60 Tablet, Rfl: 0    metFORMIN (GLUCOPHAGE) 500 mg tablet, Take 1 Tablet by mouth two (2) times daily (with meals). , Disp: 60 Tablet, Rfl: 1    buPROPion XL (WELLBUTRIN XL) 150 mg tablet, , Disp: , Rfl:     Invega Sustenna 234 mg/1.5 mL injection, INJECT 1 SYRINGE SUBCUTANEOUSLY AS DIRECTED EVERY 3 WEEKS, Disp: , Rfl:     hydrOXYzine HCL (ATARAX) 50 mg tablet, TAKE 1 TABLET BY MOUTH AT BEDTIME AS NEEDED FOR SLEEP DO NOT DRIVE IF SEDATED, Disp: , Rfl:     lamoTRIgine (LaMICtal) 100 mg tablet, Take 100 mg by mouth nightly.  , Disp: , Rfl:     melatonin 5 mg tablet, Take 5 mg by mouth nightly., Disp: , Rfl:      has a past medical history of Calculus of kidney, Depression (1/5/2021), H/O headache (7/8/2013), Headache(784.0), Kidney disease, Migraines, and Pseudotumor cerebri syndrome (7/8/2013). She has no past medical history of Anemia, Asthma, Chlamydia, Complication of anesthesia, Diabetes ([facility] [address] Genital herpes, unspecified, Gonorrhea, Heart abnormality, Herpes gestationis, Herpes simplex without mention of complication, Human immunodeficiency virus (HIV) disease ([facility] [address] Infertility, female, Liver disease, Phlebitis and thrombophlebitis of unspecified site, Postpartum depression, Rhesus isoimmunization unspecified as to episode of care in pregnancy, Sickle-cell disease, unspecified, Syphilis, Systemic lupus erythematosus ([facility] [address] Thyroid activity decreased, Trauma, Unspecified breast disorder, Unspecified diseases of blood and blood-forming organs, or Unspecified epilepsy without mention of intractable epilepsy. has a past surgical history that includes emg limited (2/12/2016). Physical Exam:    Vitals:    07/07/22 0909   BP: 128/78   Pulse: 78   Resp: 16   Height: 5' 3\" (1.6 m)   Weight: 117.9 kg (260 lb)   SpO2: 97%     Awake alert oriented conversant no acute distress  Visual fields are normal in all quadrants bilateral  EOMI  No evidence of papilledema in either eye, optic disc margins appeared sharp today  Moves all extremities without difficulty  Gait is normal    ========================================    ASSESSMENT/ PLAN:       ICD-10-CM ICD-9-CM    1. Pseudotumor cerebri syndrome  G93.2 348.2    2.  Chronic migraine without aura without status migrainosus, not intractable  G43.709 346.70 rizatriptan (MAXALT) 10 mg tablet      Nurse to get a copy of his recent ophthalmology visit  No evidence of papilledema today on my exam  Continue with Diamox 250 mg twice a day  DC'd sumatriptan added rizatriptan for migraine relief  Continue to encourage her efforts towards weight loss and working with PCP to reach that goal    Regarding the \"diffuse T1 hypointense marrow signal abnormality\" seen on Brain MRI; I reviewed pt's most recent CBC and there is no evidence of anemia. The other causes per radiology could be obesity versus a \"variety of marrow replacing processes. Correlate clinically. \" Pt is morbidly obese (BMI 46) which is probably the cause of the MRI findings, but I will defer to PCP on whether there any evidence of hematologic disorder. Follow-up with me in 3 months      An electronic signature was used to authenticate this note.   -- Aamir Ji MD

## 2022-07-13 DIAGNOSIS — E66.01 MORBID OBESITY DUE TO EXCESS CALORIES (HCC): ICD-10-CM

## 2022-07-13 DIAGNOSIS — Z71.3 WEIGHT LOSS COUNSELING, ENCOUNTER FOR: ICD-10-CM

## 2022-07-13 RX ORDER — METFORMIN HYDROCHLORIDE 500 MG/1
500 TABLET ORAL 2 TIMES DAILY WITH MEALS
Qty: 60 TABLET | Refills: 1 | Status: SHIPPED | OUTPATIENT
Start: 2022-07-13

## 2022-08-23 ENCOUNTER — OFFICE VISIT (OUTPATIENT)
Dept: FAMILY MEDICINE CLINIC | Age: 37
End: 2022-08-23
Payer: MEDICARE

## 2022-08-23 VITALS
HEIGHT: 63 IN | DIASTOLIC BLOOD PRESSURE: 68 MMHG | BODY MASS INDEX: 45.86 KG/M2 | SYSTOLIC BLOOD PRESSURE: 117 MMHG | WEIGHT: 258.8 LBS | TEMPERATURE: 97.5 F | OXYGEN SATURATION: 96 % | RESPIRATION RATE: 14 BRPM | HEART RATE: 84 BPM

## 2022-08-23 DIAGNOSIS — E66.01 OBESITY, CLASS III, BMI 40-49.9 (MORBID OBESITY) (HCC): Primary | ICD-10-CM

## 2022-08-23 PROBLEM — E11.9 TYPE 2 DIABETES MELLITUS (HCC): Status: ACTIVE | Noted: 2022-08-23

## 2022-08-23 PROBLEM — E11.9 TYPE 2 DIABETES MELLITUS (HCC): Status: RESOLVED | Noted: 2022-08-23 | Resolved: 2022-08-23

## 2022-08-23 PROCEDURE — 99215 OFFICE O/P EST HI 40 MIN: CPT | Performed by: FAMILY MEDICINE

## 2022-08-23 PROCEDURE — G8427 DOCREV CUR MEDS BY ELIG CLIN: HCPCS | Performed by: FAMILY MEDICINE

## 2022-08-23 PROCEDURE — G8417 CALC BMI ABV UP PARAM F/U: HCPCS | Performed by: FAMILY MEDICINE

## 2022-08-23 PROCEDURE — G9717 DOC PT DX DEP/BP F/U NT REQ: HCPCS | Performed by: FAMILY MEDICINE

## 2022-08-23 PROCEDURE — G0463 HOSPITAL OUTPT CLINIC VISIT: HCPCS | Performed by: FAMILY MEDICINE

## 2022-08-23 NOTE — PROGRESS NOTES
Calros Rangel  39 y.o. female  1985  [id number]  [address] [place] [address] [place] [address]  [id number]   [address]:   Initial Weight Loss Visit  Carlos Lino MD       Encounter Date: 8/23/2022        Chief Complaint   Patient presents with    Follow-up     Patient is here for weight loss. History of Present Illness     Weight Mgmt:          The patient is a 39y.o. year old obese female who presents today for medical weight loss, her PCP is Daily Keith MD. Pt's initial weight is 258 pounds with a BMI of 45.8. Patient's highest weight was 271 pounds. Patient's goal weight is 225 pounds. The patient's bariatric comorbidities include Depression/Schizophrena (complications due to medication). The patient's reason for medical weight loss is to feel better. The patient's biggest struggle with losing weight has been: Making lifestyle changes without significant improvement in weight. Goes to the gym 3 times a week  Eating less, Drinking a lot of water (lemon)  Replaced sugar with splenda  When she stopped meat she lost weight     First meal 1-2 in afternoon. Eggs, [employer], Chicken, left over food. Sometime eats dinner 5pm Beef or chicken with rice or soup. Sometimes soda, mostly water. 1 coffee in the AM.     Eats fruits a fair amount. Review of Systems   Review of Systems   Respiratory: Negative. Cardiovascular: Negative. Gastrointestinal: Negative. All other systems reviewed and are negative. Vitals     Vitals:    08/23/22 1610   BP: 117/68   Pulse: 84   Resp: 14   Temp: 97.5 °F (36.4 °C)   SpO2: 96%   Weight: 258 lb 12.8 oz (117.4 kg)   Height: 5' 3\" (1.6 m)     Body mass index is 45.84 kg/m².     Weight Trend     Weight Metrics 8/23/2022 7/7/2022 6/24/2022 5/26/2022 4/8/2022 4/5/2022 3/25/2022   Weight 258 lb 12.8 oz 260 lb 261 lb 260 lb 261 lb 6.4 oz 266 lb 265 lb   BMI 45.84 kg/m2 46.06 kg/m2 46.23 kg/m2 46.06 kg/m2 46.3 kg/m2 45.66 kg/m2 45.49 kg/m2         Physical Exam  Constitutional:       Appearance: She is obese. Cardiovascular:      Rate and Rhythm: Normal rate and regular rhythm. Pulmonary:      Effort: Pulmonary effort is normal.      Breath sounds: Normal breath sounds. Abdominal:      General: Bowel sounds are normal.   Neurological:      General: No focal deficit present. Mental Status: She is alert and oriented to person, place, and time. Psychiatric:         Attention and Perception: Perception normal. She does not perceive auditory or visual hallucinations. Mood and Affect: Affect is blunt. Behavior: Behavior is slowed. Behavior is cooperative. Thought Content: Thought content is not paranoid or delusional.         Cognition and Memory: Cognition and memory normal.         Judgment: Judgment is not inappropriate. Results for orders placed or performed during the hospital encounter of 03/25/22   CULTURE, CSF W GRAM STAIN    Specimen: Cerebrospinal Fluid   Result Value Ref Range    Special Requests: NO SPECIAL REQUESTS      GRAM STAIN RARE WBCS SEEN      GRAM STAIN NO ORGANISMS SEEN      Culture result: Culture performed on Unspun Fluid      Culture result: NO GROWTH ON SOLID MEDIA AT 4 DAYS      Culture result: NO GROWTH IN THIO BROTH AT 7 DAYS     SAMPLES BEING HELD   Result Value Ref Range    SAMPLES BEING HELD 1RED     COMMENT        Add-on orders for these samples will be processed based on acceptable specimen integrity and analyte stability, which may vary by analyte.    METABOLIC PANEL, COMPREHENSIVE   Result Value Ref Range    Sodium 136 136 - 145 mmol/L    Potassium 3.9 3.5 - 5.1 mmol/L    Chloride 108 97 - 108 mmol/L    CO2 24 21 - 32 mmol/L    Anion gap 4 (L) 5 - 15 mmol/L    Glucose 86 65 - 100 mg/dL    BUN 8 6 - 20 MG/DL    Creatinine 0.62 0.55 - 1.02 MG/DL    BUN/Creatinine ratio 13 12 - 20      GFR est AA >60 >60 ml/min/1.73m2    GFR est non-AA >60 >60 ml/min/1.73m2 Calcium 9.2 8.5 - 10.1 MG/DL    Bilirubin, total 1.0 0.2 - 1.0 MG/DL    ALT (SGPT) 24 12 - 78 U/L    AST (SGOT) 12 (L) 15 - 37 U/L    Alk. phosphatase 70 45 - 117 U/L    Protein, total 7.7 6.4 - 8.2 g/dL    Albumin 3.9 3.5 - 5.0 g/dL    Globulin 3.8 2.0 - 4.0 g/dL    A-G Ratio 1.0 (L) 1.1 - 2.2     CBC WITH AUTOMATED DIFF   Result Value Ref Range    WBC 4.9 3.6 - 11.0 K/uL    RBC 4.82 3.80 - 5.20 M/uL    HGB 13.6 11.5 - 16.0 g/dL    HCT 41.0 35.0 - 47.0 %    MCV 85.1 80.0 - 99.0 FL    MCH 28.2 26.0 - 34.0 PG    MCHC 33.2 30.0 - 36.5 g/dL    RDW 12.7 11.5 - 14.5 %    PLATELET 621 247 - 522 K/uL    MPV 10.1 8.9 - 12.9 FL    NRBC 0.0 0  WBC    ABSOLUTE NRBC 0.00 0.00 - 0.01 K/uL    NEUTROPHILS 50 32 - 75 %    LYMPHOCYTES 39 12 - 49 %    MONOCYTES 7 5 - 13 %    EOSINOPHILS 2 0 - 7 %    BASOPHILS 1 0 - 1 %    IMMATURE GRANULOCYTES 1 (H) 0.0 - 0.5 %    ABS. NEUTROPHILS 2.5 1.8 - 8.0 K/UL    ABS. LYMPHOCYTES 1.9 0.8 - 3.5 K/UL    ABS. MONOCYTES 0.3 0.0 - 1.0 K/UL    ABS. EOSINOPHILS 0.1 0.0 - 0.4 K/UL    ABS. BASOPHILS 0.0 0.0 - 0.1 K/UL    ABS. IMM.  GRANS. 0.1 (H) 0.00 - 0.04 K/UL    DF AUTOMATED     SED RATE (ESR)   Result Value Ref Range    Sed rate, automated 33 (H) 0 - 20 mm/hr   C REACTIVE PROTEIN, QT   Result Value Ref Range    C-Reactive protein 1.44 (H) 0.00 - 0.60 mg/dL   PROTHROMBIN TIME + INR   Result Value Ref Range    INR 1.1 0.9 - 1.1      Prothrombin time 11.0 9.0 - 11.1 sec   CELL COUNT, CSF   Result Value Ref Range    CSF TUBE NO. 1      CSF COLOR PINK (A) COL      SPUN COLOR CLEAR (A) COL      CSF APPEARANCE HAZY (A) CLEAR      CSF RBCs 7,000 (H) 0 /cu mm    CSF WBCs 8 (H) 0 - 5 /cu mm   GLUCOSE, CSF   Result Value Ref Range    Tube No. 2      Glucose,CSF 61 40 - 70 MG/DL   PROTEIN, CSF   Result Value Ref Range    Tube No. 2      Protein,CSF 53 (H) 15 - 45 MG/DL   CELL COUNT, CSF   Result Value Ref Range    CSF TUBE NO. 4      CSF COLOR PINK (A) COL      SPUN COLOR CLEAR (A) COL      CSF APPEARANCE HAZY (A) CLEAR      CSF RBCs 9,500 (H) 0 /cu mm    CSF WBCs 5 0 - 5 /cu mm       Assessment and Plan:   1. Obesity, Class III, BMI 40-49.9 (morbid obesity) ([address]  Weight gain likely due, in part, to use of psychiatric medications. These are necessary and cannot be discontinued. Regular follow-up with her psychiatrist will be important. Counseled on lifestyle changes. Continue to do physical activity. Track hunger scale. May continue on Metformin as this can help prevent weight gain in patients on anti-psychotic medications such as Invega. Use the Hunger scale. Eat when you are level 3. Protect your level 2s. Slow down when eating. Try eating for 10 minutes, then take a 5 minute break. If you are still hungry you can have more. I have discussed the diagnosis with the patient and the intended plan as seen in the above orders. she has expressed understanding. The patient has received an after-visit summary and questions were answered concerning future plans. I have discussed medication side effects and warnings with the patient as well. Follow-up and Dispositions    Return in about 4 weeks (around 9/20/2022) for Weight loss follow-up.        Patient encounter was at > 40 minutes of total time spend on the date of the encounter: preparing to see the patient(reviewing prior notes and tests), obtaining and/or reviewing separately obtained history, performing a medially appropriate examination and/or evaluation, counseling and educating the patient/family/caregiver, ordering medications, tests or procedures documenting clinical information in the electronic or other health record, independently interpreting results(not separately reported) and communicating results to the patient/family/caregiver and care coordination(not separately reported       Electronically Signed: Tasha Tyson MD     History   Patients past medical, surgical and family histories were reviewed and updated. Past Medical History:   Diagnosis Date    Calculus of kidney     Depression 1/5/2021    H/O headache 7/8/2013    Headache(784.0)     Kidney disease     Migraines     Pseudotumor cerebri syndrome 7/8/2013     Past Surgical History:   Procedure Laterality Date    EMG LIMITED  2/12/2016     Family History   Problem Relation Age of Onset    Diabetes Mother     Hypertension Mother     Diabetes Father     Hypertension Father     Cancer Neg Hx      Social History     Tobacco Use    Smoking status: Never    Smokeless tobacco: Never   Substance Use Topics    Alcohol use: No    Drug use: No              Current Medications/Allergies     Current Outpatient Medications   Medication Sig Dispense Refill    acetaZOLAMIDE (DIAMOX) 250 mg tablet Take 1 Tablet by mouth two (2) times a day. For Pseudotumor cerebri (aka Idiopathic Intracranial Hypertension) 180 Tablet 1    metFORMIN (GLUCOPHAGE) 500 mg tablet Take 1 Tablet by mouth two (2) times daily (with meals). 60 Tablet 1    naproxen sodium (NAPROSYN) 220 mg tablet Take 220 mg by mouth two (2) times daily (with meals). buPROPion XL (WELLBUTRIN XL) 150 mg tablet       melatonin 5 mg tablet Take 5 mg by mouth nightly. Invega Sustenna 234 mg/1.5 mL injection INJECT 1 SYRINGE SUBCUTANEOUSLY AS DIRECTED EVERY 3 WEEKS (Patient not taking: Reported on 8/23/2022)      hydrOXYzine HCL (ATARAX) 50 mg tablet TAKE 1 TABLET BY MOUTH AT BEDTIME AS NEEDED FOR SLEEP DO NOT DRIVE IF SEDATED (Patient not taking: Reported on 8/23/2022)      lamoTRIgine (LaMICtal) 100 mg tablet Take 100 mg by mouth nightly.    (Patient not taking: Reported on 8/23/2022)       No Known Allergies

## 2022-08-23 NOTE — PATIENT INSTRUCTIONS
Use the Hunger scale. Eat when you are level 3. Protect your level 2s. Slow down when eating. Try eating for 10 minutes, then take a 5 minute break. If you are still hungry you can have more.

## 2022-08-23 NOTE — PROGRESS NOTES
Akila Martinez is a 39 y.o. female    Chief Complaint   Patient presents with    Follow-up     Patient is here for weight loss. 1. Have you been to the ER, urgent care clinic since your last visit? Hospitalized since your last visit? No  2. Have you seen or consulted any other health care providers outside of the [address] [place] [address] since your last visit? Include any pap smears or colon screening.  No    Visit Vitals  /68 (BP 1 Location: Right arm, BP Patient Position: Sitting, BP Cuff Size: Adult)   Pulse 84   Temp 97.5 °F (36.4 °C)   Resp 14   Ht 5' 3\" (1.6 m)   Wt 258 lb 12.8 oz (117.4 kg)   SpO2 96%   BMI 45.84 kg/m²     3 most recent PHQ Screens 8/23/2022   Little interest or pleasure in doing things Not at all   Feeling down, depressed, irritable, or hopeless Not at all   Total Score PHQ 2 0     Health Maintenance Due   Topic Date Due    Cervical cancer screen  07/10/2016    Medicare Yearly Exam  Never done    COVID-19 Vaccine (3 - Booster for Anguiano Peter series) 09/24/2021

## 2022-09-14 ENCOUNTER — OFFICE VISIT (OUTPATIENT)
Dept: FAMILY MEDICINE CLINIC | Age: 37
End: 2022-09-14
Payer: MEDICARE

## 2022-09-14 ENCOUNTER — HOSPITAL ENCOUNTER (OUTPATIENT)
Dept: LAB | Age: 37
Discharge: HOME | End: 2022-09-14
Payer: MEDICARE

## 2022-09-14 VITALS
HEIGHT: 63 IN | HEART RATE: 74 BPM | WEIGHT: 259 LBS | OXYGEN SATURATION: 97 % | SYSTOLIC BLOOD PRESSURE: 110 MMHG | RESPIRATION RATE: 17 BRPM | TEMPERATURE: 97.3 F | BODY MASS INDEX: 45.89 KG/M2 | DIASTOLIC BLOOD PRESSURE: 77 MMHG

## 2022-09-14 DIAGNOSIS — Z12.4 CERVICAL CANCER SCREENING: ICD-10-CM

## 2022-09-14 DIAGNOSIS — N92.6 IRREGULAR MENSES: Primary | ICD-10-CM

## 2022-09-14 DIAGNOSIS — Z11.3 SCREEN FOR STD (SEXUALLY TRANSMITTED DISEASE): ICD-10-CM

## 2022-09-14 LAB
HCG URINE, QL. (POC): NEGATIVE
VALID INTERNAL CONTROL?: YES

## 2022-09-14 PROCEDURE — G8427 DOCREV CUR MEDS BY ELIG CLIN: HCPCS

## 2022-09-14 PROCEDURE — 87624 HPV HI-RISK TYP POOLED RSLT: CPT

## 2022-09-14 PROCEDURE — 87661 TRICHOMONAS VAGINALIS AMPLIF: CPT

## 2022-09-14 PROCEDURE — 99214 OFFICE O/P EST MOD 30 MIN: CPT

## 2022-09-14 PROCEDURE — G8417 CALC BMI ABV UP PARAM F/U: HCPCS

## 2022-09-14 PROCEDURE — 81025 URINE PREGNANCY TEST: CPT

## 2022-09-14 PROCEDURE — G9717 DOC PT DX DEP/BP F/U NT REQ: HCPCS

## 2022-09-14 PROCEDURE — G0463 HOSPITAL OUTPT CLINIC VISIT: HCPCS

## 2022-09-14 PROCEDURE — 88175 CYTOPATH C/V AUTO FLUID REDO: CPT

## 2022-09-14 NOTE — PROGRESS NOTES
Chief Complaint   Patient presents with    Checkup IUD       History of Present Illness:  Beck Rodriguez is a 39 y.o.  female who presents to clinic to check on her IUD. Says she has a copper IUD which was placed in 2017. Per patient, LMP 2022. She has a long hx of irregular menses but reports worsening within the past several years. Has been having brownish discharge with odor and vaginal itching. Never been tested for STDs. Reports reduced sex drive. Also reports nipple discharge. To note, patient has a pmhx of schizophrenia managed with tri-weekly Invega injections. Past Medical History:   Diagnosis Date    Calculus of kidney     Depression 2021    H/O headache 2013    Headache(784.0)     Kidney disease     Migraines     Pseudotumor cerebri syndrome 2013    Schizophrenia (HCC)        Current Outpatient Medications   Medication Sig Dispense Refill    acetaZOLAMIDE (DIAMOX) 250 mg tablet Take 1 Tablet by mouth two (2) times a day. For Pseudotumor cerebri (aka Idiopathic Intracranial Hypertension) 180 Tablet 1    metFORMIN (GLUCOPHAGE) 500 mg tablet Take 1 Tablet by mouth two (2) times daily (with meals). 60 Tablet 1    buPROPion XL (WELLBUTRIN XL) 150 mg tablet       Invega Sustenna 234 mg/1.5 mL injection       melatonin 5 mg tablet Take 5 mg by mouth nightly. naproxen sodium (NAPROSYN) 220 mg tablet Take 220 mg by mouth two (2) times daily (with meals). (Patient not taking: Reported on 2022)      hydrOXYzine HCL (ATARAX) 50 mg tablet TAKE 1 TABLET BY MOUTH AT BEDTIME AS NEEDED FOR SLEEP DO NOT DRIVE IF SEDATED (Patient not taking: No sig reported)      lamoTRIgine (LaMICtal) 100 mg tablet Take 100 mg by mouth nightly.    (Patient not taking: No sig reported)         No Known Allergies    Social History     Tobacco Use    Smoking status: Never    Smokeless tobacco: Never   Substance Use Topics    Alcohol use: No    Drug use: No       Family History   Problem Relation Age of Onset    Diabetes Mother     Hypertension Mother     Diabetes Father     Hypertension Father     Cancer Neg Hx        Physical Exam:     Visit Vitals  /77   Pulse 74   Temp 97.3 °F (36.3 °C) (Temporal)   Resp 17   Ht 5' 3\" (1.6 m)   Wt 259 lb (117.5 kg)   SpO2 97%   BMI 45.88 kg/m²       3 most recent [facility] 36 Screens 9/14/2022 8/23/2022 7/7/2022   Little interest or pleasure in doing things Not at all Not at all Not at all   Feeling down, depressed, irritable, or hopeless Not at all Not at all Not at all   Total Score PHQ 2 0 0 0           Physical Examination:  General: NAD  CV: Heart: Regular rate. Resp: Breathing comfortably on room air. Neuro: Awake, alert, and appropriately conversant. Skin: no acne or abnormal hair growth   Pelvic exam: IUD strings visualized, yellow/green cervical discharge      Assessment/Plan:    Diagnoses and all orders for this visit:    1. Irregular menses  - chronic issue for patient   - likely multifactorial: obesity vs hyperprolactinemia 2/2 antipsychotic use vs hyperandrogens? - Pt has a copper IUD in place. Will likely benefit from hormonal IUD instead given menstrual irregularities. - POC pregnancy test neg  - TSH, LH, FSH wnl on 03/2022 labs  - prolactin elevated 03/2022. Will repeat today given pt reporting nipple discharge    -     AMB POC URINE PREGNANCY TEST, VISUAL COLOR COMPARISON  -     PROLACTIN; Future  -     RPR; Future    2. Screen for STD (sexually transmitted disease)  -     PAP IG, CT-NG-TV, APTIMA HPV AND [address] 68/51,65([id number]); Future    3. Cervical cancer screening  - Last PAP in 2017 per patient. Screening due. -     PAP IG, CT-NG-TV, APTIMA HPV AND RFX 06/59,57([id number]); Future        Encounter Diagnoses     ICD-10-CM ICD-9-CM   1. Irregular menses  N92.6 626.4   2. Screen for STD (sexually transmitted disease)  Z11.3 V74.5   3. Cervical cancer screening  Z12.4 V76.2             Fadia Cooper expressed understanding of this plan.  An AVS was printed and given to the patient. Seen and discussed with attending.     Brooke Braxton MD  Family Medicine PGY-1

## 2022-09-14 NOTE — PROGRESS NOTES
Chief Complaint   Patient presents with    Checkup IUD     1. Have you been to the ER, urgent care clinic since your last visit? Hospitalized since your last visit? No    2. Have you seen or consulted any other health care providers outside of the [address] [place] [address] since your last visit? Include any pap smears or colon screening.  No

## 2022-09-15 LAB
PROLACTIN SERPL-MCNC: 39.7 NG/ML
RPR SER QL: NONREACTIVE

## 2022-09-16 LAB
C TRACH RRNA SPEC QL NAA+PROBE: NEGATIVE
N GONORRHOEA RRNA SPEC QL NAA+PROBE: NEGATIVE
SPECIMEN SOURCE: NORMAL
T VAGINALIS RRNA SPEC QL NAA+PROBE: NEGATIVE

## 2022-09-21 ENCOUNTER — OFFICE VISIT (OUTPATIENT)
Dept: FAMILY MEDICINE CLINIC | Age: 37
End: 2022-09-21
Payer: MEDICARE

## 2022-09-21 VITALS
TEMPERATURE: 97.5 F | SYSTOLIC BLOOD PRESSURE: 108 MMHG | OXYGEN SATURATION: 98 % | BODY MASS INDEX: 46.46 KG/M2 | DIASTOLIC BLOOD PRESSURE: 35 MMHG | WEIGHT: 262.2 LBS | RESPIRATION RATE: 13 BRPM | HEIGHT: 63 IN | HEART RATE: 88 BPM

## 2022-09-21 DIAGNOSIS — N92.6 IRREGULAR MENSES: ICD-10-CM

## 2022-09-21 DIAGNOSIS — F20.9 SCHIZOPHRENIA, UNSPECIFIED TYPE (HCC): ICD-10-CM

## 2022-09-21 DIAGNOSIS — E66.01 OBESITY, CLASS III, BMI 40-49.9 (MORBID OBESITY) (HCC): Primary | ICD-10-CM

## 2022-09-21 PROCEDURE — G0463 HOSPITAL OUTPT CLINIC VISIT: HCPCS | Performed by: FAMILY MEDICINE

## 2022-09-21 PROCEDURE — 99215 OFFICE O/P EST HI 40 MIN: CPT | Performed by: FAMILY MEDICINE

## 2022-09-21 PROCEDURE — G9717 DOC PT DX DEP/BP F/U NT REQ: HCPCS | Performed by: FAMILY MEDICINE

## 2022-09-21 PROCEDURE — G8427 DOCREV CUR MEDS BY ELIG CLIN: HCPCS | Performed by: FAMILY MEDICINE

## 2022-09-21 PROCEDURE — G8417 CALC BMI ABV UP PARAM F/U: HCPCS | Performed by: FAMILY MEDICINE

## 2022-09-21 RX ORDER — SEMAGLUTIDE 0.5 MG/.5ML
0.5 INJECTION, SOLUTION SUBCUTANEOUS
Qty: 4 EACH | Refills: 0 | Status: SHIPPED | OUTPATIENT
Start: 2022-10-21 | End: 2022-10-13

## 2022-09-21 RX ORDER — SEMAGLUTIDE 0.25 MG/.5ML
0.5 INJECTION, SOLUTION SUBCUTANEOUS
Qty: 4 EACH | Refills: 0 | Status: SHIPPED | OUTPATIENT
Start: 2022-09-21 | End: 2022-10-13

## 2022-09-21 RX ORDER — ARIPIPRAZOLE 10 MG/1
TABLET ORAL
COMMUNITY
Start: 2022-09-07 | End: 2022-10-19 | Stop reason: ALTCHOICE

## 2022-09-21 NOTE — PROGRESS NOTES
Megan Owens  39 y.o. female  1985  [address] [place] [address]  [id number]   [address]: Progress Note  Jayme Kemp MD       Encounter Date: 9/21/2022    Chief Complaint   Patient presents with    Weight Management     Patient here to follow-up on weight loss. History of Present Illness   Megan Owens is a 39 y.o. female who presents to clinic today for follow-up on weight. Since the last visit she has been going to the gym 1+ hours a day, and is doing the bike and exercise/weight machines. Here today with her daughter. Wt Readings from Last 4 Encounters:   09/21/22 262 lb 3.2 oz (118.9 kg)   09/14/22 259 lb (117.5 kg)   08/23/22 258 lb 12.8 oz (117.4 kg)   07/07/22 260 lb (117.9 kg)     Of note, recently saw Dr. Raquel Amos for irregular menses. Recent labs were reviewed. Suspect irregularity may be 2/2 obesity as well as use of paraguard IuD    Lab Results   Component Value Date/Time    FSH 5.2 03/04/2022 10:55 AM    Luteinizing hormone 1.9 03/04/2022 10:55 AM    Prolactin 39.7 09/14/2022 10:30 AM       Review of Systems   Review of Systems - Negative except as noted above  Vitals/Objective:     Vitals:    09/21/22 1641   BP: (!) 108/35   Pulse: 88   Resp: 13   Temp: 97.5 °F (36.4 °C)   SpO2: 98%   Weight: 262 lb 3.2 oz (118.9 kg)   Height: 5' 3\" (1.6 m)     Body mass index is 46.45 kg/m². General: Patient alert and oriented and in NAD  Heart: Regular rate and rhythm, No murmurs, rubs or gallops. 2+ peripheral pulses  Lungs: Clear to auscultation bilaterally, no wheezing, rales or rhonchi  Skin: No rashes or lesions noted on exposed skin,  Psych: Appropriate mood and affect      Assessment and Plan:   1. Obesity, Class III, BMI 40-49.9 (morbid obesity) (HCC)  Weight is stable, which is, at least in part, is a success. She is engaging in regular physical activity and is trying to eat healthier.   Notes her psychiatrist will be transitioning her to a long acting antipsychotic medication. It is unclear if this will help or hinder her attempts at weight loss. Encouraged her regular physical activity and dietary changes. Focus on hunger scale. Will try to get patient covered on a GLP-1 for weight loss as she is likely to have significant improvement with this. 2. Irregular menses  Likely due to a combination of obesity and copper IUD. Bleeding has improved since last visit. Will continue to monitor. Unfortunately, there have been reports of pseudotumor cerebri linked to the use of Mirena and therefore may not be an options. 3. Schizophrenia, unspecified type ([facility] [address]  Upcoming medication adjustments by her psychiatrist.       I have discussed the diagnosis with the patient and the intended plan as seen in the above orders. she has expressed understanding. The patient has received an after-visit summary and questions were answered concerning future plans. I have discussed medication side effects and warnings with the patient as well. Follow-up in 4 weeks. Patient encounter was at > 40 minutes of total time spend on the date of the encounter: preparing to see the patient(reviewing prior notes and tests), obtaining and/or reviewing separately obtained history, performing a medially appropriate examination and/or evaluation, counseling and educating the patient/family/caregiver, ordering medications, tests or procedures, documenting clinical information in the electronic or other health record,    Electronically Signed: Ruth Cee MD     History   Patients past medical, surgical and family histories were reviewed and updated.     Past Medical History:   Diagnosis Date    Calculus of kidney     Depression 01/05/2021    H/O headache 07/08/2013    Headache(784.0)     Kidney disease     Migraines     Pseudotumor cerebri syndrome 07/08/2013    Schizophrenia [facility])      Past Surgical History:   Procedure Laterality Date EMG LIMITED  2/12/2016     Family History   Problem Relation Age of Onset    Diabetes Mother     Hypertension Mother     Diabetes Father     Hypertension Father     Cancer Neg Hx      Social History     Tobacco Use    Smoking status: Never    Smokeless tobacco: Never   Substance Use Topics    Alcohol use: No    Drug use: No              Current Medications/Allergies     Current Outpatient Medications   Medication Sig Dispense Refill    ARIPiprazole (ABILIFY) 10 mg tablet TAKE 1 TABLET BY MOUTH ONCE DAILY IN THE MORNING . TAKE AT BEDTIME IF IT MAKES YOU SLEEPY, CALL CLINIC IF YOU DEVELOP HIGH FEVER, STIFFNESS, CONFUSION      acetaZOLAMIDE (DIAMOX) 250 mg tablet Take 1 Tablet by mouth two (2) times a day. For Pseudotumor cerebri (aka Idiopathic Intracranial Hypertension) 180 Tablet 1    metFORMIN (GLUCOPHAGE) 500 mg tablet Take 1 Tablet by mouth two (2) times daily (with meals). 60 Tablet 1    buPROPion XL (WELLBUTRIN XL) 150 mg tablet       Invega Sustenna 234 mg/1.5 mL injection       melatonin 5 mg tablet Take 5 mg by mouth nightly. naproxen sodium (NAPROSYN) 220 mg tablet Take 220 mg by mouth two (2) times daily (with meals). (Patient not taking: No sig reported)      hydrOXYzine HCL (ATARAX) 50 mg tablet TAKE 1 TABLET BY MOUTH AT BEDTIME AS NEEDED FOR SLEEP DO NOT DRIVE IF SEDATED (Patient not taking: No sig reported)      lamoTRIgine (LaMICtal) 100 mg tablet Take 100 mg by mouth nightly.    (Patient not taking: No sig reported)       No Known Allergies

## 2022-09-27 DIAGNOSIS — N76.0 BACTERIAL VAGINOSIS: Primary | ICD-10-CM

## 2022-09-27 DIAGNOSIS — B96.89 BACTERIAL VAGINOSIS: Primary | ICD-10-CM

## 2022-09-27 RX ORDER — METRONIDAZOLE 500 MG/1
500 TABLET ORAL 2 TIMES DAILY
Qty: 14 TABLET | Refills: 0 | Status: SHIPPED | OUTPATIENT
Start: 2022-09-27 | End: 2022-10-04

## 2022-10-04 ENCOUNTER — PATIENT MESSAGE (OUTPATIENT)
Dept: FAMILY MEDICINE CLINIC | Age: 37
End: 2022-10-04

## 2022-10-13 ENCOUNTER — OFFICE VISIT (OUTPATIENT)
Dept: NEUROLOGY | Age: 37
End: 2022-10-13
Payer: MEDICARE

## 2022-10-13 VITALS
SYSTOLIC BLOOD PRESSURE: 128 MMHG | RESPIRATION RATE: 14 BRPM | OXYGEN SATURATION: 99 % | HEART RATE: 90 BPM | DIASTOLIC BLOOD PRESSURE: 76 MMHG

## 2022-10-13 DIAGNOSIS — G93.2 PSEUDOTUMOR CEREBRI SYNDROME: Primary | ICD-10-CM

## 2022-10-13 PROCEDURE — G8427 DOCREV CUR MEDS BY ELIG CLIN: HCPCS | Performed by: PSYCHIATRY & NEUROLOGY

## 2022-10-13 PROCEDURE — 99214 OFFICE O/P EST MOD 30 MIN: CPT | Performed by: PSYCHIATRY & NEUROLOGY

## 2022-10-13 PROCEDURE — G8417 CALC BMI ABV UP PARAM F/U: HCPCS | Performed by: PSYCHIATRY & NEUROLOGY

## 2022-10-13 PROCEDURE — G9717 DOC PT DX DEP/BP F/U NT REQ: HCPCS | Performed by: PSYCHIATRY & NEUROLOGY

## 2022-10-13 RX ORDER — ACETAZOLAMIDE 250 MG/1
250 TABLET ORAL 2 TIMES DAILY
Qty: 180 TABLET | Refills: 1 | Status: SHIPPED | OUTPATIENT
Start: 2022-10-13

## 2022-10-13 NOTE — LETTER
10/13/2022    Patient: Tea Draper   YOB: 1985   Date of Visit: 10/13/2022     Kyra Schneider MD  [address] [facility] [address]  [place] [place]    Dear Kyra Schneider MD,      Thank you for referring Ms. aFdia Jimenez to [facility] for evaluation. My notes for this consultation are attached. If you have questions, please do not hesitate to call me. I look forward to following your patient along with you.       Sincerely,    Collin Moss MD

## 2022-10-13 NOTE — PROGRESS NOTES
Chief Complaint   Patient presents with    Follow-up     pseudotumor cerebri, patient states she is feeling well, no recent headaches, patient is accompanied by her daughter

## 2022-10-13 NOTE — PROGRESS NOTES
Franky Allen (1985) is a 39 y.o. female, established patient, here for evaluation of the following     Chief complaint(s):   Chief Complaint   Patient presents with    Follow-up     pseudotumor cerebri, patient states she is feeling well, no recent headaches, patient is accompanied by her daughter       Denny Place:    HPI: 39 y.o. female Hx Depression, Kidney Disease, Headaches, Kidney stones, Hx of Pseudotumor Cerebri (dx 7-8-13)     Accompanied by Daughter (pt primarily speaks Slovenian, but does speak some English)    Pt reports her headaches remain well controlled  On Diamox 250 mg twice daily  Says having maybe 2 migraine days a month  Responds better to the Rizatriptan  Sumatriptan didn't work for her in past   INS stopped covering Aimovig  Topamax didn't help headaches    Working with PCP regarding weight loss  On Metformin for that (not diabetic)  Says weight hasn't really changed  Still around 260 lbs (not weighed today)    Last CMP was 3- (before addition of Diamox)  No significant electrolyte abnormality at that time    Reviewed a clinic note dated 6/15/2022 by Dr. Izabela Rutherford Ophthalmologist.  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      ======================================    Prior Data/ Hx:     From initial visit 6/8/2021.  29 yo female referred for syncope evaluation. Reviewed notes. Pt had an episode of syncope on 1-5-21 (suicide attempt, overdosed on tylenol, was evaluated/ [facility] at [facility]). 2nd episode was while at Psychiatrist office, had IM injection of something, passed out. No other episodes of passing out. She describes other episodes where has a sudden heavy sensation on her chest, hard to breath. She feels like she's going to pass out.   She will try to eat something sugary/ sugary drink and then feel better. Is not diabetic. No personal hx CAD, MI, or other cardiac issues. She wants to talk about chronic right eye pain. Has hx of pseudotumor cerebri, dx [id number] at [facility], due to an episode of bilateral visual loss. Has had intermittent right eye stabbing pain for years. can go up to 1-2 weeks without the pain. When she gets eye eye pain + right sided headache, it can happen on and off for a few days, waxing-waning in intensity. Impression/ Plan:   Discussed with patient that her syncopal episodes by history are non-epileptic. One was triggered by intentional overdose, 2nd was after receiving an injection (therefore vasovagal episode). Will check EEG and Brain MRI to complete syncope evaluation. Hx of Pseudotumor cerebri (IIH), hx of chronic migraine; no papilledema on funduscopic exam today. Discussed starting Aimovig once a month (every 30 days) SQ injector to reduce migraine frequency. She wanted to do that. Gave her sample of Aimovig 70 mg SQ injector to get started with and sent a Rx for one injector her local pharmacy to fill before she goes. This will cover a 2 month period. Follow up with when returning from [facility]. Syncopal episodes in past: non-epileptic by description. EEG was normal.       ER visit on 3-: CT head FINDINGS: Brain parenchyma shows no CT apparent ischemia. There is no apparent mass on unenhanced iaging. There is no bleed, shift, obstructive hydrocephalus or significant extra-axial fluid collection. Bone windows are unremarkable. IMPRESSION:  No acute intracranial finding.   It also appears having she had LP on same day/ in ER which showed Opening pressure was approximately 30 cmH2O.  12 ml of clear blood tinged  cerebrospinal fluid were collected in divided tubes\" and she was dx as having idiopathic intracranial hypertension, and advised to f/u with PCP and specialist.   CSF results from that day: Cell count #1 (pink hazy 7000 RBC, 8 WBC), Gram stain culture no organisms seen, rare WBCs seen, no growth x7 days, glucose 61, protein 53, cell count #2 (pink, clear, hazy, 9500 RBCs, 5 WBCs). Brain MRI and MRV were done on 4/22/2022: no significant abnormality; showed a partial empty sella which could be an incidental finding, or associated with pseudotumor cerebri. There was no evidence of optic nerve / sheath abnormality, or flattening of the posterior sclera. MRV Brain was normal.      No Known Allergies      Current Outpatient Medications:     acetaZOLAMIDE (DIAMOX) 250 mg tablet, Take 1 Tablet by mouth two (2) times a day. For Pseudotumor cerebri (aka Idiopathic Intracranial Hypertension), Disp: 180 Tablet, Rfl: 1    ARIPiprazole (ABILIFY) 10 mg tablet, TAKE 1 TABLET BY MOUTH ONCE DAILY IN THE MORNING . TAKE AT BEDTIME IF IT MAKES YOU SLEEPY, CALL CLINIC IF YOU DEVELOP HIGH FEVER, STIFFNESS, CONFUSION, Disp: , Rfl:     metFORMIN (GLUCOPHAGE) 500 mg tablet, Take 1 Tablet by mouth two (2) times daily (with meals). , Disp: 60 Tablet, Rfl: 1    buPROPion XL (WELLBUTRIN XL) 150 mg tablet, , Disp: , Rfl:     hydrOXYzine HCL (ATARAX) 50 mg tablet, TAKE 1 TABLET BY MOUTH AT BEDTIME AS NEEDED FOR SLEEP DO NOT DRIVE IF SEDATED, Disp: , Rfl:     melatonin 5 mg tablet, Take 5 mg by mouth nightly., Disp: , Rfl:      has a past medical history of Calculus of kidney, Depression (01/05/2021), H/O headache (07/08/2013), Headache(784.0), Kidney disease, Migraines, Pseudotumor cerebri syndrome (07/08/2013), and Schizophrenia ([facility] [address]     She has no past medical history of Anemia, Asthma, Chlamydia, Complication of anesthesia, Genital herpes, unspecified, Gonorrhea, Heart abnormality, Herpes gestationis, Herpes simplex without mention of complication, Human immunodeficiency virus (HIV) disease ([facility] [address] Infertility, female, Liver disease, Phlebitis and thrombophlebitis of unspecified site, Postpartum depression, Rhesus isoimmunization unspecified as to episode of care in pregnancy, Sickle-cell disease, unspecified, Syphilis, Systemic lupus erythematosus ([facility] [address] Thyroid activity decreased, Trauma, Unspecified breast disorder, Unspecified diseases of blood and blood-forming organs, or Unspecified epilepsy without mention of intractable epilepsy. has a past surgical history that includes emg limited (2/12/2016). Physical Exam:    Vitals:    10/13/22 0954   BP: 128/76   Pulse: 90   Resp: 14   SpO2: 99%     Awake alert oriented conversant no acute distress  Visual fields are normal in all quadrants bilateral  EOMI  Visual fields normal bilateral  Gait was observed and was normal    ========================================    ASSESSMENT/ PLAN:       ICD-10-CM ICD-9-CM    1. Pseudotumor cerebri syndrome  G93.2 348.2 acetaZOLAMIDE (DIAMOX) 250 mg tablet      METABOLIC PANEL, BASIC        Continue Acetazolamide 250 mg one tablet twice a day  Given lab order to have BMP checked at [facility] or with next visit with PCP (1 week)    Sent copy of this note to PCP and Dr Maia Thayer up in 6 months      An electronic signature was used to authenticate this note.   -- Kirsty Casey MD

## 2022-10-19 ENCOUNTER — OFFICE VISIT (OUTPATIENT)
Dept: FAMILY MEDICINE CLINIC | Age: 37
End: 2022-10-19
Payer: MEDICARE

## 2022-10-19 VITALS
DIASTOLIC BLOOD PRESSURE: 80 MMHG | HEART RATE: 80 BPM | SYSTOLIC BLOOD PRESSURE: 122 MMHG | TEMPERATURE: 97.5 F | BODY MASS INDEX: 46.64 KG/M2 | OXYGEN SATURATION: 99 % | RESPIRATION RATE: 12 BRPM | HEIGHT: 63 IN | WEIGHT: 263.2 LBS

## 2022-10-19 DIAGNOSIS — F20.9 SCHIZOPHRENIA, UNSPECIFIED TYPE (HCC): ICD-10-CM

## 2022-10-19 DIAGNOSIS — E66.01 MORBID OBESITY DUE TO EXCESS CALORIES (HCC): Primary | ICD-10-CM

## 2022-10-19 LAB
BUN SERPL-MCNC: 10 MG/DL (ref 6–20)
BUN/CREAT SERPL: 17 (ref 9–23)
CALCIUM SERPL-MCNC: 9.9 MG/DL (ref 8.7–10.2)
CHLORIDE SERPL-SCNC: 107 MMOL/L (ref 96–106)
CO2 SERPL-SCNC: 18 MMOL/L (ref 20–29)
CREAT SERPL-MCNC: 0.59 MG/DL (ref 0.57–1)
EGFR: 120 ML/MIN/1.73
GLUCOSE SERPL-MCNC: 83 MG/DL (ref 70–99)
POTASSIUM SERPL-SCNC: 4.3 MMOL/L (ref 3.5–5.2)
SODIUM SERPL-SCNC: 142 MMOL/L (ref 134–144)

## 2022-10-19 PROCEDURE — G9717 DOC PT DX DEP/BP F/U NT REQ: HCPCS | Performed by: FAMILY MEDICINE

## 2022-10-19 PROCEDURE — G8417 CALC BMI ABV UP PARAM F/U: HCPCS | Performed by: FAMILY MEDICINE

## 2022-10-19 PROCEDURE — 99213 OFFICE O/P EST LOW 20 MIN: CPT | Performed by: FAMILY MEDICINE

## 2022-10-19 PROCEDURE — G8427 DOCREV CUR MEDS BY ELIG CLIN: HCPCS | Performed by: FAMILY MEDICINE

## 2022-10-19 PROCEDURE — G0463 HOSPITAL OUTPT CLINIC VISIT: HCPCS | Performed by: FAMILY MEDICINE

## 2022-10-19 RX ORDER — TIRZEPATIDE 2.5 MG/.5ML
2.5 INJECTION, SOLUTION SUBCUTANEOUS
Qty: 4 PEN | Refills: 0 | Status: SHIPPED | OUTPATIENT
Start: 2022-10-19 | End: 2022-11-10

## 2022-10-19 NOTE — PROGRESS NOTES
Ofelia Brandt is a 39 y.o. female    Chief Complaint   Patient presents with    Weight Gain     Patient here about weight gain. 1. Have you been to the ER, urgent care clinic since your last visit? Hospitalized since your last visit? No  2. Have you seen or consulted any other health care providers outside of the [address] [place] [address] since your last visit? Include any pap smears or colon screening.  No    Visit Vitals  /80 (BP 1 Location: Right arm, BP Patient Position: Sitting, BP Cuff Size: Large adult)   Pulse 80   Temp 97.5 °F (36.4 °C)   Resp 12   Ht 5' 3\" (1.6 m)   Wt 263 lb 3.2 oz (119.4 kg)   SpO2 99%   BMI 46.62 kg/m²     3 most recent PHQ Screens 10/13/2022   Little interest or pleasure in doing things Not at all   Feeling down, depressed, irritable, or hopeless Not at all   Total Score PHQ 2 0     Health Maintenance Due   Topic Date Due    Medicare Yearly Exam  Never done    COVID-19 Vaccine (3 - Booster for Pfizer series) 09/24/2021    Flu Vaccine (1) Never done

## 2022-10-25 PROBLEM — Z97.5 IUD (INTRAUTERINE DEVICE) IN PLACE: Status: ACTIVE | Noted: 2022-10-25

## 2022-10-25 PROBLEM — F20.9 SCHIZOPHRENIA (HCC): Chronic | Status: ACTIVE | Noted: 2021-01-08

## 2022-11-14 NOTE — PROGRESS NOTES
Franky Allen  39 y.o. female  1985  [address] [address]  [id number]   [address]: Progress Note  Мария Eubanks MD       Encounter Date: 10/19/2022    Chief Complaint   Patient presents with    Weight Gain     Patient here about weight gain. History of Present Illness   Franky Allen is a 39 y.o. female who presents to clinic today for up on weight loss. Here today with her daughter. Overall patient is dissatisfied with her weight loss. Since her last visit she has gained approximately 1 pound. That being said she is only 3 pounds up from July. She continues to go to the gym several days a week for couple hours at a time. She finds this is therapeutic. She is attempting to eat healthy as well and there is some concern that she may not be eating enough. She recently changed from [employer] App to the [place] [employer]. She notes since doing this her appetite has increased. Review of Systems   Review of Systems - Negative except as noted above. Vitals/Objective:     Vitals:    10/19/22 1613   BP: 122/80   Pulse: 80   Resp: 12   Temp: 97.5 °F (36.4 °C)   SpO2: 99%   Weight: 263 lb 3.2 oz (119.4 kg)   Height: 5' 3\" (1.6 m)     Body mass index is 46.62 kg/m². General: Patient alert and oriented and in NAD  Heart: Regular rate and rhythm, No murmurs, rubs or gallops. 2+ peripheral pulses  Lungs: Clear to auscultation bilaterally, no wheezing, rales or rhonchi  Psych: Paranoid, however appropriate mood and affect    Assessment and Plan:   1. Morbid obesity due to excess calories ([address]  We will try to get [facility] coverage for weight loss for the patient. Also advised to reach out to their health insurance to see if any weight loss medications are covered. Celebrated ability to essentially maintain weight over the past 5 months. While this may not seem like her when, given prior trajectory this is a big success.   Weight loss is somewhat challenged by mental health and certain medications used to treat this. Continue to set goals, pay attention to hunger cues, drink plenty of fluids, and find healthy snacks. - tirzepatide (Mounjaro) 2.5 mg/0.5 mL pnij; 2.5 mg by SubCUTAneous route every seven (7) days for 4 doses. Dispense: 4 Pen; Refill: 0  - tirzepatide 5 mg/0.5 mL pnij; 5 mg by SubCUTAneous route every seven (7) days. Dispense: 12 Pen; Refill: 0    2. Schizophrenia, unspecified type [facility])  Managed by psychiatry. Recent change in Abilify to a long-acting IM injection. I have discussed the diagnosis with the patient and the intended plan as seen in the above orders. she has expressed understanding. The patient has received an after-visit summary and questions were answered concerning future plans. I have discussed medication side effects and warnings with the patient as well. Follow-up and Dispositions    Return in about 6 weeks (around 11/30/2022). Electronically Signed: Leydi Bonilla MD     History   Patients past medical, surgical and family histories were reviewed and updated. Past Medical History:   Diagnosis Date    Calculus of kidney     Depression 01/05/2021    H/O headache 07/08/2013    Headache(784.0)     Kidney disease     Migraines     Pseudotumor cerebri syndrome 07/08/2013    Schizophrenia [facility])      Past Surgical History:   Procedure Laterality Date    EMG LIMITED  2/12/2016     Family History   Problem Relation Age of Onset    Diabetes Mother     Hypertension Mother     Diabetes Father     Hypertension Father     Cancer Neg Hx      Social History     Tobacco Use    Smoking status: Never    Smokeless tobacco: Never   Substance Use Topics    Alcohol use: No    Drug use:  No              Current Medications/Allergies     Current Outpatient Medications   Medication Sig Dispense Refill    aripiprazole (ABILIFY MAINTENA IM) by IntraMUSCular route.      tirzepatide 5 mg/0.5 mL pnij 5 mg by SubCUTAneous route every seven (7) days. 12 Pen 0    acetaZOLAMIDE (DIAMOX) 250 mg tablet Take 1 Tablet by mouth two (2) times a day. For Pseudotumor cerebri (aka Idiopathic Intracranial Hypertension) 180 Tablet 1    metFORMIN (GLUCOPHAGE) 500 mg tablet Take 1 Tablet by mouth two (2) times daily (with meals).  60 Tablet 1     No Known Allergies

## 2022-12-19 NOTE — TELEPHONE ENCOUNTER
HSAT demonstrated normal AHI 2/h associated with minimal SaO2 87%. Snoring during 5.1%. HSAT potentially underestimated severity of sleep disordered breathing. Recommendation: Attended PSG. Sleep technologist: Please advise patient of study results. Order has been entered for attended study. Patient's daughter had served as .
Reviewed sleep study results with patient via [employer] patient's preferred language is Kazakh. She expressed understanding and is willing to proceed with an in lab PSG .
3

## 2023-03-31 LAB
HBA1C MFR BLD HPLC: 5.4 %
HBA1C MFR BLD HPLC: 5.4 %

## 2023-05-17 RX ORDER — TIRZEPATIDE 5 MG/.5ML
5 INJECTION, SOLUTION SUBCUTANEOUS
COMMUNITY
Start: 2022-10-19

## 2023-05-17 RX ORDER — ACETAZOLAMIDE 250 MG/1
250 TABLET ORAL 2 TIMES DAILY
COMMUNITY
Start: 2023-04-28

## 2023-09-27 ENCOUNTER — OFFICE VISIT (OUTPATIENT)
Age: 38
End: 2023-09-27
Payer: MEDICARE

## 2023-09-27 VITALS
HEIGHT: 62 IN | HEART RATE: 68 BPM | BODY MASS INDEX: 47.84 KG/M2 | TEMPERATURE: 97.5 F | SYSTOLIC BLOOD PRESSURE: 121 MMHG | DIASTOLIC BLOOD PRESSURE: 62 MMHG | RESPIRATION RATE: 16 BRPM | OXYGEN SATURATION: 98 % | WEIGHT: 260 LBS

## 2023-09-27 DIAGNOSIS — G93.2 IIH (IDIOPATHIC INTRACRANIAL HYPERTENSION): Primary | ICD-10-CM

## 2023-09-27 PROCEDURE — 99214 OFFICE O/P EST MOD 30 MIN: CPT | Performed by: PSYCHIATRY & NEUROLOGY

## 2023-09-27 PROCEDURE — 4004F PT TOBACCO SCREEN RCVD TLK: CPT | Performed by: PSYCHIATRY & NEUROLOGY

## 2023-09-27 PROCEDURE — G8428 CUR MEDS NOT DOCUMENT: HCPCS | Performed by: PSYCHIATRY & NEUROLOGY

## 2023-09-27 PROCEDURE — G8419 CALC BMI OUT NRM PARAM NOF/U: HCPCS | Performed by: PSYCHIATRY & NEUROLOGY

## 2023-09-27 RX ORDER — TRAZODONE HYDROCHLORIDE 50 MG/1
50 TABLET ORAL NIGHTLY
COMMUNITY
Start: 2023-09-19

## 2023-09-27 RX ORDER — ACETAZOLAMIDE 250 MG/1
250 TABLET ORAL 2 TIMES DAILY
Qty: 180 TABLET | Refills: 3 | Status: SHIPPED | OUTPATIENT
Start: 2023-09-27

## 2023-09-27 RX ORDER — ARIPIPRAZOLE 300 MG
300 KIT INTRAMUSCULAR
COMMUNITY
Start: 2023-09-21

## 2023-09-27 RX ORDER — HYDROXYZINE HYDROCHLORIDE 25 MG/1
25 TABLET, FILM COATED ORAL PRN
COMMUNITY
Start: 2023-09-05

## 2023-09-27 NOTE — PROGRESS NOTES
[facility] and [address] at [facility] at [address] [place] [address], [id number] [place] [address]   [phone]              Chief Complaint   Patient presents with    Other     Doing well with the medication      Current Outpatient Medications   Medication Sig Dispense Refill    ABILIFY MAINTENA 300 MG PRSY prefilled syringe Inject 1 Syringe into the muscle every 30 days      hydrOXYzine HCl (ATARAX) 25 MG tablet Take 1 tablet by mouth as needed      traZODone (DESYREL) 50 MG tablet Take 1 tablet by mouth at bedtime Take 1 to 2 tablets by mouth at bedtime. acetaZOLAMIDE (DIAMOX) 250 MG tablet Take 1 tablet by mouth 2 times daily (Patient not taking: Reported on 9/27/2023)      metFORMIN (GLUCOPHAGE) 500 MG tablet Take 1 tablet by mouth 2 times daily (with meals) (Patient not taking: Reported on 9/27/2023)      Tirzepatide (MOUNJARO) 5 MG/0.5ML SOPN SC injection Inject 0.5 mLs into the skin every 7 days (Patient not taking: Reported on 9/27/2023)       No current facility-administered medications for this visit. Not on File  Social History     Tobacco Use    Smoking status: Never    Smokeless tobacco: Never   Substance Use Topics    Alcohol use: No    Drug use: No   51-year-old lady comes for follow-up today. She was previously followed by Dr. Monty Guillermo. Last visit was a year ago. She has pseudotumor cerebri. She was on Diamox 250 mg twice daily and at that time she reported perhaps 2 headache days per month. Imitrex did not work well for her migraines. Maxalt worked better. She was on Aimovig but her insurance stopped covering and Topamax did not help. He also noted that she had nonepileptic syncopal spells. She was continued on Diamox 250 mg twice daily. Labs were checked. She was to continue to follow with ophthalmology    Today she is with her daughter.   She

## 2023-09-27 NOTE — PROGRESS NOTES
Please see the attached refill request.   Problem: Suicide  Goal: *STG: Remains safe in hospital  1/9/2021 1851 by Lisandro Blair RN  Outcome: Progressing Towards Goal  1/9/2021 1846 by Lisandro Blair RN  Outcome: Progressing Towards Goal  Goal: *STG/LTG: Complies with medication therapy  Outcome: Progressing Towards Goal  Goal: Interventions  1/9/2021 1851 by Lisandro Blair RN  Outcome: Progressing Towards Goal  1/9/2021 1846 by Lisandro Blair RN  Outcome: Progressing Towards Goal     9790 This writer received report from the outgoing nurse. 0657 Patient presents with a cooperative attitude, sad affect, and depressed mood. Patient is alert and oriented x 4. Patient denied , SI, HI, and AVH. Patient complained of 4 out of 10 head pain. 2978 Patient stated that she felt better. 0/10 head pain. 1228 Patient has been medication and meal compliant. [id number] Patient has been med and meal compliant. 1651 Patient complained of indigestion. Patient complained 30 mL of maalox.

## 2024-03-04 ENCOUNTER — NURSE ONLY (OUTPATIENT)
Age: 39
End: 2024-03-04

## 2024-03-04 NOTE — PROGRESS NOTES
No lab orders from [facility]. Patient had Lab Rich orders from outside doctor & was given copy of Lab Rich collection sites.

## 2024-09-26 ENCOUNTER — OFFICE VISIT (OUTPATIENT)
Age: 39
End: 2024-09-26
Payer: MEDICARE

## 2024-09-26 VITALS
OXYGEN SATURATION: 99 % | RESPIRATION RATE: 14 BRPM | WEIGHT: 260 LBS | BODY MASS INDEX: 47.84 KG/M2 | DIASTOLIC BLOOD PRESSURE: 71 MMHG | SYSTOLIC BLOOD PRESSURE: 121 MMHG | HEART RATE: 76 BPM | HEIGHT: 62 IN

## 2024-09-26 DIAGNOSIS — G93.2 IIH (IDIOPATHIC INTRACRANIAL HYPERTENSION): Primary | ICD-10-CM

## 2024-09-26 PROCEDURE — 99214 OFFICE O/P EST MOD 30 MIN: CPT | Performed by: PSYCHIATRY & NEUROLOGY

## 2024-09-26 RX ORDER — PALIPERIDONE PALMITATE 234 MG/1.5ML
234 INJECTION INTRAMUSCULAR
COMMUNITY
Start: 2024-09-16

## 2024-09-26 RX ORDER — ACETAZOLAMIDE 250 MG/1
250 TABLET ORAL 2 TIMES DAILY
Qty: 180 TABLET | Refills: 3 | Status: CANCELLED | OUTPATIENT
Start: 2024-09-26

## 2024-09-26 RX ORDER — ACETAZOLAMIDE 500 MG/1
500 CAPSULE, EXTENDED RELEASE ORAL 2 TIMES DAILY
Qty: 180 CAPSULE | Refills: 3 | Status: SHIPPED | OUTPATIENT
Start: 2024-09-26

## 2025-01-22 ENCOUNTER — OFFICE VISIT (OUTPATIENT)
Age: 40
End: 2025-01-22

## 2025-01-22 VITALS
OXYGEN SATURATION: 98 % | RESPIRATION RATE: 16 BRPM | TEMPERATURE: 98.5 F | DIASTOLIC BLOOD PRESSURE: 88 MMHG | SYSTOLIC BLOOD PRESSURE: 125 MMHG | BODY MASS INDEX: 51.67 KG/M2 | WEIGHT: 280.8 LBS | HEIGHT: 62 IN | HEART RATE: 76 BPM

## 2025-01-22 DIAGNOSIS — Z13.0 SCREENING FOR DEFICIENCY ANEMIA: ICD-10-CM

## 2025-01-22 DIAGNOSIS — R63.5 WEIGHT GAIN: ICD-10-CM

## 2025-01-22 DIAGNOSIS — Z13.1 DIABETES MELLITUS SCREENING: ICD-10-CM

## 2025-01-22 DIAGNOSIS — Z00.00 MEDICARE ANNUAL WELLNESS VISIT, SUBSEQUENT: Primary | ICD-10-CM

## 2025-01-22 DIAGNOSIS — G93.2 PSEUDOTUMOR CEREBRI SYNDROME: ICD-10-CM

## 2025-01-22 DIAGNOSIS — F20.9 SCHIZOPHRENIA, UNSPECIFIED TYPE (HCC): ICD-10-CM

## 2025-01-22 DIAGNOSIS — E66.01 MORBID (SEVERE) OBESITY DUE TO EXCESS CALORIES: ICD-10-CM

## 2025-01-22 LAB
ALBUMIN SERPL-MCNC: 4 G/DL (ref 3.5–5)
ALBUMIN/GLOB SERPL: 1.1 (ref 1.1–2.2)
ALP SERPL-CCNC: 59 U/L (ref 45–117)
ALT SERPL-CCNC: 16 U/L (ref 12–78)
ANION GAP SERPL CALC-SCNC: 4 MMOL/L (ref 2–12)
AST SERPL-CCNC: 12 U/L (ref 15–37)
BASOPHILS # BLD: 0.04 K/UL (ref 0–0.1)
BASOPHILS NFR BLD: 0.8 % (ref 0–1)
BILIRUB SERPL-MCNC: 0.9 MG/DL (ref 0.2–1)
BUN SERPL-MCNC: 22 MG/DL (ref 6–20)
BUN/CREAT SERPL: 34 (ref 12–20)
CALCIUM SERPL-MCNC: 9.3 MG/DL (ref 8.5–10.1)
CHLORIDE SERPL-SCNC: 105 MMOL/L (ref 97–108)
CHOLEST SERPL-MCNC: 176 MG/DL
CO2 SERPL-SCNC: 27 MMOL/L (ref 21–32)
CREAT SERPL-MCNC: 0.65 MG/DL (ref 0.55–1.02)
DIFFERENTIAL METHOD BLD: ABNORMAL
EOSINOPHIL # BLD: 0.11 K/UL (ref 0–0.4)
EOSINOPHIL NFR BLD: 2.1 % (ref 0–7)
ERYTHROCYTE [DISTWIDTH] IN BLOOD BY AUTOMATED COUNT: 12.7 % (ref 11.5–14.5)
EST. AVERAGE GLUCOSE BLD GHB EST-MCNC: 108 MG/DL
GLOBULIN SER CALC-MCNC: 3.5 G/DL (ref 2–4)
GLUCOSE SERPL-MCNC: 97 MG/DL (ref 65–100)
HBA1C MFR BLD: 5.4 % (ref 4–5.6)
HCT VFR BLD AUTO: 41.3 % (ref 35–47)
HDLC SERPL-MCNC: 62 MG/DL
HDLC SERPL: 2.8 (ref 0–5)
HGB BLD-MCNC: 13.3 G/DL (ref 11.5–16)
IMM GRANULOCYTES # BLD AUTO: 0.04 K/UL (ref 0–0.04)
IMM GRANULOCYTES NFR BLD AUTO: 0.8 % (ref 0–0.5)
LDLC SERPL CALC-MCNC: 101.2 MG/DL (ref 0–100)
LYMPHOCYTES # BLD: 1.62 K/UL (ref 0.8–3.5)
LYMPHOCYTES NFR BLD: 30.9 % (ref 12–49)
MCH RBC QN AUTO: 27.5 PG (ref 26–34)
MCHC RBC AUTO-ENTMCNC: 32.2 G/DL (ref 30–36.5)
MCV RBC AUTO: 85.5 FL (ref 80–99)
MONOCYTES # BLD: 0.42 K/UL (ref 0–1)
MONOCYTES NFR BLD: 8 % (ref 5–13)
NEUTS SEG # BLD: 3.01 K/UL (ref 1.8–8)
NEUTS SEG NFR BLD: 57.4 % (ref 32–75)
NRBC # BLD: 0 K/UL (ref 0–0.01)
NRBC BLD-RTO: 0 PER 100 WBC
PLATELET # BLD AUTO: 351 K/UL (ref 150–400)
PMV BLD AUTO: 10.7 FL (ref 8.9–12.9)
POTASSIUM SERPL-SCNC: 4.4 MMOL/L (ref 3.5–5.1)
PROT SERPL-MCNC: 7.5 G/DL (ref 6.4–8.2)
RBC # BLD AUTO: 4.83 M/UL (ref 3.8–5.2)
SODIUM SERPL-SCNC: 136 MMOL/L (ref 136–145)
TRIGL SERPL-MCNC: 64 MG/DL
TSH SERPL DL<=0.05 MIU/L-ACNC: 3.3 UIU/ML (ref 0.36–3.74)
VLDLC SERPL CALC-MCNC: 12.8 MG/DL
WBC # BLD AUTO: 5.2 K/UL (ref 3.6–11)

## 2025-01-22 ASSESSMENT — PATIENT HEALTH QUESTIONNAIRE - PHQ9
10. IF YOU CHECKED OFF ANY PROBLEMS, HOW DIFFICULT HAVE THESE PROBLEMS MADE IT FOR YOU TO DO YOUR WORK, TAKE CARE OF THINGS AT HOME, OR GET ALONG WITH OTHER PEOPLE: NOT DIFFICULT AT ALL
SUM OF ALL RESPONSES TO PHQ9 QUESTIONS 1 & 2: 0
6. FEELING BAD ABOUT YOURSELF - OR THAT YOU ARE A FAILURE OR HAVE LET YOURSELF OR YOUR FAMILY DOWN: NOT AT ALL
3. TROUBLE FALLING OR STAYING ASLEEP: SEVERAL DAYS
SUM OF ALL RESPONSES TO PHQ QUESTIONS 1-9: 3
8. MOVING OR SPEAKING SO SLOWLY THAT OTHER PEOPLE COULD HAVE NOTICED. OR THE OPPOSITE, BEING SO FIGETY OR RESTLESS THAT YOU HAVE BEEN MOVING AROUND A LOT MORE THAN USUAL: NOT AT ALL
SUM OF ALL RESPONSES TO PHQ QUESTIONS 1-9: 3
4. FEELING TIRED OR HAVING LITTLE ENERGY: SEVERAL DAYS
SUM OF ALL RESPONSES TO PHQ QUESTIONS 1-9: 3
5. POOR APPETITE OR OVEREATING: SEVERAL DAYS
9. THOUGHTS THAT YOU WOULD BE BETTER OFF DEAD, OR OF HURTING YOURSELF: NOT AT ALL
1. LITTLE INTEREST OR PLEASURE IN DOING THINGS: NOT AT ALL
7. TROUBLE CONCENTRATING ON THINGS, SUCH AS READING THE NEWSPAPER OR WATCHING TELEVISION: NOT AT ALL
2. FEELING DOWN, DEPRESSED OR HOPELESS: NOT AT ALL
SUM OF ALL RESPONSES TO PHQ QUESTIONS 1-9: 3

## 2025-01-22 ASSESSMENT — LIFESTYLE VARIABLES
HOW MANY STANDARD DRINKS CONTAINING ALCOHOL DO YOU HAVE ON A TYPICAL DAY: PATIENT DOES NOT DRINK
HOW OFTEN DO YOU HAVE A DRINK CONTAINING ALCOHOL: NEVER

## 2025-01-22 NOTE — PROGRESS NOTES
Jacquie Armenta is a 39 y.o. female      Chief Complaint   Patient presents with    Medicare AWV     Headache x3-4 days   - BP check   - DM check  - weight gain (fast)       \"Have you been to the ER, urgent care clinic since your last visit?  Hospitalized since your last visit?\"    NO    “Have you seen or consulted any other health care providers outside of [facility] since your last visit?”    NO              Vitals:    01/22/25 0807   BP: 125/88   Site: Right Upper Arm   Position: Sitting   Cuff Size: Large Adult   Pulse: 76   Resp: 16   Temp: 98.5 °F (36.9 °C)   TempSrc: Oral   SpO2: 98%   Weight: 127.4 kg (280 lb 12.8 oz)   Height: 1.575 m (5' 2\")            Health Maintenance Due   Topic Date Due    Depression Monitoring  Never done    Varicella vaccine (1 of 2 - 13+ 2-dose series) Never done    Hepatitis B vaccine (1 of 3 - 19+ 3-dose series) Never done    Annual Wellness Visit (Medicare)  Never done    Flu vaccine (1) Never done    COVID-19 Vaccine (3 - 2023-24 season) 09/01/2024         Medication Reconciliation completed, changes noted.  Please  Update medication list.

## 2025-01-22 NOTE — PROGRESS NOTES
Medicare Annual Wellness Visit    Jacquie Armenta is here for Medicare AWV     Assessment & Plan   Medicare annual wellness visit, subsequent: pt declines flu shot. UTD on pap. Will obtain basic labs.   -     CBC with Auto Differential; Future  -     Comprehensive Metabolic Panel; Future  Weight gain: likely d/t Invega, however pt's mood is currently stable on this medication so switching is not an option. Will also obtain some labs to r/o other causes.   -     Comprehensive Metabolic Panel; Future  -     Lipid Panel; Future  -     Hemoglobin A1C; Future  -     TSH; Future  Schizophrenia, unspecified type (HCC): chronic. Stable. Sees psychiatry who manages her medications.   Pseudotumor cerebri syndrome: f/b neurology. Has f/u appointment later this month.   -     Comprehensive Metabolic Panel; Future  Screening for deficiency anemia  -     CBC with Auto Differential; Future  Diabetes mellitus screening  -     Hemoglobin A1C; Future  BMI 50.0-59.9, adult  -     Lipid Panel; Future  -     Hemoglobin A1C; Future  Morbid (severe) obesity due to excess calories  -     Lipid Panel; Future     Return in 1 year (on 1/22/2026) for Medicare AWV.     Subjective   Pt reports 20lb weight gain despite trying to eat healthy and exercising. Notes that her psychiatrist switched her from Abilify to Invega. She also reports intermittent HA x 3-4 days. Pt states she would like to be checked for diabetes.     Patient's complete Health Risk Assessment and screening values have been reviewed and are found in Flowsheets. The following problems were reviewed today and where indicated follow up appointments were made and/or referrals ordered.    Positive Risk Factor Screenings with Interventions:                Abnormal BMI (obese):  Body mass index is 51.36 kg/m². (!) Abnormal  Interventions:  join a structured exercise program- pt has a gym membership; encouraged to go to exercise classes for structured instruction          Vision

## 2025-01-24 NOTE — PROGRESS NOTES
[facility] Attending Attestation: While the patient was in clinic or immediately following the patient leaving the clinic, I reviewed the patient's medical history, the resident's findings on physical examination, and the patient's diagnosis and treatment plan with the resident and agree with the documentation in the note.     Alok Gonzalez MD

## 2025-01-27 ENCOUNTER — OFFICE VISIT (OUTPATIENT)
Age: 40
End: 2025-01-27
Payer: MEDICARE

## 2025-01-27 VITALS
DIASTOLIC BLOOD PRESSURE: 76 MMHG | HEART RATE: 82 BPM | RESPIRATION RATE: 20 BRPM | OXYGEN SATURATION: 97 % | SYSTOLIC BLOOD PRESSURE: 126 MMHG

## 2025-01-27 DIAGNOSIS — G93.2 IIH (IDIOPATHIC INTRACRANIAL HYPERTENSION): Primary | ICD-10-CM

## 2025-01-27 DIAGNOSIS — G43.009 MIGRAINE WITHOUT AURA AND WITHOUT STATUS MIGRAINOSUS, NOT INTRACTABLE: ICD-10-CM

## 2025-01-27 PROCEDURE — 99214 OFFICE O/P EST MOD 30 MIN: CPT

## 2025-01-27 RX ORDER — UBROGEPANT 50 MG/1
50 TABLET ORAL PRN
Qty: 16 TABLET | Refills: 5 | Status: ACTIVE | OUTPATIENT
Start: 2025-01-27

## 2025-01-27 RX ORDER — BUPROPION HYDROCHLORIDE 150 MG/1
150 TABLET ORAL EVERY MORNING
COMMUNITY
Start: 2025-01-08

## 2025-01-27 ASSESSMENT — ENCOUNTER SYMPTOMS
VOMITING: 1
NAUSEA: 1
PHOTOPHOBIA: 1

## 2025-01-27 NOTE — PROGRESS NOTES
IIH- in a week a couple of times a headache   The pressure in her eyes are better   Naproxen 220 mg she uses to help get rid of her headache almost 3 times a week   IT doesn't always take the head pain away       
 Days of Exercise per Week: 3 days     Minutes of Exercise per Session: 60 min       Review of Systems   Constitutional: Negative.    Eyes:  Positive for photophobia.   Gastrointestinal:  Positive for nausea and vomiting.   Neurological:  Positive for headaches.         Remainder of comprehensive review is negative.     Physical Exam :    /76 (Site: Right Upper Arm, Position: Sitting, Cuff Size: Large Adult)   Pulse 82   Resp 20   SpO2 97%     General: Well defined, nourished, and groomed individual in no acute distress.    Neck: Supple, nontender, no bruits, no pain with resistance to active range of motion.    Musculoskeletal: Extremities revealed no edema and had full range of motion of joints.    Psych: Good mood and bright affect    NEUROLOGICAL EXAMINATION:    Mental Status: Alert and oriented to person, place, and time    Cranial Nerves:    II, III, IV, VI: Visual acuity grossly intact. Visual fields are normal.    Pupils are equal, round, and reactive to light and accommodation.    Extra-ocular movements are full and fluid.    V-XII: Hearing is grossly intact. Facial features are symmetric, with normal sensation and strength. The palate rises symmetrically and the tongue protrudes midline.     Motor Examination: Normal tone, bulk, and strength, 5/5 muscle strength throughout.     Coordination: No resting or intention tremor    Gait and Station: Steady while walking. Normal arm swing. No pronator drift. No muscle wasting or fasiculations noted.     Orders Placed This Encounter    buPROPion (WELLBUTRIN XL) 150 MG extended release tablet     Sig: Take 1 tablet by mouth every morning    Ubrogepant (UBRELVY) 50 MG TABS     Sig: Take 50 mg by mouth as needed (Take 1 pill at migraine onset. May repeat dose x 1 after 2 hours if still needed.)     Dispense:  16 tablet     Refill:  5        1. IIH (idiopathic intracranial hypertension)    2. Migraine without aura and without status migrainosus, not

## 2025-04-29 ENCOUNTER — OFFICE VISIT (OUTPATIENT)
Age: 40
End: 2025-04-29
Payer: MEDICARE

## 2025-04-29 VITALS
DIASTOLIC BLOOD PRESSURE: 82 MMHG | OXYGEN SATURATION: 97 % | SYSTOLIC BLOOD PRESSURE: 118 MMHG | RESPIRATION RATE: 20 BRPM | HEART RATE: 97 BPM

## 2025-04-29 DIAGNOSIS — G93.2 IIH (IDIOPATHIC INTRACRANIAL HYPERTENSION): Primary | ICD-10-CM

## 2025-04-29 DIAGNOSIS — G43.009 MIGRAINE WITHOUT AURA AND WITHOUT STATUS MIGRAINOSUS, NOT INTRACTABLE: ICD-10-CM

## 2025-04-29 PROCEDURE — 99214 OFFICE O/P EST MOD 30 MIN: CPT

## 2025-04-29 RX ORDER — ACETAZOLAMIDE 500 MG/1
500 CAPSULE, EXTENDED RELEASE ORAL 2 TIMES DAILY
Qty: 180 CAPSULE | Refills: 3 | Status: SHIPPED | OUTPATIENT
Start: 2025-04-29

## 2025-04-29 RX ORDER — UBROGEPANT 50 MG/1
50 TABLET ORAL PRN
Qty: 16 TABLET | Refills: 5 | Status: ACTIVE | OUTPATIENT
Start: 2025-04-29

## 2025-04-29 ASSESSMENT — ENCOUNTER SYMPTOMS
VOMITING: 1
NAUSEA: 1
EYE PAIN: 1

## 2025-04-29 NOTE — PROGRESS NOTES
Has some kind of pain every day, it can be at night or during the day   It has helped a lot since she started taking it , if she doesn't take it her eyes hurt a lot     Ubrelvy she takes 3 to 4 times a week, it does help when she takes it

## 2025-04-29 NOTE — PROGRESS NOTES
Jacquie Armenta is a 39 y.o. female who presents with the following  Chief Complaint   Patient presents with    Follow-up     Migraines      Last office visit note  HPI  Patient is here today with her sister for migraine and intracranial hypertension follow-up.  Patient was last seen September 26, 2024 by Dr. Rodriguez and was a previous patient of Dr. Cannon.  Patient previously was using Diamox 250 mg twice a day for intracranial hypertension and headaches.  At her last office visit, the patient was seeing an increase in her headaches from about 2 a month to them persistently waking her.  She would take Aleve but they would continue to come back.  She said that these headaches were occurring on the top of her head and at her temples bilaterally.  They were sharp and stabbing in nature.  Dr. Rodriguez increased the patient's Diamox to 500 mg twice a day and was advising her to see ophthalmology.     Today the patient says that since increasing the Diamox to 500 mg twice a day she no longer has the pressure behind her eyes however she continues to have headache.  She says that she has about 3 headache days a week 2 of which each week will be severe migraines.  She has associated photophobia, phonophobia, nausea, vomiting with these severe headaches.  These headaches still occur on the top of her head and at her temples bilaterally.  She still describes the pain as sharp pain and she still wakes with them.  She says that these headaches can last several hours or all day.  Sometimes she takes naproxen and sometimes it helps and other times it does not.     Of note, the patient has seen ophthalmology at [facility] October 2, 2024 seen on care everywhere.  It is noted that things are stable with no optic nerve swelling and to return every 6 months for a checkup.     Tried/failed: Imitrex, Maxalt, Aimovig (samples-not approved by insurance but was helpful),Topamax.  The patient is currently taking bupropion for mood

## 2025-07-23 ENCOUNTER — HOSPITAL ENCOUNTER (OUTPATIENT)
Facility: HOSPITAL | Age: 40
Discharge: HOME | End: 2025-07-26
Payer: MEDICARE

## 2025-07-23 ENCOUNTER — OFFICE VISIT (OUTPATIENT)
Age: 40
End: 2025-07-23

## 2025-07-23 VITALS
HEART RATE: 63 BPM | TEMPERATURE: 98 F | DIASTOLIC BLOOD PRESSURE: 80 MMHG | BODY MASS INDEX: 49.57 KG/M2 | WEIGHT: 271 LBS | RESPIRATION RATE: 13 BRPM | OXYGEN SATURATION: 98 % | SYSTOLIC BLOOD PRESSURE: 119 MMHG

## 2025-07-23 DIAGNOSIS — M25.572 PAIN IN LEFT ANKLE AND JOINTS OF LEFT FOOT: Primary | ICD-10-CM

## 2025-07-23 DIAGNOSIS — R03.0 ELEVATED BLOOD PRESSURE READING: ICD-10-CM

## 2025-07-23 DIAGNOSIS — M25.572 PAIN IN LEFT ANKLE AND JOINTS OF LEFT FOOT: ICD-10-CM

## 2025-07-23 PROCEDURE — 73630 X-RAY EXAM OF FOOT: CPT

## 2025-07-23 PROCEDURE — 73610 X-RAY EXAM OF ANKLE: CPT

## 2025-07-23 RX ORDER — NAPROXEN 500 MG/1
500 TABLET ORAL 2 TIMES DAILY WITH MEALS
Qty: 30 TABLET | Refills: 0 | Status: SHIPPED | OUTPATIENT
Start: 2025-07-23 | End: 2025-08-07

## 2025-07-23 ASSESSMENT — ENCOUNTER SYMPTOMS
VOMITING: 0
COLOR CHANGE: 0
BACK PAIN: 0
NAUSEA: 0

## 2025-07-23 NOTE — PROGRESS NOTES
summary and questions were answered concerning future plans.  I have discussed medication side effects and warnings with the patient as well. The patient agrees and understands above plan.                (Please note that parts of this dictation were completed with voice recognition software. Quite often unanticipated grammatical, syntax, homophones, and other interpretive errors are inadvertently transcribed by the computer software. Efforts were made to edit the dictation but occasionally words remain mis-transcribed.)     An electronic signature was used to authenticate this note.  -- Jeanette Hernández PA-C